# Patient Record
Sex: FEMALE | Race: WHITE | NOT HISPANIC OR LATINO | Employment: FULL TIME | URBAN - METROPOLITAN AREA
[De-identification: names, ages, dates, MRNs, and addresses within clinical notes are randomized per-mention and may not be internally consistent; named-entity substitution may affect disease eponyms.]

---

## 2017-02-03 ENCOUNTER — GENERIC CONVERSION - ENCOUNTER (OUTPATIENT)
Dept: OTHER | Facility: OTHER | Age: 22
End: 2017-02-03

## 2017-07-07 ENCOUNTER — GENERIC CONVERSION - ENCOUNTER (OUTPATIENT)
Dept: OTHER | Facility: OTHER | Age: 22
End: 2017-07-07

## 2018-04-10 ENCOUNTER — TRANSITIONAL CARE MANAGEMENT (OUTPATIENT)
Dept: FAMILY MEDICINE CLINIC | Facility: CLINIC | Age: 23
End: 2018-04-10

## 2018-04-10 ENCOUNTER — TELEPHONE (OUTPATIENT)
Dept: FAMILY MEDICINE CLINIC | Facility: CLINIC | Age: 23
End: 2018-04-10

## 2018-04-10 RX ORDER — PROCHLORPERAZINE MALEATE 10 MG
10 TABLET ORAL EVERY 6 HOURS PRN
COMMUNITY
End: 2019-05-03 | Stop reason: ALTCHOICE

## 2018-04-10 RX ORDER — MIRTAZAPINE 15 MG/1
15 TABLET, FILM COATED ORAL
COMMUNITY
End: 2018-04-23 | Stop reason: SDUPTHER

## 2018-04-10 NOTE — TELEPHONE ENCOUNTER
Called patient to schedule TCM  Patient called and left a message for me to call her back on Monday  Per Patient she was admitted at [facility]   Please direct the call to me when she calls back

## 2018-04-13 ENCOUNTER — OFFICE VISIT (OUTPATIENT)
Dept: FAMILY MEDICINE CLINIC | Facility: CLINIC | Age: 23
End: 2018-04-13
Payer: COMMERCIAL

## 2018-04-13 VITALS
TEMPERATURE: 98.1 F | SYSTOLIC BLOOD PRESSURE: 98 MMHG | BODY MASS INDEX: 20.38 KG/M2 | RESPIRATION RATE: 16 BRPM | DIASTOLIC BLOOD PRESSURE: 68 MMHG | HEART RATE: 64 BPM | WEIGHT: 119.38 LBS | HEIGHT: 64 IN

## 2018-04-13 DIAGNOSIS — Z30.011 ENCOUNTER FOR INITIAL PRESCRIPTION OF CONTRACEPTIVE PILLS: ICD-10-CM

## 2018-04-13 DIAGNOSIS — G43.A0 CYCLICAL VOMITING WITH NAUSEA, INTRACTABILITY OF VOMITING NOT SPECIFIED: Primary | ICD-10-CM

## 2018-04-13 DIAGNOSIS — G43.A0 CYCLICAL VOMITING WITH NAUSEA, INTRACTABILITY OF VOMITING NOT SPECIFIED: ICD-10-CM

## 2018-04-13 DIAGNOSIS — K82.4 GALLBLADDER POLYP: ICD-10-CM

## 2018-04-13 PROCEDURE — 99495 TRANSJ CARE MGMT MOD F2F 14D: CPT | Performed by: FAMILY MEDICINE

## 2018-04-13 RX ORDER — ACETAMINOPHEN AND CODEINE PHOSPHATE 120; 12 MG/5ML; MG/5ML
1 SOLUTION ORAL DAILY
Qty: 28 TABLET | Refills: 0 | Status: SHIPPED | OUTPATIENT
Start: 2018-04-13 | End: 2018-05-04 | Stop reason: SDUPTHER

## 2018-04-13 RX ORDER — ACETAMINOPHEN AND CODEINE PHOSPHATE 120; 12 MG/5ML; MG/5ML
1 SOLUTION ORAL DAILY
Qty: 28 TABLET | Refills: 5 | Status: SHIPPED | OUTPATIENT
Start: 2018-04-13 | End: 2018-04-13 | Stop reason: SDUPTHER

## 2018-04-13 NOTE — LETTER
April 13, 2018     Patient: Juliette Foley   YOB: 1995   Date of Visit: 4/13/2018       To Whom it May Concern:    Juliette Foley is under my professional care  She was seen in my office on 4/13/2018  She may return to work on 4/15/18  If you have any questions or concerns, please don't hesitate to call           Sincerely,          Isatu De Anda MD        CC: No Recipients

## 2018-04-13 NOTE — PROGRESS NOTES
Assessment/Plan:    No problem-specific Assessment & Plan notes found for this encounter  Diagnoses and all orders for this visit:    Cyclical vomiting with nausea, intractability of vomiting not specified  -     Ambulatory referral to Gastroenterology; Future    Gallbladder polyp  -     Ambulatory referral to Gastroenterology; Future          Subjective:      Patient ID: Keri Pichardo is a 23 y o  female  Vomiting    This is a chronic problem  The current episode started more than 1 year ago  The problem occurs 2 to 4 times per day (depends on what she eats)  The problem has been waxing and waning  The emesis has an appearance of stomach contents  There has been no fever  The fever has been present for less than 1 day  Associated symptoms include weight loss  Pertinent negatives include no abdominal pain, chest pain, coughing or headaches  Treatments tried: domperidone and compazine-domperidone caused tachycardia  The treatment provided mild relief  The following portions of the patient's history were reviewed and updated as appropriate: allergies, current medications, past family history, past medical history, past social history, past surgical history and problem list     Review of Systems   Constitutional: Positive for weight loss  Negative for unexpected weight change  HENT: Negative for sore throat  Respiratory: Negative for cough  Cardiovascular: Negative for chest pain  Gastrointestinal: Positive for nausea and vomiting  Negative for abdominal pain  Neurological: Negative for headaches  Objective:      BP 98/68 (BP Location: Right arm, Patient Position: Sitting, Cuff Size: Standard)   Pulse 64   Temp 98 1 °F (36 7 °C) (Temporal)   Resp 16   Ht 5' 4 25" (1 632 m)   Wt 54 1 kg (119 lb 6 oz)   LMP 03/22/2018 (Approximate)   BMI 20 33 kg/m²          Physical Exam   Constitutional: She appears well-developed and well-nourished  Neck: No thyromegaly present  Cardiovascular: Normal rate, regular rhythm and normal heart sounds  Pulmonary/Chest: Effort normal and breath sounds normal    Abdominal: Soft  Bowel sounds are normal  She exhibits no distension  Lymphadenopathy:     She has no cervical adenopathy

## 2018-04-13 NOTE — PATIENT INSTRUCTIONS
Look into [facility] paperwork for job, get opinion from new GI doc,micronor does not interact with cyclic vomiting syndrome-sx worse with menses-instructed to take as extended cycle pill

## 2018-04-23 ENCOUNTER — TELEPHONE (OUTPATIENT)
Dept: FAMILY MEDICINE CLINIC | Facility: CLINIC | Age: 23
End: 2018-04-23

## 2018-04-23 DIAGNOSIS — F41.9 ANXIETY: Primary | ICD-10-CM

## 2018-04-23 RX ORDER — MIRTAZAPINE 15 MG/1
15 TABLET, FILM COATED ORAL
Qty: 30 TABLET | Refills: 2 | Status: SHIPPED | OUTPATIENT
Start: 2018-04-23 | End: 2018-07-17 | Stop reason: SDUPTHER

## 2018-04-23 RX ORDER — ALPRAZOLAM 0.25 MG/1
0.25 TABLET ORAL 2 TIMES DAILY PRN
Qty: 30 TABLET | Refills: 1 | Status: SHIPPED | OUTPATIENT
Start: 2018-04-23 | End: 2018-05-30 | Stop reason: SDUPTHER

## 2018-04-23 NOTE — TELEPHONE ENCOUNTER
Patient called in and asked if you can give her a call back to discuss mirtazapine  15 mg tablet rx please advise

## 2018-05-04 DIAGNOSIS — G43.A0 CYCLICAL VOMITING WITH NAUSEA, INTRACTABILITY OF VOMITING NOT SPECIFIED: ICD-10-CM

## 2018-05-07 RX ORDER — ACETAMINOPHEN AND CODEINE PHOSPHATE 120; 12 MG/5ML; MG/5ML
1 SOLUTION ORAL DAILY
Qty: 28 TABLET | Refills: 4 | Status: SHIPPED | OUTPATIENT
Start: 2018-05-07 | End: 2018-09-26 | Stop reason: SDUPTHER

## 2018-05-30 DIAGNOSIS — F41.9 ANXIETY: ICD-10-CM

## 2018-05-31 RX ORDER — ALPRAZOLAM 0.25 MG/1
TABLET ORAL
Qty: 60 TABLET | Refills: 1 | Status: SHIPPED | OUTPATIENT
Start: 2018-05-31 | End: 2018-07-27 | Stop reason: SDUPTHER

## 2018-06-26 RX ORDER — OMEPRAZOLE 20 MG/1
20 CAPSULE, DELAYED RELEASE ORAL DAILY
Refills: 0 | COMMUNITY
Start: 2018-05-23 | End: 2019-05-03 | Stop reason: ALTCHOICE

## 2018-06-26 RX ORDER — OXYCODONE HYDROCHLORIDE AND ACETAMINOPHEN 5; 325 MG/1; MG/1
1 TABLET ORAL EVERY 6 HOURS PRN
Refills: 0 | COMMUNITY
Start: 2018-06-21 | End: 2019-05-03 | Stop reason: ALTCHOICE

## 2018-06-26 RX ORDER — SUCRALFATE 1 G/1
TABLET ORAL
Refills: 2 | COMMUNITY
Start: 2018-05-01 | End: 2019-05-03 | Stop reason: ALTCHOICE

## 2018-07-03 ENCOUNTER — OFFICE VISIT (OUTPATIENT)
Dept: FAMILY MEDICINE CLINIC | Facility: CLINIC | Age: 23
End: 2018-07-03
Payer: COMMERCIAL

## 2018-07-03 VITALS
DIASTOLIC BLOOD PRESSURE: 70 MMHG | TEMPERATURE: 98.8 F | RESPIRATION RATE: 16 BRPM | SYSTOLIC BLOOD PRESSURE: 104 MMHG | HEART RATE: 100 BPM | WEIGHT: 113.5 LBS | HEIGHT: 64 IN | BODY MASS INDEX: 19.38 KG/M2

## 2018-07-03 DIAGNOSIS — G43.A0 CYCLICAL VOMITING WITH NAUSEA, INTRACTABILITY OF VOMITING NOT SPECIFIED: ICD-10-CM

## 2018-07-03 DIAGNOSIS — J18.9 WALKING PNEUMONIA: Primary | ICD-10-CM

## 2018-07-03 PROCEDURE — 99214 OFFICE O/P EST MOD 30 MIN: CPT | Performed by: NURSE PRACTITIONER

## 2018-07-03 PROCEDURE — 3008F BODY MASS INDEX DOCD: CPT | Performed by: NURSE PRACTITIONER

## 2018-07-03 PROCEDURE — 1036F TOBACCO NON-USER: CPT | Performed by: NURSE PRACTITIONER

## 2018-07-03 RX ORDER — LEVOFLOXACIN 500 MG/1
500 TABLET, FILM COATED ORAL EVERY 24 HOURS
Qty: 7 TABLET | Refills: 0 | Status: SHIPPED | OUTPATIENT
Start: 2018-07-03 | End: 2018-07-10

## 2018-07-03 NOTE — PROGRESS NOTES
Chief Complaint   Patient presents with    Follow-up     FMLA paper work for work clearance  Pt also states having a cough with phlegm,       Assessment/Plan:       Diagnoses and all orders for this visit:    Walking pneumonia  -     levofloxacin (LEVAQUIN) 500 mg tablet; Take 1 tablet (500 mg total) by mouth every 24 hours for 7 days    Cyclical vomiting with nausea, intractability of vomiting not specified  Comments:  better since surgery      patient FMLA paperwork completed and return to work completed  Following her gallbladder surgery  Patient has follow up on Thursday with surgeon  Wants to go back to work  Subjective:      Patient ID: Davidson Acuna is a 20 y o  female  Cough   This is a new problem  The current episode started 1 to 4 weeks ago  The problem has been unchanged  The problem occurs constantly  The cough is productive of sputum  Associated symptoms include chills, a fever (one day), headaches, postnasal drip and wheezing  Pertinent negatives include no ear pain or sore throat  The following portions of the patient's history were reviewed and updated as appropriate: allergies, current medications, past family history, past medical history, past social history, past surgical history and problem list     Review of Systems   Constitutional: Positive for appetite change, chills and fever (one day)  HENT: Positive for postnasal drip  Negative for congestion, ear pain and sore throat  Respiratory: Positive for cough and wheezing  Gastrointestinal: Positive for nausea and vomiting (in the morning )  Negative for abdominal pain  Neurological: Positive for headaches           Objective:      /70 (BP Location: Right arm, Patient Position: Sitting, Cuff Size: Adult)   Pulse 100   Temp 98 8 °F (37 1 °C) (Temporal)   Resp 16   Ht 5' 4 06" (1 627 m)   Wt 51 5 kg (113 lb 8 oz)   LMP 06/04/2018 (Approximate)   BMI 19 44 kg/m²          Physical Exam   Constitutional: She appears well-developed and well-nourished  She has a sickly appearance  HENT:   Head: Normocephalic and atraumatic  Right Ear: Tympanic membrane normal    Left Ear: Tympanic membrane normal    Nose: Nose normal    Mouth/Throat: No posterior oropharyngeal erythema  Cardiovascular: S1 normal, S2 normal and normal heart sounds  Tachycardia present  No murmur heard  Pulmonary/Chest: Effort normal  She has no wheezes  She has rhonchi in the right upper field, the right middle field, the left upper field and the left middle field  Lymphadenopathy:     She has no cervical adenopathy

## 2018-07-11 ENCOUNTER — TELEPHONE (OUTPATIENT)
Dept: FAMILY MEDICINE CLINIC | Facility: CLINIC | Age: 23
End: 2018-07-11

## 2018-07-12 ENCOUNTER — TELEPHONE (OUTPATIENT)
Dept: FAMILY MEDICINE CLINIC | Facility: CLINIC | Age: 23
End: 2018-07-12

## 2018-07-17 DIAGNOSIS — F41.9 ANXIETY: ICD-10-CM

## 2018-07-17 RX ORDER — MIRTAZAPINE 15 MG/1
TABLET, FILM COATED ORAL
Qty: 30 TABLET | Refills: 2 | Status: SHIPPED | OUTPATIENT
Start: 2018-07-17 | End: 2018-11-12 | Stop reason: SDUPTHER

## 2018-07-27 DIAGNOSIS — F41.9 ANXIETY: ICD-10-CM

## 2018-07-27 RX ORDER — ALPRAZOLAM 0.25 MG/1
TABLET ORAL
Qty: 60 TABLET | Refills: 1 | Status: SHIPPED | OUTPATIENT
Start: 2018-07-27 | End: 2018-10-09 | Stop reason: SDUPTHER

## 2018-07-30 ENCOUNTER — TELEPHONE (OUTPATIENT)
Dept: FAMILY MEDICINE CLINIC | Facility: CLINIC | Age: 23
End: 2018-07-30

## 2018-07-30 NOTE — TELEPHONE ENCOUNTER
Pt called in and asked if you can up her dose to 1mg on   ALPRAZolam  0 25 mg tablet since her current dose is not working, if so pt will need a new rx [facility]  please advise

## 2018-09-26 DIAGNOSIS — G43.A0 CYCLICAL VOMITING WITH NAUSEA, INTRACTABILITY OF VOMITING NOT SPECIFIED: ICD-10-CM

## 2018-09-26 RX ORDER — ACETAMINOPHEN AND CODEINE PHOSPHATE 120; 12 MG/5ML; MG/5ML
1 SOLUTION ORAL DAILY
Qty: 28 TABLET | Refills: 4 | Status: SHIPPED | OUTPATIENT
Start: 2018-09-26 | End: 2019-01-21 | Stop reason: SDUPTHER

## 2018-10-09 DIAGNOSIS — F41.9 ANXIETY: ICD-10-CM

## 2018-10-10 RX ORDER — ALPRAZOLAM 0.25 MG/1
TABLET ORAL
Qty: 60 TABLET | Refills: 2 | Status: SHIPPED | OUTPATIENT
Start: 2018-10-10 | End: 2019-03-07 | Stop reason: SDUPTHER

## 2018-11-06 DIAGNOSIS — F41.9 ANXIETY: ICD-10-CM

## 2018-11-06 RX ORDER — MIRTAZAPINE 15 MG/1
TABLET, FILM COATED ORAL
Qty: 30 TABLET | Refills: 2 | OUTPATIENT
Start: 2018-11-06

## 2018-11-12 DIAGNOSIS — F41.9 ANXIETY: ICD-10-CM

## 2018-11-12 RX ORDER — MIRTAZAPINE 15 MG/1
15 TABLET, FILM COATED ORAL
Qty: 30 TABLET | Refills: 0 | Status: SHIPPED | OUTPATIENT
Start: 2018-11-12 | End: 2018-12-08 | Stop reason: SDUPTHER

## 2018-11-12 NOTE — TELEPHONE ENCOUNTER
Patient called in for refill on  mirtazapine (REMERON) 15 mg tablet  Sent to [facility] on file   Patient has an upcoming appointment on 11/20/18 she is completely out of her medication for yesterday and today and will like to know If you can send over a week supply to hold her over please advise

## 2018-12-08 DIAGNOSIS — F41.9 ANXIETY: ICD-10-CM

## 2018-12-09 RX ORDER — MIRTAZAPINE 15 MG/1
15 TABLET, FILM COATED ORAL
Qty: 30 TABLET | Refills: 0 | Status: SHIPPED | OUTPATIENT
Start: 2018-12-09 | End: 2019-01-06 | Stop reason: SDUPTHER

## 2019-01-06 DIAGNOSIS — F41.9 ANXIETY: ICD-10-CM

## 2019-01-06 RX ORDER — MIRTAZAPINE 15 MG/1
15 TABLET, FILM COATED ORAL
Qty: 30 TABLET | Refills: 0 | Status: SHIPPED | OUTPATIENT
Start: 2019-01-06 | End: 2019-02-17 | Stop reason: SDUPTHER

## 2019-01-21 DIAGNOSIS — G43.A0 CYCLICAL VOMITING WITH NAUSEA, INTRACTABILITY OF VOMITING NOT SPECIFIED: ICD-10-CM

## 2019-01-21 RX ORDER — ACETAMINOPHEN AND CODEINE PHOSPHATE 120; 12 MG/5ML; MG/5ML
1 SOLUTION ORAL DAILY
Qty: 28 TABLET | Refills: 4 | Status: SHIPPED | OUTPATIENT
Start: 2019-01-21 | End: 2019-04-18 | Stop reason: SDUPTHER

## 2019-02-17 DIAGNOSIS — F41.9 ANXIETY: ICD-10-CM

## 2019-02-17 RX ORDER — MIRTAZAPINE 15 MG/1
15 TABLET, FILM COATED ORAL
Qty: 30 TABLET | Refills: 0 | Status: SHIPPED | OUTPATIENT
Start: 2019-02-17 | End: 2019-03-21 | Stop reason: SDUPTHER

## 2019-03-07 DIAGNOSIS — F41.9 ANXIETY: ICD-10-CM

## 2019-03-08 RX ORDER — ALPRAZOLAM 0.25 MG/1
TABLET ORAL
Qty: 60 TABLET | Refills: 2 | Status: SHIPPED | OUTPATIENT
Start: 2019-03-08 | End: 2019-07-02 | Stop reason: SDUPTHER

## 2019-03-21 DIAGNOSIS — F41.9 ANXIETY: ICD-10-CM

## 2019-03-21 RX ORDER — MIRTAZAPINE 15 MG/1
15 TABLET, FILM COATED ORAL
Qty: 30 TABLET | Refills: 0 | Status: SHIPPED | OUTPATIENT
Start: 2019-03-21 | End: 2019-04-25 | Stop reason: SDUPTHER

## 2019-04-18 DIAGNOSIS — G43.A0 CYCLICAL VOMITING WITH NAUSEA, INTRACTABILITY OF VOMITING NOT SPECIFIED: ICD-10-CM

## 2019-04-18 RX ORDER — ACETAMINOPHEN AND CODEINE PHOSPHATE 120; 12 MG/5ML; MG/5ML
1 SOLUTION ORAL DAILY
Qty: 28 TABLET | Refills: 4 | Status: SHIPPED | OUTPATIENT
Start: 2019-04-18 | End: 2019-07-01 | Stop reason: SDUPTHER

## 2019-04-21 DIAGNOSIS — F41.9 ANXIETY: ICD-10-CM

## 2019-04-21 RX ORDER — MIRTAZAPINE 15 MG/1
15 TABLET, FILM COATED ORAL
Qty: 30 TABLET | Refills: 0 | OUTPATIENT
Start: 2019-04-21

## 2019-04-25 ENCOUNTER — TELEPHONE (OUTPATIENT)
Dept: FAMILY MEDICINE CLINIC | Facility: CLINIC | Age: 24
End: 2019-04-25

## 2019-04-25 DIAGNOSIS — F41.9 ANXIETY: ICD-10-CM

## 2019-04-26 RX ORDER — MIRTAZAPINE 15 MG/1
15 TABLET, FILM COATED ORAL
Qty: 30 TABLET | Refills: 0 | Status: SHIPPED | OUTPATIENT
Start: 2019-04-26 | End: 2019-05-03 | Stop reason: SDUPTHER

## 2019-05-03 ENCOUNTER — OFFICE VISIT (OUTPATIENT)
Dept: FAMILY MEDICINE CLINIC | Facility: CLINIC | Age: 24
End: 2019-05-03
Payer: COMMERCIAL

## 2019-05-03 VITALS
TEMPERATURE: 97.6 F | DIASTOLIC BLOOD PRESSURE: 68 MMHG | HEIGHT: 64 IN | BODY MASS INDEX: 21.24 KG/M2 | HEART RATE: 98 BPM | RESPIRATION RATE: 18 BRPM | SYSTOLIC BLOOD PRESSURE: 104 MMHG | WEIGHT: 124.4 LBS

## 2019-05-03 DIAGNOSIS — G43.A0 CYCLICAL VOMITING WITH NAUSEA, INTRACTABILITY OF VOMITING NOT SPECIFIED: Primary | ICD-10-CM

## 2019-05-03 DIAGNOSIS — F41.9 ANXIETY: ICD-10-CM

## 2019-05-03 PROCEDURE — 1036F TOBACCO NON-USER: CPT | Performed by: FAMILY MEDICINE

## 2019-05-03 PROCEDURE — 99213 OFFICE O/P EST LOW 20 MIN: CPT | Performed by: FAMILY MEDICINE

## 2019-05-03 PROCEDURE — 3008F BODY MASS INDEX DOCD: CPT | Performed by: FAMILY MEDICINE

## 2019-05-03 RX ORDER — MIRTAZAPINE 15 MG/1
TABLET, FILM COATED ORAL
Qty: 45 TABLET | Refills: 3 | Status: SHIPPED | OUTPATIENT
Start: 2019-05-03 | End: 2019-07-31 | Stop reason: SDUPTHER

## 2019-05-23 DIAGNOSIS — F41.9 ANXIETY: ICD-10-CM

## 2019-05-23 RX ORDER — MIRTAZAPINE 15 MG/1
15 TABLET, FILM COATED ORAL
Qty: 30 TABLET | Refills: 0 | Status: SHIPPED | OUTPATIENT
Start: 2019-05-23 | End: 2019-05-24 | Stop reason: SDUPTHER

## 2019-05-24 ENCOUNTER — TELEPHONE (OUTPATIENT)
Dept: FAMILY MEDICINE CLINIC | Facility: CLINIC | Age: 24
End: 2019-05-24

## 2019-05-24 DIAGNOSIS — F41.9 ANXIETY: ICD-10-CM

## 2019-05-24 RX ORDER — MIRTAZAPINE 15 MG/1
15 TABLET, FILM COATED ORAL
Qty: 90 TABLET | Refills: 0 | Status: SHIPPED | OUTPATIENT
Start: 2019-05-24 | End: 2019-07-31 | Stop reason: SDUPTHER

## 2019-07-01 DIAGNOSIS — G43.A0 CYCLICAL VOMITING WITH NAUSEA, INTRACTABILITY OF VOMITING NOT SPECIFIED: ICD-10-CM

## 2019-07-02 DIAGNOSIS — F41.9 ANXIETY: ICD-10-CM

## 2019-07-02 RX ORDER — ACETAMINOPHEN AND CODEINE PHOSPHATE 120; 12 MG/5ML; MG/5ML
SOLUTION ORAL
Qty: 84 TABLET | Refills: 1 | Status: SHIPPED | OUTPATIENT
Start: 2019-07-02 | End: 2020-02-05 | Stop reason: SDUPTHER

## 2019-07-02 RX ORDER — ALPRAZOLAM 0.25 MG/1
TABLET ORAL
Qty: 180 TABLET | Refills: 0 | Status: SHIPPED | OUTPATIENT
Start: 2019-07-02 | End: 2020-04-16 | Stop reason: SDUPTHER

## 2019-07-31 DIAGNOSIS — F41.9 ANXIETY: ICD-10-CM

## 2019-07-31 RX ORDER — MIRTAZAPINE 15 MG/1
15 TABLET, FILM COATED ORAL
Qty: 90 TABLET | Refills: 1 | Status: SHIPPED | OUTPATIENT
Start: 2019-07-31 | End: 2020-02-06

## 2019-07-31 RX ORDER — MIRTAZAPINE 15 MG/1
TABLET, FILM COATED ORAL
Qty: 45 TABLET | Refills: 3 | Status: SHIPPED | OUTPATIENT
Start: 2019-07-31 | End: 2019-10-14 | Stop reason: SDUPTHER

## 2019-10-14 DIAGNOSIS — F41.9 ANXIETY: ICD-10-CM

## 2019-10-16 RX ORDER — MIRTAZAPINE 15 MG/1
TABLET, FILM COATED ORAL
Qty: 45 TABLET | Refills: 3 | Status: SHIPPED | OUTPATIENT
Start: 2019-10-16 | End: 2020-04-16 | Stop reason: SDUPTHER

## 2020-02-05 ENCOUNTER — TELEPHONE (OUTPATIENT)
Dept: FAMILY MEDICINE CLINIC | Facility: CLINIC | Age: 25
End: 2020-02-05

## 2020-02-05 DIAGNOSIS — R11.15 CYCLICAL VOMITING WITH NAUSEA: ICD-10-CM

## 2020-02-05 RX ORDER — ACETAMINOPHEN AND CODEINE PHOSPHATE 120; 12 MG/5ML; MG/5ML
1 SOLUTION ORAL DAILY
Qty: 84 TABLET | Refills: 0 | Status: SHIPPED | OUTPATIENT
Start: 2020-02-05 | End: 2020-04-16 | Stop reason: SDUPTHER

## 2020-02-05 NOTE — TELEPHONE ENCOUNTER
[facility] TO REFILL PT'S BIRTH CONTROL  SHE HAS NOT TAKEN IT FOR 2 DAYS NOW  [facility] [place]  PLEASE CHANGE THIS PHARMACY IN HER CHART BECAUSE SHE WANTS EVERYTHING TO GO THERE NOW  PLEASE LET HER KNOW ASAP AS SHE HAS NOT TAKEN HER BIRTH CONTROL FOR 2 DAYS  THANK YOU  PT WOULD LIKE A CALL BACK ASAP REGARDING THIS PLEASE  THANK YOU

## 2020-02-06 PROBLEM — K82.4 GALLBLADDER POLYP: Status: RESOLVED | Noted: 2018-04-13 | Resolved: 2020-02-06

## 2020-02-06 PROBLEM — Z78.9 USES BIRTH CONTROL: Status: ACTIVE | Noted: 2020-02-06

## 2020-02-06 PROBLEM — F41.9 ANXIETY: Status: ACTIVE | Noted: 2020-02-06

## 2020-04-16 ENCOUNTER — TELEMEDICINE (OUTPATIENT)
Dept: FAMILY MEDICINE CLINIC | Facility: CLINIC | Age: 25
End: 2020-04-16
Payer: COMMERCIAL

## 2020-04-16 DIAGNOSIS — F41.9 ANXIETY: ICD-10-CM

## 2020-04-16 DIAGNOSIS — R11.15 CYCLICAL VOMITING WITH NAUSEA: ICD-10-CM

## 2020-04-16 PROCEDURE — 99213 OFFICE O/P EST LOW 20 MIN: CPT | Performed by: FAMILY MEDICINE

## 2020-04-16 RX ORDER — ACETAMINOPHEN AND CODEINE PHOSPHATE 120; 12 MG/5ML; MG/5ML
1 SOLUTION ORAL DAILY
Qty: 84 TABLET | Refills: 1 | Status: SHIPPED | OUTPATIENT
Start: 2020-04-16 | End: 2020-09-15

## 2020-04-16 RX ORDER — MIRTAZAPINE 15 MG/1
TABLET, FILM COATED ORAL
Qty: 135 TABLET | Refills: 1 | Status: SHIPPED | OUTPATIENT
Start: 2020-04-16 | End: 2020-10-16 | Stop reason: SDUPTHER

## 2020-04-16 RX ORDER — ALPRAZOLAM 0.25 MG/1
0.25 TABLET ORAL 2 TIMES DAILY
Qty: 180 TABLET | Refills: 1 | Status: SHIPPED | OUTPATIENT
Start: 2020-04-16 | End: 2020-10-16 | Stop reason: SDUPTHER

## 2020-09-15 DIAGNOSIS — R11.15 CYCLICAL VOMITING WITH NAUSEA: ICD-10-CM

## 2020-09-15 RX ORDER — ACETAMINOPHEN AND CODEINE PHOSPHATE 120; 12 MG/5ML; MG/5ML
SOLUTION ORAL
Qty: 84 TABLET | Refills: 0 | Status: SHIPPED | OUTPATIENT
Start: 2020-09-15 | End: 2020-10-16 | Stop reason: SDUPTHER

## 2020-10-16 ENCOUNTER — TELEMEDICINE (OUTPATIENT)
Dept: FAMILY MEDICINE CLINIC | Facility: CLINIC | Age: 25
End: 2020-10-16
Payer: COMMERCIAL

## 2020-10-16 ENCOUNTER — TELEPHONE (OUTPATIENT)
Dept: FAMILY MEDICINE CLINIC | Facility: CLINIC | Age: 25
End: 2020-10-16

## 2020-10-16 VITALS — BODY MASS INDEX: 24.89 KG/M2 | WEIGHT: 145 LBS

## 2020-10-16 DIAGNOSIS — F41.9 ANXIETY: ICD-10-CM

## 2020-10-16 DIAGNOSIS — R11.15 CYCLICAL VOMITING WITH NAUSEA: ICD-10-CM

## 2020-10-16 PROCEDURE — 1036F TOBACCO NON-USER: CPT | Performed by: FAMILY MEDICINE

## 2020-10-16 PROCEDURE — 3725F SCREEN DEPRESSION PERFORMED: CPT | Performed by: FAMILY MEDICINE

## 2020-10-16 PROCEDURE — 99213 OFFICE O/P EST LOW 20 MIN: CPT | Performed by: FAMILY MEDICINE

## 2020-10-16 RX ORDER — ALPRAZOLAM 0.25 MG/1
0.25 TABLET ORAL 2 TIMES DAILY
Qty: 180 TABLET | Refills: 0 | Status: SHIPPED | OUTPATIENT
Start: 2020-10-16 | End: 2021-03-03 | Stop reason: SDUPTHER

## 2020-10-16 RX ORDER — ALPRAZOLAM 0.25 MG/1
0.25 TABLET ORAL 2 TIMES DAILY
Qty: 180 TABLET | Refills: 1 | Status: CANCELLED | OUTPATIENT
Start: 2020-10-16

## 2020-10-16 RX ORDER — MIRTAZAPINE 15 MG/1
TABLET, FILM COATED ORAL
Qty: 135 TABLET | Refills: 1 | Status: SHIPPED | OUTPATIENT
Start: 2020-10-16 | End: 2021-05-19

## 2020-10-16 RX ORDER — ACETAMINOPHEN AND CODEINE PHOSPHATE 120; 12 MG/5ML; MG/5ML
1 SOLUTION ORAL DAILY
Qty: 84 TABLET | Refills: 0 | Status: SHIPPED | OUTPATIENT
Start: 2020-10-16 | End: 2021-02-07

## 2021-02-07 DIAGNOSIS — R11.15 CYCLICAL VOMITING WITH NAUSEA: ICD-10-CM

## 2021-02-07 RX ORDER — ACETAMINOPHEN AND CODEINE PHOSPHATE 120; 12 MG/5ML; MG/5ML
SOLUTION ORAL
Qty: 84 TABLET | Refills: 1 | Status: SHIPPED | OUTPATIENT
Start: 2021-02-07 | End: 2021-05-03 | Stop reason: SDUPTHER

## 2021-02-28 ENCOUNTER — HOSPITAL ENCOUNTER (OUTPATIENT)
Facility: HOSPITAL | Age: 26
Setting detail: OBSERVATION
Discharge: HOME | End: 2021-03-02
Attending: EMERGENCY MEDICINE | Admitting: INTERNAL MEDICINE
Payer: COMMERCIAL

## 2021-02-28 DIAGNOSIS — R11.15 INTRACTABLE CYCLICAL VOMITING WITH NAUSEA: ICD-10-CM

## 2021-02-28 DIAGNOSIS — R11.10 INTRACTABLE VOMITING: Primary | ICD-10-CM

## 2021-02-28 DIAGNOSIS — E80.6 HYPERBILIRUBINEMIA: ICD-10-CM

## 2021-02-28 DIAGNOSIS — D72.829 LEUKOCYTOSIS: ICD-10-CM

## 2021-02-28 PROBLEM — R19.7 DIARRHEA: Status: ACTIVE | Noted: 2021-02-28

## 2021-02-28 PROBLEM — F12.90 MARIJUANA USE: Status: ACTIVE | Noted: 2021-02-28

## 2021-02-28 LAB
ALBUMIN SERPL BCP-MCNC: 5.1 G/DL (ref 3.5–5)
ALP SERPL-CCNC: 84 U/L (ref 46–116)
ALT SERPL W P-5'-P-CCNC: 40 U/L (ref 12–78)
ANION GAP SERPL CALCULATED.3IONS-SCNC: 8 MMOL/L (ref 4–13)
AST SERPL W P-5'-P-CCNC: 19 U/L (ref 5–45)
ATRIAL RATE: 75 BPM
BACTERIA UR QL AUTO: ABNORMAL /HPF
BASOPHILS # BLD AUTO: 0.08 THOUSANDS/ΜL (ref 0–0.1)
BASOPHILS NFR BLD AUTO: 0 % (ref 0–1)
BILIRUB SERPL-MCNC: 2.07 MG/DL (ref 0.2–1)
BILIRUB UR QL STRIP: NEGATIVE
BUN SERPL-MCNC: 13 MG/DL (ref 5–25)
CALCIUM SERPL-MCNC: 10.3 MG/DL (ref 8.3–10.1)
CHLORIDE SERPL-SCNC: 108 MMOL/L (ref 100–108)
CLARITY UR: CLEAR
CO2 SERPL-SCNC: 25 MMOL/L (ref 21–32)
COLOR UR: YELLOW
CREAT SERPL-MCNC: 0.99 MG/DL (ref 0.6–1.3)
EOSINOPHIL # BLD AUTO: 0.01 THOUSAND/ΜL (ref 0–0.61)
EOSINOPHIL NFR BLD AUTO: 0 % (ref 0–6)
ERYTHROCYTE [DISTWIDTH] IN BLOOD BY AUTOMATED COUNT: 12.6 % (ref 11.6–15.1)
EXT PREG TEST URINE: NEGATIVE
EXT. CONTROL ED NAV: NORMAL
GFR SERPL CREATININE-BSD FRML MDRD: 78 ML/MIN/1.73SQ M
GLUCOSE SERPL-MCNC: 170 MG/DL (ref 65–140)
GLUCOSE UR STRIP-MCNC: NEGATIVE MG/DL
HCT VFR BLD AUTO: 42.8 % (ref 34.8–46.1)
HGB BLD-MCNC: 14.7 G/DL (ref 11.5–15.4)
HGB UR QL STRIP.AUTO: ABNORMAL
HYALINE CASTS #/AREA URNS LPF: ABNORMAL /LPF
IMM GRANULOCYTES # BLD AUTO: 0.17 THOUSAND/UL (ref 0–0.2)
IMM GRANULOCYTES NFR BLD AUTO: 1 % (ref 0–2)
KETONES UR STRIP-MCNC: ABNORMAL MG/DL
LEUKOCYTE ESTERASE UR QL STRIP: NEGATIVE
LYMPHOCYTES # BLD AUTO: 0.66 THOUSANDS/ΜL (ref 0.6–4.47)
LYMPHOCYTES NFR BLD AUTO: 4 % (ref 14–44)
MCH RBC QN AUTO: 29.6 PG (ref 26.8–34.3)
MCHC RBC AUTO-ENTMCNC: 34.3 G/DL (ref 31.4–37.4)
MCV RBC AUTO: 86 FL (ref 82–98)
MONOCYTES # BLD AUTO: 0.37 THOUSAND/ΜL (ref 0.17–1.22)
MONOCYTES NFR BLD AUTO: 2 % (ref 4–12)
NEUTROPHILS # BLD AUTO: 16.8 THOUSANDS/ΜL (ref 1.85–7.62)
NEUTS SEG NFR BLD AUTO: 93 % (ref 43–75)
NITRITE UR QL STRIP: NEGATIVE
NON-SQ EPI CELLS URNS QL MICRO: ABNORMAL /HPF
NRBC BLD AUTO-RTO: 0 /100 WBCS
P AXIS: 54 DEGREES
PH UR STRIP.AUTO: 6 [PH] (ref 4.5–8)
PLATELET # BLD AUTO: 331 THOUSANDS/UL (ref 149–390)
PMV BLD AUTO: 9.9 FL (ref 8.9–12.7)
POTASSIUM SERPL-SCNC: 3.7 MMOL/L (ref 3.5–5.3)
PR INTERVAL: 104 MS
PROT SERPL-MCNC: 8.5 G/DL (ref 6.4–8.2)
PROT UR STRIP-MCNC: ABNORMAL MG/DL
QRS AXIS: 64 DEGREES
QRSD INTERVAL: 80 MS
QT INTERVAL: 358 MS
QTC INTERVAL: 399 MS
RBC # BLD AUTO: 4.96 MILLION/UL (ref 3.81–5.12)
RBC #/AREA URNS AUTO: ABNORMAL /HPF
SODIUM SERPL-SCNC: 141 MMOL/L (ref 136–145)
SP GR UR STRIP.AUTO: >=1.03 (ref 1–1.03)
T WAVE AXIS: -23 DEGREES
UROBILINOGEN UR QL STRIP.AUTO: 0.2 E.U./DL
VENTRICULAR RATE: 75 BPM
WBC # BLD AUTO: 18.09 THOUSAND/UL (ref 4.31–10.16)
WBC #/AREA URNS AUTO: ABNORMAL /HPF

## 2021-02-28 PROCEDURE — 93010 ELECTROCARDIOGRAM REPORT: CPT | Performed by: INTERNAL MEDICINE

## 2021-02-28 PROCEDURE — 80053 COMPREHEN METABOLIC PANEL: CPT | Performed by: EMERGENCY MEDICINE

## 2021-02-28 PROCEDURE — 36415 COLL VENOUS BLD VENIPUNCTURE: CPT | Performed by: EMERGENCY MEDICINE

## 2021-02-28 PROCEDURE — 81025 URINE PREGNANCY TEST: CPT | Performed by: EMERGENCY MEDICINE

## 2021-02-28 PROCEDURE — 96361 HYDRATE IV INFUSION ADD-ON: CPT

## 2021-02-28 PROCEDURE — 93005 ELECTROCARDIOGRAM TRACING: CPT

## 2021-02-28 PROCEDURE — 99285 EMERGENCY DEPT VISIT HI MDM: CPT

## 2021-02-28 PROCEDURE — 99285 EMERGENCY DEPT VISIT HI MDM: CPT | Performed by: EMERGENCY MEDICINE

## 2021-02-28 PROCEDURE — 99220 PR INITIAL OBSERVATION CARE/DAY 70 MINUTES: CPT | Performed by: INTERNAL MEDICINE

## 2021-02-28 PROCEDURE — 85025 COMPLETE CBC W/AUTO DIFF WBC: CPT | Performed by: EMERGENCY MEDICINE

## 2021-02-28 PROCEDURE — 81001 URINALYSIS AUTO W/SCOPE: CPT

## 2021-02-28 PROCEDURE — 96372 THER/PROPH/DIAG INJ SC/IM: CPT

## 2021-02-28 PROCEDURE — 96374 THER/PROPH/DIAG INJ IV PUSH: CPT

## 2021-02-28 RX ORDER — PROMETHAZINE HYDROCHLORIDE 25 MG/ML
25 INJECTION, SOLUTION INTRAMUSCULAR; INTRAVENOUS EVERY 6 HOURS PRN
Status: DISCONTINUED | OUTPATIENT
Start: 2021-02-28 | End: 2021-02-28

## 2021-02-28 RX ORDER — MIRTAZAPINE 15 MG/1
22.5 TABLET, FILM COATED ORAL
Status: DISCONTINUED | OUTPATIENT
Start: 2021-02-28 | End: 2021-03-02 | Stop reason: HOSPADM

## 2021-02-28 RX ORDER — HALOPERIDOL 5 MG/ML
2 INJECTION INTRAMUSCULAR ONCE
Status: DISCONTINUED | OUTPATIENT
Start: 2021-02-28 | End: 2021-02-28

## 2021-02-28 RX ORDER — PROMETHAZINE HYDROCHLORIDE 25 MG/ML
25 INJECTION, SOLUTION INTRAMUSCULAR; INTRAVENOUS EVERY 6 HOURS PRN
Status: DISCONTINUED | OUTPATIENT
Start: 2021-02-28 | End: 2021-03-02 | Stop reason: HOSPADM

## 2021-02-28 RX ORDER — ONDANSETRON 2 MG/ML
4 INJECTION INTRAMUSCULAR; INTRAVENOUS EVERY 6 HOURS PRN
Status: DISCONTINUED | OUTPATIENT
Start: 2021-02-28 | End: 2021-03-01

## 2021-02-28 RX ORDER — ALPRAZOLAM 0.25 MG/1
0.25 TABLET ORAL 2 TIMES DAILY
Status: DISCONTINUED | OUTPATIENT
Start: 2021-02-28 | End: 2021-03-01

## 2021-02-28 RX ORDER — ACETAMINOPHEN AND CODEINE PHOSPHATE 120; 12 MG/5ML; MG/5ML
1 SOLUTION ORAL DAILY
Status: DISCONTINUED | OUTPATIENT
Start: 2021-02-28 | End: 2021-03-02 | Stop reason: HOSPADM

## 2021-02-28 RX ORDER — ONDANSETRON 2 MG/ML
4 INJECTION INTRAMUSCULAR; INTRAVENOUS ONCE
Status: COMPLETED | OUTPATIENT
Start: 2021-02-28 | End: 2021-02-28

## 2021-02-28 RX ORDER — LORAZEPAM 2 MG/ML
0.5 INJECTION INTRAMUSCULAR ONCE
Status: DISCONTINUED | OUTPATIENT
Start: 2021-02-28 | End: 2021-02-28

## 2021-02-28 RX ORDER — OLANZAPINE 10 MG/1
5 INJECTION, POWDER, LYOPHILIZED, FOR SOLUTION INTRAMUSCULAR ONCE
Status: COMPLETED | OUTPATIENT
Start: 2021-02-28 | End: 2021-02-28

## 2021-02-28 RX ORDER — SODIUM CHLORIDE, SODIUM GLUCONATE, SODIUM ACETATE, POTASSIUM CHLORIDE, MAGNESIUM CHLORIDE, SODIUM PHOSPHATE, DIBASIC, AND POTASSIUM PHOSPHATE .53; .5; .37; .037; .03; .012; .00082 G/100ML; G/100ML; G/100ML; G/100ML; G/100ML; G/100ML; G/100ML
100 INJECTION, SOLUTION INTRAVENOUS CONTINUOUS
Status: DISCONTINUED | OUTPATIENT
Start: 2021-02-28 | End: 2021-03-01

## 2021-02-28 RX ADMIN — OLANZAPINE 5 MG: 10 INJECTION, POWDER, FOR SOLUTION INTRAMUSCULAR at 04:43

## 2021-02-28 RX ADMIN — ALPRAZOLAM 0.25 MG: 0.25 TABLET ORAL at 23:41

## 2021-02-28 RX ADMIN — ALPRAZOLAM 0.25 MG: 0.25 TABLET ORAL at 11:50

## 2021-02-28 RX ADMIN — SODIUM CHLORIDE 1000 ML: 0.9 INJECTION, SOLUTION INTRAVENOUS at 07:15

## 2021-02-28 RX ADMIN — ONDANSETRON 4 MG: 2 INJECTION INTRAMUSCULAR; INTRAVENOUS at 11:50

## 2021-02-28 RX ADMIN — SODIUM CHLORIDE, SODIUM GLUCONATE, SODIUM ACETATE, POTASSIUM CHLORIDE, MAGNESIUM CHLORIDE, SODIUM PHOSPHATE, DIBASIC, AND POTASSIUM PHOSPHATE 150 ML/HR: .53; .5; .37; .037; .03; .012; .00082 INJECTION, SOLUTION INTRAVENOUS at 11:43

## 2021-02-28 RX ADMIN — ONDANSETRON 4 MG: 2 INJECTION INTRAMUSCULAR; INTRAVENOUS at 05:44

## 2021-02-28 RX ADMIN — SODIUM CHLORIDE, SODIUM GLUCONATE, SODIUM ACETATE, POTASSIUM CHLORIDE, MAGNESIUM CHLORIDE, SODIUM PHOSPHATE, DIBASIC, AND POTASSIUM PHOSPHATE 150 ML/HR: .53; .5; .37; .037; .03; .012; .00082 INJECTION, SOLUTION INTRAVENOUS at 18:39

## 2021-02-28 RX ADMIN — ALPRAZOLAM 0.25 MG: 0.25 TABLET ORAL at 18:38

## 2021-02-28 RX ADMIN — PROMETHAZINE HYDROCHLORIDE 25 MG: 25 INJECTION INTRAMUSCULAR; INTRAVENOUS at 16:49

## 2021-02-28 RX ADMIN — SODIUM CHLORIDE 1000 ML: 0.9 INJECTION, SOLUTION INTRAVENOUS at 04:46

## 2021-02-28 RX ADMIN — WATER 10 ML: 1 INJECTION INTRAMUSCULAR; INTRAVENOUS; SUBCUTANEOUS at 04:43

## 2021-02-28 RX ADMIN — MIRTAZAPINE 22.5 MG: 15 TABLET, FILM COATED ORAL at 21:59

## 2021-02-28 RX ADMIN — ONDANSETRON 4 MG: 2 INJECTION INTRAMUSCULAR; INTRAVENOUS at 21:59

## 2021-02-28 NOTE — ASSESSMENT & PLAN NOTE
· Reported history of prolonged QT, current EKG QTC 399ms  · Will monitor with serial EKGs, adjust antiemetic regimen if prolongation should develop

## 2021-02-28 NOTE — ASSESSMENT & PLAN NOTE
· Known history of cyclical vomiting, developed intractable nausea/vomiting this evening  Did not respond to IV fluids, Zofran, Zyprexa in ED  · Admit as observation for further treatment of intractable nausea and vomiting  · Will continue IV fluids for now, advance diet as tolerated  · Alternate between p r n  Zofran and p r n   Phenergan for antiemetic, monitor QTC with serial EKG  · If patient should develop significant abdominal pain will obtain CT abdomen/pelvis

## 2021-02-28 NOTE — ED ATTENDING ATTESTATION
2/28/2021  I, Darling Flynn MD, saw and evaluated the patient  I have discussed the patient with the resident/non-physician practitioner and agree with the resident's/non-physician practitioner's findings, Plan of Care, and MDM as documented in the resident's/non-physician practitioner's note, except where noted  All available labs and Radiology studies were reviewed  I was present for key portions of any procedure(s) performed by the resident/non-physician practitioner and I was immediately available to provide assistance  At this point I agree with the current assessment done in the Emergency Department  I have conducted an independent evaluation of this patient a history and physical is as follows:    OA: 31 y/o f with h/o cyclical vomiting since the age of 7 who presents with vomiting  Pt admits to smoking marijuana but did take a hot shower x 2 without relief of sxms  Unable to tolerate PO at this time secondary to n/v  + dizziness and lightheadedness  Feeling of unwell but denies fevers/chills  No SOB  No urinary sxms  PE, uncomfortable appearing f, VSS, NC/AT, mildly dry and pale conjunctiva, neck supple/FROM, RR, lungs CTAB, abd soft, mild diffuse ttp over abdomen without r/g, - mass, - CVA, - edema, - calf ttp, intact distal pulses and capillary refill < 2 sec, AAO  A/p n/v with history of cyclical vomiting  No relief with at home methods/medications  IVF hydration, EKG, antiemetic if EKG with nl intervals, labs, treat accordingly, consider admission       ED Course     , will require admission    Critical Care Time  Procedures

## 2021-02-28 NOTE — ED PROVIDER NOTES
History  Chief Complaint   Patient presents with    Vomiting     cyclic vomiting syndrome hx  pt reports same today  reports vomiting all day     78-year-old female with history of cyclical vomiting syndrome presents to emergency department for intractable vomiting  Patient says she developed nausea and vomiting this morning and has had >10 episodes of vomiting throughout the day  She smoked marijuana and took a hot bath, which usually help with nausea, but no relief with these therapies  She has also had a couple loose stools  No abdominal pain  Denies fever, cough, chest pain, SOB, n/v/d, abdominal pain, urinary sx, headache, any other complaints  Prior to Admission Medications   Prescriptions Last Dose Informant Patient Reported? Taking? ALPRAZolam (XANAX) 0 25 mg tablet   No No   Sig: Take 1 tablet (0 25 mg total) by mouth 2 (two) times a day   mirtazapine (REMERON) 15 mg tablet   No No   Si  1/2 tabs po qhs   norethindrone (MICRONOR) 0 35 MG tablet   No No   Sig: TAKE 1 TABLET BY MOUTH EVERY DAY      Facility-Administered Medications: None       Past Medical History:   Diagnosis Date    Cyclical vomiting syndrome     Depression     Functional dyspnea        Past Surgical History:   Procedure Laterality Date    CHOLECYSTECTOMY  2018    TONSILLECTOMY         Family History   Problem Relation Age of Onset    Arthritis Mother     No Known Problems Father      I have reviewed and agree with the history as documented  E-Cigarette/Vaping     E-Cigarette/Vaping Substances     Social History     Tobacco Use    Smoking status: Never Smoker    Smokeless tobacco: Never Used   Substance Use Topics    Alcohol use: No    Drug use: Yes     Types: Marijuana        Review of Systems   Constitutional: Negative  Negative for chills and fever  HENT: Negative  Negative for rhinorrhea  Eyes: Negative  Respiratory: Negative  Negative for cough and shortness of breath      Cardiovascular: Negative  Negative for chest pain and leg swelling  Gastrointestinal: Positive for nausea and vomiting  Negative for abdominal pain and diarrhea  Genitourinary: Negative  Negative for dysuria, flank pain and frequency  Musculoskeletal: Negative  Negative for back pain and neck pain  Skin: Negative  Negative for rash  Neurological: Negative  Negative for light-headedness and headaches  All other systems reviewed and are negative  Physical Exam  ED Triage Vitals   Temperature Pulse Respirations Blood Pressure SpO2   02/28/21 0411 02/28/21 0411 02/28/21 0411 02/28/21 0411 02/28/21 0411   98 5 °F (36 9 °C) 60 18 117/80 96 %      Temp Source Heart Rate Source Patient Position - Orthostatic VS BP Location FiO2 (%)   02/28/21 0411 02/28/21 0411 02/28/21 0411 02/28/21 0411 --   Oral Monitor Sitting Right arm       Pain Score       02/28/21 1134       7             Orthostatic Vital Signs  Vitals:    02/28/21 0411 02/28/21 1100   BP: 117/80 119/64   Pulse: 60 77   Patient Position - Orthostatic VS: Sitting Lying       Physical Exam  Vitals signs and nursing note reviewed  Constitutional:       General: She is not in acute distress  Appearance: She is well-developed  Comments: Appears uncomfortable, dry heaving   HENT:      Head: Normocephalic and atraumatic  Mouth/Throat:      Mouth: Mucous membranes are moist    Eyes:      Pupils: Pupils are equal, round, and reactive to light  Neck:      Musculoskeletal: Normal range of motion and neck supple  Cardiovascular:      Rate and Rhythm: Normal rate and regular rhythm  Pulses:           Radial pulses are 2+ on the right side and 2+ on the left side  Heart sounds: Normal heart sounds  No murmur  No friction rub  No gallop  Pulmonary:      Effort: Pulmonary effort is normal  No respiratory distress  Breath sounds: Normal breath sounds  No stridor  No wheezing, rhonchi or rales     Abdominal:      Palpations: Abdomen is soft       Tenderness: There is no abdominal tenderness  There is no guarding or rebound  Musculoskeletal: Normal range of motion  General: No swelling or tenderness  Right lower leg: No edema  Left lower leg: No edema  Skin:     General: Skin is warm and dry  Capillary Refill: Capillary refill takes less than 2 seconds  Neurological:      Mental Status: She is alert and oriented to person, place, and time  Cranial Nerves: No cranial nerve deficit        Comments: Clear fluent speech         ED Medications  Medications   ALPRAZolam (XANAX) tablet 0 25 mg (0 25 mg Oral Given 2/28/21 1150)   mirtazapine (REMERON) tablet 22 5 mg (has no administration in time range)   norethindrone (MICRONOR) tablet 0 35 mg (has no administration in time range)   ondansetron (ZOFRAN) injection 4 mg (4 mg Intravenous Given 2/28/21 1150)   multi-electrolyte (PLASMALYTE-A/ISOLYTE-S PH 7 4) IV solution (150 mL/hr Intravenous New Bag 2/28/21 1143)   promethazine (PHENERGAN) injection 25 mg (has no administration in time range)   sodium chloride 0 9 % bolus 1,000 mL (1,000 mL Intravenous New Bag 2/28/21 0446)   OLANZapine (ZyPREXA) IM injection 5 mg (5 mg Intramuscular Given 2/28/21 0443)   sterile water injection **ADS Override Pull** (10 mL  Given 2/28/21 0443)   ondansetron (ZOFRAN) injection 4 mg (4 mg Intravenous Given 2/28/21 0544)   sodium chloride 0 9 % bolus 1,000 mL (1,000 mL Intravenous New Bag 2/28/21 0715)       Diagnostic Studies  Results Reviewed     Procedure Component Value Units Date/Time    Urine Microscopic [[id number]]  (Abnormal) Collected: 02/28/21 1007    Lab Status: Final result Specimen: Urine, Clean Catch Updated: 02/28/21 1019     RBC, UA 2-4 /hpf      WBC, UA 2-4 /hpf      Epithelial Cells Occasional /hpf      Bacteria, UA Occasional /hpf      Hyaline Casts, UA 5-10 /lpf     POCT pregnancy, urine [[id number]]  (Normal) Resulted: 02/28/21 1012    Lab Status: Final result Updated: 02/28/21 1012     EXT PREG TEST UR (Ref: Negative) negative     Control valid    POCT urinalysis dipstick [[id number]]  (Abnormal) Resulted: 02/28/21 1012    Lab Status: Final result Updated: 02/28/21 1012    Urine Macroscopic, POC [[id number]]  (Abnormal) Collected: 02/28/21 1007    Lab Status: Final result Specimen: Urine Updated: 02/28/21 1008     Color, UA Yellow     Clarity, UA Clear     pH, UA 6 0     Leukocytes, UA Negative     Nitrite, UA Negative     Protein, UA 30 (1+) mg/dl      Glucose, UA Negative mg/dl      Ketones, UA >=160 (4+) mg/dl      Urobilinogen, UA 0 2 E U /dl      Bilirubin, UA Negative     Blood, UA Moderate     Specific Gravity, UA >=1 030    Narrative:      CLINITEK RESULT    CBC and differential [[id number]]  (Abnormal) Collected: 02/28/21 0503    Lab Status: Final result Specimen: Blood from Arm, Right Updated: 02/28/21 0606     WBC 18 09 Thousand/uL      RBC 4 96 Million/uL      Hemoglobin 14 7 g/dL      Hematocrit 42 8 %      MCV 86 fL      MCH 29 6 pg      MCHC 34 3 g/dL      RDW 12 6 %      MPV 9 9 fL      Platelets 756 Thousands/uL      nRBC 0 /100 WBCs      Neutrophils Relative 93 %      Immat GRANS % 1 %      Lymphocytes Relative 4 %      Monocytes Relative 2 %      Eosinophils Relative 0 %      Basophils Relative 0 %      Neutrophils Absolute 16 80 Thousands/µL      Immature Grans Absolute 0 17 Thousand/uL      Lymphocytes Absolute 0 66 Thousands/µL      Monocytes Absolute 0 37 Thousand/µL      Eosinophils Absolute 0 01 Thousand/µL      Basophils Absolute 0 08 Thousands/µL     Narrative: This is an appended report  These results have been appended to a previously verified report      Comprehensive metabolic panel [[id number]]  (Abnormal) Collected: 02/28/21 0436    Lab Status: Final result Specimen: Blood from Arm, Right Updated: 02/28/21 0504     Sodium 141 mmol/L      Potassium 3 7 mmol/L      Chloride 108 mmol/L      CO2 25 mmol/L      ANION GAP 8 mmol/L      BUN 13 mg/dL Creatinine 0 99 mg/dL      Glucose 170 mg/dL      Calcium 10 3 mg/dL      AST 19 U/L      ALT 40 U/L      Alkaline Phosphatase 84 U/L      Total Protein 8 5 g/dL      Albumin 5 1 g/dL      Total Bilirubin 2 07 mg/dL      eGFR 78 ml/min/1 73sq m     Narrative:      [place] guidelines for Chronic Kidney Disease (CKD):     Stage 1 with normal or high GFR (GFR > 90 mL/min/1 73 square meters)    Stage 2 Mild CKD (GFR = 60-89 mL/min/1 73 square meters)    Stage 3A Moderate CKD (GFR = 45-59 mL/min/1 73 square meters)    Stage 3B Moderate CKD (GFR = 30-44 mL/min/1 73 square meters)    Stage 4 Severe CKD (GFR = 15-29 mL/min/1 73 square meters)    Stage 5 End Stage CKD (GFR <15 mL/min/1 73 square meters)  Note: GFR calculation is accurate only with a steady state creatinine                 No orders to display         Procedures  Procedures      ED Course  ED Course as of Feb 28 1534   Sun Feb 28, 2021   0502 Procedure Note: EKG  Date/Time: 02/28/21 5:01 AM   Interpreted by: Jody Mendoza  Indications / Diagnosis: Hx prolonged QT  ECG reviewed by me, the ED Provider: yes   The EKG demonstrates:  Rate: 75  Rhythm: sinus arrhythmia  Intervals: , otherwise normal intervals  Axis: normal axis  QRS/Blocks: normal QRS  ST Changes: No acute ST Changes, no STD/CHRISTOPHER                                           MDM  Number of Diagnoses or Management Options  Hyperbilirubinemia:   Intractable vomiting:   Leukocytosis:   Diagnosis management comments: 68-year-old female with history of cyclical vomiting syndrome presents to emergency department for intractable vomiting  Abdominal pain and she says this feels like typical flare of her cyclical vomiting syndrome  Do not feel there is indication for imaging at this time  Gave patient Zofran and Zyprexa without relief  Discussed with admitting physician who agrees to accept patient for further management  Patient remains stable throughout ED course  Disposition  Final diagnoses:   Intractable vomiting   Leukocytosis   Hyperbilirubinemia     Time reflects when diagnosis was documented in both MDM as applicable and the Disposition within this note     Time User Action Codes Description Comment    2/28/2021  6:34 AM Minor, Jody Add [R11 10] Intractable vomiting     2/28/2021  6:34 AM Minor, Jody Add [D72 819] Leukocytopenia     2/28/2021  6:34 AM Minor, Jody Add [D72 829] Leukocytosis     2/28/2021  6:34 AM Minor, Jody Remove [D72 819] Leukocytopenia     2/28/2021  6:34 AM Minor, Jody Add [E80 6] Hyperbilirubinemia     2/28/2021  7:17 AM Minor, Jody Add [R11 2] Nausea & vomiting     2/28/2021  7:17 AM Minor, Jody Remove [R11 2] Nausea & vomiting       ED Disposition     ED Disposition Condition Date/Time Comment    Admit Stable Sun Feb 28, 2021  7:17 AM Case was discussed with HELDER and the patient's admission status was agreed to be Admission Status: observation status to the service of SL[facility]   Follow-up Information    None         Current Discharge Medication List      CONTINUE these medications which have NOT CHANGED    Details   ALPRAZolam (XANAX) 0 25 mg tablet Take 1 tablet (0 25 mg total) by mouth 2 (two) times a day  Qty: 180 tablet, Refills: 0    Associated Diagnoses: Anxiety      mirtazapine (REMERON) 15 mg tablet 1  1/2 tabs po qhs  Qty: 135 tablet, Refills: 1    Associated Diagnoses: Anxiety      norethindrone (MICRONOR) 0 35 MG tablet TAKE 1 TABLET BY MOUTH EVERY DAY  Qty: 84 tablet, Refills: 1    Associated Diagnoses: Cyclical vomiting with nausea           No discharge procedures on file  PDMP Review       Value Time User    PDMP Reviewed  Yes 2/28/2021  7:32 AM Kyler Taylor DO           ED Provider  Attending physically available and evaluated Alpesh Lopez  SAMANTHA managed the patient along with the ED Attending      Electronically Signed by         Dakota Gamez MD  02/28/21 1483

## 2021-02-28 NOTE — ED NOTES
Patient aware of need for urine sample but cannot provide one at this time     Brenda Iyer RN  02/28/21 8060

## 2021-02-28 NOTE — H&P
H&P- Tara Rico 1995, 30 y o  female MRN: [id number]    Unit/Bed#: ED 27 Encounter: [id number]    Primary Care Provider: Luz Paul MD   Date and time admitted to hospital: 2/28/2021  4:08 AM        * Intractable cyclical vomiting with nausea  Assessment & Plan  · Known history of cyclical vomiting, developed intractable nausea/vomiting this evening  Did not respond to IV fluids, Zofran, Zyprexa in ED  · Admit as observation for further treatment of intractable nausea and vomiting  · Will continue IV fluids for now, advance diet as tolerated  · Alternate between p r n  Zofran and p r n  Phenergan for antiemetic, monitor QTC with serial EKG  · If patient should develop significant abdominal pain will obtain CT abdomen/pelvis    Diarrhea  Assessment & Plan  · Reports onset of diarrhea with nausea/vomiting  · Supportive care, IVF as above    Marijuana use  Assessment & Plan  · Admits to marijuana use even prior to onset  · Counseled on cessation    Leukocytosis  Assessment & Plan  · WBC 18 on admission; with nausea/vomiting/diarrhea but without other signs of infection  · Observe off antibiotics for now, trend WBC and fever curve    Anxiety  Assessment & Plan  · PDMP reviewed, continue mirtazapine and Xanax    Prolonged QT interval syndrome  Assessment & Plan  · Reported history of prolonged QT, current EKG QTC 399ms  · Will monitor with serial EKGs, adjust antiemetic regimen if prolongation should develop      VTE Prophylaxis: Pharmacologic VTE Prophylaxis contraindicated due to low risk  / sequential compression device and reason for no mechanical VTE prophylaxis low risk, ambulation encouraged   Code Status: Full code  POLST: POLST form is not discussed and not completed at this time  Anticipated Length of Stay:  Patient will be admitted on an Observation basis with an anticipated length of stay of  Less than 2 midnights     Justification for Hospital Stay: Please see detailed plans noted above     Chief Complaint:     Intractable nausea and vomiting  History of Present Illness:  Collette German is a 30 y o  female who has past medical history significant for cyclical vomiting syndrome, anxiety, marijuana use  Presents with development of intractable nausea and vomiting this evening with reported "30" episodes of nonbloody, nonbilious vomiting with subsequent development of dry heaves  Additionally endorsed some watery diarrhea with symptoms  Admits to using marijuana, and states she took a hot bath in attempt to alleviate symptoms, however this did not work  Now admits to chills but denies fevers, dizziness/lightheadedness, abdominal pain, shortness of breath, or headache  Does endorse some burning in chest particularly following vomiting episodes  Received IV fluids, Zofran, Zyprexa in ED without improvement in symptoms  Subsequently is admitted as observation for further management of intractable nausea and vomiting      Review of Systems:    Constitutional:  Denies fever but endorses chills and decreased appetite  Eyes:  Denies change in visual acuity   HENT:  Denies nasal congestion or sore throat   Respiratory:  Denies cough or shortness of breath   Cardiovascular:  Denies chest pain or edema   GI:  Denies abdominal pain but endorses nausea, vomiting, diarrhea  :  Denies dysuria   Musculoskeletal:  Denies back pain or joint pain   Integument:  Denies rash   Neurologic:  Denies headache, focal weakness or sensory changes   Endocrine:  Denies polyuria or polydipsia   Lymphatic:  Denies swollen glands   Psychiatric:  Denies depression or anxiety     Past Medical and Surgical History:   Past Medical History:   Diagnosis Date    Cyclical vomiting syndrome     Depression     Functional dyspnea      Past Surgical History:   Procedure Laterality Date    CHOLECYSTECTOMY  07/2018    TONSILLECTOMY         Meds/Allergies:    Current Facility-Administered Medications:     sodium chloride 0 9 % bolus 1,000 mL, 1,000 mL, Intravenous, Once, Jody Mendoza MD    Current Outpatient Medications:     ALPRAZolam (XANAX) 0 25 mg tablet, Take 1 tablet (0 25 mg total) by mouth 2 (two) times a day, Disp: 180 tablet, Rfl: 0    mirtazapine (REMERON) 15 mg tablet, 1  1/2 tabs po qhs, Disp: 135 tablet, Rfl: 1    norethindrone (MICRONOR) 0 35 MG tablet, TAKE 1 TABLET BY MOUTH EVERY DAY, Disp: 84 tablet, Rfl: 1    Allergies: Allergies   Allergen Reactions    Metoclopramide Other (See Comments)     racing heart, anxious, agitated     History:  Marital Status: Single     Substance Use History:   Social History     Substance and Sexual Activity   Alcohol Use No     Social History     Tobacco Use   Smoking Status Never Smoker   Smokeless Tobacco Never Used     Social History     Substance and Sexual Activity   Drug Use Yes    Types: Marijuana       Family History:  Family History   Problem Relation Age of Onset    Arthritis Mother     No Known Problems Father        Physical Exam:     Vitals:   Blood Pressure: 117/80 (02/28/21 0411)  Pulse: 60 (02/28/21 0411)  Temperature: 98 5 °F (36 9 °C) (02/28/21 0411)  Temp Source: Oral (02/28/21 0411)  Respirations: 18 (02/28/21 0411)  Weight - Scale: 67 1 kg (148 lb) (02/28/21 0411)  SpO2: 96 % (02/28/21 0411)    Constitutional:  Well developed, well nourished, uncomfortable appearing with shaking, non-toxic appearance   Eyes:  PERRL, conjunctiva normal   HENT:  Atraumatic, external ears normal, nose normal, oropharynx moist, no pharyngeal exudates  Neck- normal range of motion, no tenderness, supple   Respiratory:  No respiratory distress, normal breath sounds, no rales, no wheezing   Cardiovascular:  Normal rate, normal rhythm, no murmurs, no gallops, no rubs   GI:  Soft, nondistended, normal bowel sounds, nontender, no organomegaly, no mass, no rebound, no guarding   :  No costovertebral angle tenderness   Musculoskeletal:  No edema, no tenderness, no deformities  Back- no tenderness  Integument:  Well hydrated, no rash   Lymphatic:  No lymphadenopathy noted   Neurologic:  Alert &awake, communicative, CN 2-12 normal, normal motor function, normal sensory function, no focal deficits noted   Psychiatric:  Speech and behavior appropriate       Lab Results: I have personally reviewed pertinent reports  Results from last 7 days   Lab Units 02/28/21  0503   WBC Thousand/uL 18 09*   HEMOGLOBIN g/dL 14 7   HEMATOCRIT % 42 8   PLATELETS Thousands/uL 331   NEUTROS PCT % 93*   LYMPHS PCT % 4*   MONOS PCT % 2*   EOS PCT % 0     Results from last 7 days   Lab Units 02/28/21  0436   POTASSIUM mmol/L 3 7   CHLORIDE mmol/L 108   CO2 mmol/L 25   BUN mg/dL 13   CREATININE mg/dL 0 99   CALCIUM mg/dL 10 3*   ALK PHOS U/L 84   ALT U/L 40   AST U/L 19           EKG: Sinus rhythm with sinus arrhythmia, QTc 399      ** Please Note: Dragon 360 Dictation voice to text software was used in the creation of this document   **

## 2021-02-28 NOTE — QUICK NOTE
Post midnight follow up : pt admitted with intractable nausea and vomiting  Hx of significant cyclical vomiting syndrome, anxiety and marijuana use  Pt seen today this afternoon early evening sp an episode of clear vomiting, she states she has not vomited all day and she has just had a little clears today      = continue ivfluids at this time   - repeat labs in the am   - can have toast and crackers   - pt on xanax bid   - discussed with nursing who will administer phenergan now

## 2021-03-01 PROBLEM — E87.6 HYPOKALEMIA: Status: ACTIVE | Noted: 2021-03-01

## 2021-03-01 LAB
ALBUMIN SERPL BCP-MCNC: 4.4 G/DL (ref 3.5–5)
ALP SERPL-CCNC: 71 U/L (ref 46–116)
ALT SERPL W P-5'-P-CCNC: 28 U/L (ref 12–78)
ANION GAP SERPL CALCULATED.3IONS-SCNC: 8 MMOL/L (ref 4–13)
AST SERPL W P-5'-P-CCNC: 15 U/L (ref 5–45)
BASOPHILS # BLD AUTO: 0.04 THOUSANDS/ΜL (ref 0–0.1)
BASOPHILS NFR BLD AUTO: 0 % (ref 0–1)
BILIRUB DIRECT SERPL-MCNC: 0.45 MG/DL (ref 0–0.2)
BILIRUB SERPL-MCNC: 2.36 MG/DL (ref 0.2–1)
BUN SERPL-MCNC: 10 MG/DL (ref 5–25)
CALCIUM SERPL-MCNC: 9.1 MG/DL (ref 8.3–10.1)
CHLORIDE SERPL-SCNC: 109 MMOL/L (ref 100–108)
CO2 SERPL-SCNC: 24 MMOL/L (ref 21–32)
CREAT SERPL-MCNC: 0.75 MG/DL (ref 0.6–1.3)
EOSINOPHIL # BLD AUTO: 0 THOUSAND/ΜL (ref 0–0.61)
EOSINOPHIL NFR BLD AUTO: 0 % (ref 0–6)
ERYTHROCYTE [DISTWIDTH] IN BLOOD BY AUTOMATED COUNT: 12.9 % (ref 11.6–15.1)
GFR SERPL CREATININE-BSD FRML MDRD: 110 ML/MIN/1.73SQ M
GLUCOSE P FAST SERPL-MCNC: 107 MG/DL (ref 65–99)
GLUCOSE SERPL-MCNC: 107 MG/DL (ref 65–140)
HCT VFR BLD AUTO: 38.2 % (ref 34.8–46.1)
HGB BLD-MCNC: 12.7 G/DL (ref 11.5–15.4)
IMM GRANULOCYTES # BLD AUTO: 0.13 THOUSAND/UL (ref 0–0.2)
IMM GRANULOCYTES NFR BLD AUTO: 1 % (ref 0–2)
LYMPHOCYTES # BLD AUTO: 1.14 THOUSANDS/ΜL (ref 0.6–4.47)
LYMPHOCYTES NFR BLD AUTO: 8 % (ref 14–44)
MCH RBC QN AUTO: 29.2 PG (ref 26.8–34.3)
MCHC RBC AUTO-ENTMCNC: 33.2 G/DL (ref 31.4–37.4)
MCV RBC AUTO: 88 FL (ref 82–98)
MONOCYTES # BLD AUTO: 0.9 THOUSAND/ΜL (ref 0.17–1.22)
MONOCYTES NFR BLD AUTO: 6 % (ref 4–12)
NEUTROPHILS # BLD AUTO: 12.21 THOUSANDS/ΜL (ref 1.85–7.62)
NEUTS SEG NFR BLD AUTO: 85 % (ref 43–75)
NRBC BLD AUTO-RTO: 0 /100 WBCS
PLATELET # BLD AUTO: 267 THOUSANDS/UL (ref 149–390)
PMV BLD AUTO: 10.6 FL (ref 8.9–12.7)
POTASSIUM SERPL-SCNC: 3.4 MMOL/L (ref 3.5–5.3)
PROT SERPL-MCNC: 7.3 G/DL (ref 6.4–8.2)
RBC # BLD AUTO: 4.35 MILLION/UL (ref 3.81–5.12)
SODIUM SERPL-SCNC: 141 MMOL/L (ref 136–145)
WBC # BLD AUTO: 14.42 THOUSAND/UL (ref 4.31–10.16)

## 2021-03-01 PROCEDURE — 85025 COMPLETE CBC W/AUTO DIFF WBC: CPT | Performed by: NURSE PRACTITIONER

## 2021-03-01 PROCEDURE — 99225 PR SBSQ OBSERVATION CARE/DAY 25 MINUTES: CPT | Performed by: NURSE PRACTITIONER

## 2021-03-01 PROCEDURE — 80053 COMPREHEN METABOLIC PANEL: CPT | Performed by: NURSE PRACTITIONER

## 2021-03-01 PROCEDURE — 82248 BILIRUBIN DIRECT: CPT | Performed by: INTERNAL MEDICINE

## 2021-03-01 PROCEDURE — 99204 OFFICE O/P NEW MOD 45 MIN: CPT | Performed by: INTERNAL MEDICINE

## 2021-03-01 RX ORDER — LORAZEPAM 2 MG/ML
0.25 INJECTION INTRAMUSCULAR 3 TIMES DAILY
Status: DISCONTINUED | OUTPATIENT
Start: 2021-03-01 | End: 2021-03-01

## 2021-03-01 RX ORDER — POTASSIUM CHLORIDE 14.9 MG/ML
20 INJECTION INTRAVENOUS ONCE
Status: COMPLETED | OUTPATIENT
Start: 2021-03-01 | End: 2021-03-01

## 2021-03-01 RX ORDER — DEXTROSE AND SODIUM CHLORIDE 5; .9 G/100ML; G/100ML
125 INJECTION, SOLUTION INTRAVENOUS CONTINUOUS
Status: DISCONTINUED | OUTPATIENT
Start: 2021-03-01 | End: 2021-03-02 | Stop reason: HOSPADM

## 2021-03-01 RX ORDER — MAGNESIUM SULFATE HEPTAHYDRATE 40 MG/ML
2 INJECTION, SOLUTION INTRAVENOUS ONCE
Status: COMPLETED | OUTPATIENT
Start: 2021-03-01 | End: 2021-03-01

## 2021-03-01 RX ORDER — POTASSIUM CHLORIDE 20 MEQ/1
40 TABLET, EXTENDED RELEASE ORAL ONCE
Status: DISCONTINUED | OUTPATIENT
Start: 2021-03-01 | End: 2021-03-01

## 2021-03-01 RX ORDER — LORAZEPAM 2 MG/ML
0.5 INJECTION INTRAMUSCULAR 3 TIMES DAILY
Status: DISCONTINUED | OUTPATIENT
Start: 2021-03-01 | End: 2021-03-02 | Stop reason: HOSPADM

## 2021-03-01 RX ADMIN — POTASSIUM CHLORIDE 20 MEQ: 14.9 INJECTION, SOLUTION INTRAVENOUS at 13:06

## 2021-03-01 RX ADMIN — PROMETHAZINE HYDROCHLORIDE 25 MG: 25 INJECTION INTRAMUSCULAR; INTRAVENOUS at 03:27

## 2021-03-01 RX ADMIN — NORETHINDRONE 0.35 MG: 0.35 TABLET ORAL at 22:11

## 2021-03-01 RX ADMIN — MIRTAZAPINE 22.5 MG: 15 TABLET, FILM COATED ORAL at 21:59

## 2021-03-01 RX ADMIN — ONDANSETRON 8 MG: 2 SOLUTION INTRAMUSCULAR; INTRAVENOUS at 12:30

## 2021-03-01 RX ADMIN — SODIUM CHLORIDE, SODIUM GLUCONATE, SODIUM ACETATE, POTASSIUM CHLORIDE, MAGNESIUM CHLORIDE, SODIUM PHOSPHATE, DIBASIC, AND POTASSIUM PHOSPHATE 150 ML/HR: .53; .5; .37; .037; .03; .012; .00082 INJECTION, SOLUTION INTRAVENOUS at 03:16

## 2021-03-01 RX ADMIN — DEXTROSE AND SODIUM CHLORIDE 125 ML/HR: 5; .9 INJECTION, SOLUTION INTRAVENOUS at 19:17

## 2021-03-01 RX ADMIN — MAGNESIUM SULFATE HEPTAHYDRATE 2 G: 40 INJECTION, SOLUTION INTRAVENOUS at 15:39

## 2021-03-01 RX ADMIN — LORAZEPAM 0.5 MG: 2 INJECTION INTRAMUSCULAR; INTRAVENOUS at 21:55

## 2021-03-01 RX ADMIN — POTASSIUM CHLORIDE 20 MEQ: 14.9 INJECTION, SOLUTION INTRAVENOUS at 10:35

## 2021-03-01 RX ADMIN — ONDANSETRON 8 MG: 2 SOLUTION INTRAMUSCULAR; INTRAVENOUS at 19:14

## 2021-03-01 RX ADMIN — LORAZEPAM 0.25 MG: 2 INJECTION INTRAMUSCULAR; INTRAVENOUS at 09:30

## 2021-03-01 RX ADMIN — ONDANSETRON 4 MG: 2 INJECTION INTRAMUSCULAR; INTRAVENOUS at 06:53

## 2021-03-01 RX ADMIN — DEXTROSE AND SODIUM CHLORIDE 125 ML/HR: 5; .9 INJECTION, SOLUTION INTRAVENOUS at 10:35

## 2021-03-01 RX ADMIN — LORAZEPAM 0.5 MG: 2 INJECTION INTRAMUSCULAR; INTRAVENOUS at 15:40

## 2021-03-01 RX ADMIN — PROMETHAZINE HYDROCHLORIDE 25 MG: 25 INJECTION INTRAMUSCULAR; INTRAVENOUS at 15:39

## 2021-03-01 NOTE — UTILIZATION REVIEW
Initial Clinical Review    Admission: Date/Time/Statement:   Admission Orders (From admission, onward)     Ordered        02/28/21 0716  Place in Observation  Once                   Orders Placed This Encounter   Procedures    Place in Observation     Standing Status:   Standing     Number of Occurrences:   1     Order Specific Question:   Level of Care     Answer:   Med Surg [16]     ED Arrival Information     Expected Arrival Acuity Means of Arrival Escorted By Service Admission Type    - 2/28/2021 03:54 Urgent Walk-In Self Hospitalist Urgent    Arrival Complaint    Vomiting; Dizziness        Chief Complaint   Patient presents with    Vomiting     cyclic vomiting syndrome hx  pt reports same today  reports vomiting all day     Assessment/Plan:  31 yo female presented to ED from home as observation status for intractable cyclical vomiting with nausea  PMHX of cyclical vomiting syndrome   Patient says she developed nausea and vomiting this morning and has had >10 episodes of vomiting throughout the day  She smoked marijuana and took a hot bath, which usually help with nausea, but no relief with these therapies  She has also had a couple loose stools  No abdominal pain  On exam  Dry heaving,appear uncomfortable  Plan IVF,IVF antiemetics,diet as tolerate, and supportive care     Prolonged QT interval syndrome  Assessment & Plan  · Reported history of prolonged QT, current EKG QTC 399ms  · Will monitor with serial EKGs, adjust antiemetic regimen if prolongation should develop        ED Triage Vitals   Temperature Pulse Respirations Blood Pressure SpO2   02/28/21 0411 02/28/21 0411 02/28/21 0411 02/28/21 0411 02/28/21 0411   98 5 °F (36 9 °C) 60 18 117/80 96 %      Temp Source Heart Rate Source Patient Position - Orthostatic VS BP Location FiO2 (%)   02/28/21 0411 02/28/21 0411 02/28/21 0411 02/28/21 0411 --   Oral Monitor Sitting Right arm       Pain Score       02/28/21 1134       7          Wt Readings from Last 1 Encounters:   02/28/21 67 1 kg (148 lb)     Additional Vital Signs:   Date/Time  Temp  Pulse  Resp  BP  MAP (mmHg)  SpO2  O2 Device  Patient Position - Orthostatic VS   03/01/21 0700  97 5 °F (36 4 °C)  83  18  144/65  94  97 %  --  --   02/28/21 2307  98 6 °F (37 °C)  65  16  109/64  --  100 %  None (Room air)  Lying   02/28/21 1500  98 1 °F (36 7 °C)  63  --  128/73  --  100 %  --  Lying   02/28/21 1100  98 1 °F (36 7 °C)  77  15  119/64  83  100 %  --  Lying       Pertinent Labs/Diagnostic Test Results:       Results from last 7 days   Lab Units 03/01/21  0632 02/28/21  0503   WBC Thousand/uL 14 42* 18 09*   HEMOGLOBIN g/dL 12 7 14 7   HEMATOCRIT % 38 2 42 8   PLATELETS Thousands/uL 267 331   NEUTROS ABS Thousands/µL 12 21* 16 80*         Results from last 7 days   Lab Units 03/01/21  0632 02/28/21  0436   SODIUM mmol/L 141 141   POTASSIUM mmol/L 3 4* 3 7   CHLORIDE mmol/L 109* 108   CO2 mmol/L 24 25   ANION GAP mmol/L 8 8   BUN mg/dL 10 13   CREATININE mg/dL 0 75 0 99   EGFR ml/min/1 73sq m 110 78   CALCIUM mg/dL 9 1 10 3*     Results from last 7 days   Lab Units 03/01/21  0632 02/28/21  0436   AST U/L 15 19   ALT U/L 28 40   ALK PHOS U/L 71 84   TOTAL PROTEIN g/dL 7 3 8 5*   ALBUMIN g/dL 4 4 5 1*   TOTAL BILIRUBIN mg/dL 2 36* 2 07*         Results from last 7 days   Lab Units 03/01/21  0632 02/28/21  0436   GLUCOSE RANDOM mg/dL 107 170*     Results from last 7 days   Lab Units 02/28/21  1007   CLARITY UA  Clear   COLOR UA  Yellow   SPEC GRAV UA  >=1 030   PH UA  6 0   GLUCOSE UA mg/dl Negative   KETONES UA mg/dl >=160 (4+)*   BLOOD UA  Moderate*   PROTEIN UA mg/dl 30 (1+)*   NITRITE UA  Negative   BILIRUBIN UA  Negative   UROBILINOGEN UA E U /dl 0 2   LEUKOCYTES UA  Negative   WBC UA /hpf 2-4   RBC UA /hpf 2-4   BACTERIA UA /hpf Occasional   EPITHELIAL CELLS WET PREP /hpf Occasional       ED Treatment:   Medication Administration from 02/28/2021 0354 to 02/28/2021 1100       Date/Time Order Dose Route Action 02/28/2021 0446 sodium chloride 0 9 % bolus 1,000 mL 1,000 mL Intravenous New Bag     02/28/2021 0443 OLANZapine (ZyPREXA) IM injection 5 mg 5 mg Intramuscular Given     02/28/2021 0443 sterile water injection **ADS Override Pull** 10 mL  Given     02/28/2021 0544 ondansetron (ZOFRAN) injection 4 mg 4 mg Intravenous Given     02/28/2021 0715 sodium chloride 0 9 % bolus 1,000 mL 1,000 mL Intravenous New Bag        EKG 02-28-21  Sinus rhythm with sinus arrhythmia with short ME  Nonspecific T wave abnormality  Abnormal ECG  Premature supraventricular complexes are no longer Present  QT has shortened      Past Medical History:   Diagnosis Date    Cyclical vomiting syndrome     Depression     Functional dyspnea      Present on Admission:   Intractable cyclical vomiting with nausea   Diarrhea   Anxiety   Prolonged QT interval syndrome   Leukocytosis   Marijuana use      Admitting Diagnosis: Hyperbilirubinemia [E80 6]  Leukocytosis [D72 829]  Vomiting [R11 10]  Intractable vomiting [R11 10]  Age/Sex: 30 y o  female  Admission Orders:  Scheduled Medications:  ALPRAZolam, 0 25 mg, Oral, BID  magnesium sulfate, 2 g, Intravenous, Once  mirtazapine, 22 5 mg, Oral, HS  norethindrone, 1 tablet, Oral, Daily  potassium chloride, 40 mEq, Oral, Once  potassium chloride, 20 mEq, Intravenous, Once      Continuous IV Infusions:  multi-electrolyte, 100 mL/hr, Intravenous, Continuous      PRN Meds:  ondansetron, 4 mg, Intravenous, Q6H PRN  promethazine, 25 mg, Intramuscular, Q6H PRN        None    Network Utilization Review Department  ATTENTION: Please call with any questions or concerns to [phone] and carefully listen to the prompts so that you are directed to the right person  All voicemails are confidential   Yari Caruso all requests for admission clinical reviews, approved or denied determinations and any other requests to dedicated fax number below belonging to the campus where the patient is receiving treatment   List of dedicated fax numbers for the Facilities:  [address] [place] [address] DENIALS (Administrative/Medical Necessity) [phone]   [address] (Maternity/NICU/Pediatrics) [phone] [address] [facility] [address] Isaac Vidal [address] (  [address]Emily[address]) [address] [place] [address] Arun Farr [address] [place] [address] [phone]

## 2021-03-01 NOTE — CONSULTS
Consult Service: Gastroenterology      PATIENT INFORMATION      Gladis Henry 30 y o  female MRN: [id number]  Unit/Bed#: [facility] 315-01 Encounter: [id number]  PCP: Dolly Benavidez MD  Date of Admission:  2/28/2021  Date of Consultation: 03/01/21  Requesting Physician: Adebayo Amezquita MD       ASSESSMENTS & PLAN   Gladis Henry is a 30 y o  old female with PMH of cyclic vomiting syndrome, marijuana abuse, anxiety/depression who presented with intractable nausea/vomiting    Intractable nausea/vomiting - secondary to cyclic vomiting syndrome and/or cannabinoid hyperemesis syndrome  Patient state she had EGD 2-3 years ago that was normal   Gastric emptying study in 2017 was normal   · Schedule Zofran 8 mg q 6 hours  QTC on admission was 399  Will continue to monitor daily  EKG order place for tomorrow AM   · Increase IV Ativan to 0 5 mg t i d   · Okay for patient to try hot showers as she says she responds very well to them  · Counseled on marijuana cessation  · Switch IVF to D5NS 125 cc/hr  · Consider amitriptyline at discharge or as outpt for [facility] prophylaxis in conjunction with her depression  · Counseled on avoiding triggers including stress/anxiety, hypoglycemia and sleep deprivation  · Keep room dark and quiet  · Will need control of anxiety/depression    Marijuana use - daily usage  Counseled in the past to stop but states it helps her symptoms  · Spoke to patient in length regarding marijuana cessation  She states she will consider it  REASON FOR CONSULTATION      Intractable nausea and vomiting      HISTORY OF PRESENT ILLNESS      Gladis Henry is a 30 y o  old female with PMH of cyclic vomiting syndrome, marijuana abuse, anxiety/depression who presented with intractable nausea/vomiting  Patient states 2 days ago she started experiencing nausea and vomiting    She states she has been vomiting 20-30 times per day, nonbloody/nonbilious with mild lower abdominal cramping that is worse with vomiting  Otherwise no change in bowel habits  No overt bleeding  Not using NSAIDs or blood thinners  She states last EGD in 2018 was normal why do not have these results  No previous colonoscopy  No significant alcohol use  Does smoke marijuana daily despite being counseled otherwise  No family history of GI cancer  REVIEW OF SYSTEMS     A thorough 12-point review of systems has been conducted  Pertinent positives and negatives are mentioned in the history of present illness  PAST MEDICAL & SURGICAL HISTORY      Past Medical History:   Diagnosis Date    Cyclical vomiting syndrome     Depression     Functional dyspnea        Past Surgical History:   Procedure Laterality Date    CHOLECYSTECTOMY  07/2018    TONSILLECTOMY           MEDICATIONS & ALLERGIES       Medications:   Prior to Admission medications    Medication Sig Start Date End Date Taking? Authorizing Provider   ALPRAZolam [place]) 0 25 mg tablet Take 1 tablet (0 25 mg total) by mouth 2 (two) times a day 10/16/20   Sean Lane MD   mirtazapine (REMERON) 15 mg tablet 1  1/2 tabs po qhs 10/16/20   Sean Lane MD   norethindrone (MICRONOR) 0 35 MG tablet TAKE 1 TABLET BY MOUTH EVERY DAY 2/7/21   Sean Lane MD       Allergies:    Allergies   Allergen Reactions    Metoclopramide Other (See Comments)     racing heart, anxious, agitated         SOCIAL HISTORY      Marital Status: Single    Substance Use History:   Social History     Substance and Sexual Activity   Alcohol Use No     Social History     Tobacco Use   Smoking Status Never Smoker   Smokeless Tobacco Never Used     Social History     Substance and Sexual Activity   Drug Use Yes    Types: Marijuana         FAMILY HISTORY      Non-Contributory      PHYSICAL EXAM     Vitals:   Blood Pressure: 144/65 (03/01/21 0700)  Pulse: 83 (03/01/21 0700)  Temperature: 97 5 °F (36 4 °C) (03/01/21 0700)  Temp Source: Oral (03/01/21 0700)  Respirations: 18 (03/01/21 0700)  Weight - Scale: 67 1 kg (148 lb) (02/28/21 0411)  SpO2: 97 % (03/01/21 0700)    Physical Exam:   GENERAL: NAD  HEENT:  NC/AT, no scleral icterus  CARDIAC:  RRR, +S1/S2  PULMONARY:  CTA B/L, no wheezing/rales/rhonci, non-labored breathing  ABDOMEN:  Soft, mild tenderness palpation epigastric region, +BS, no rebound/guarding/rigidity  Extremities:  No edema, cyanosis, or clubbing  NEUROLOGIC:  Alert  SKIN:  No rashes or erythema    ADDITIONAL DATA     Lab Results:     Results from last 7 days   Lab Units 03/01/21  0632   WBC Thousand/uL 14 42*   HEMOGLOBIN g/dL 12 7   HEMATOCRIT % 38 2   PLATELETS Thousands/uL 267   NEUTROS PCT % 85*   LYMPHS PCT % 8*   MONOS PCT % 6   EOS PCT % 0     Results from last 7 days   Lab Units 03/01/21  0632   POTASSIUM mmol/L 3 4*   CHLORIDE mmol/L 109*   CO2 mmol/L 24   BUN mg/dL 10   CREATININE mg/dL 0 75   CALCIUM mg/dL 9 1   ALK PHOS U/L 71   ALT U/L 28   AST U/L 15           Imaging:    No results found  EKG, Pathology, and Other Studies Reviewed on Admission:   · EKG: Reviewed      Counseling / Coordination of Care Time: 30 total mins spent n consult  Greater than 50% of total time spent on patient counseling and coordination of care     ** Please Note: This note is constructed using a voice recognition dictation system   **

## 2021-03-01 NOTE — PLAN OF CARE
Problem: Nutrition/Hydration-ADULT  Goal: Nutrient/Hydration intake appropriate for improving, restoring or maintaining nutritional needs  Description: Monitor and assess patient's nutrition/hydration status for malnutrition  Collaborate with interdisciplinary team and initiate plan and interventions as ordered  Monitor patient's weight and dietary intake as ordered or per policy  Utilize nutrition screening tool and intervene as necessary  Determine patient's food preferences and provide high-protein, high-caloric foods as appropriate       INTERVENTIONS:  - Monitor oral intake, urinary output, labs, and treatment plans  - Assess nutrition and hydration status and recommend course of action  - Evaluate amount of meals eaten  - Assist patient with eating if necessary   - Allow adequate time for meals  - Recommend/ encourage appropriate diets, oral nutritional supplements, and vitamin/mineral supplements  - Order, calculate, and assess calorie counts as needed  - Recommend, monitor, and adjust tube feedings and TPN/PPN based on assessed needs  - Assess need for intravenous fluids  - Provide specific nutrition/hydration education as appropriate  - Include patient/family/caregiver in decisions related to nutrition  Outcome: Progressing     Problem: PAIN - ADULT  Goal: Verbalizes/displays adequate comfort level or baseline comfort level  Description: Interventions:  - Encourage patient to monitor pain and request assistance  - Assess pain using appropriate pain scale  - Administer analgesics based on type and severity of pain and evaluate response  - Implement non-pharmacological measures as appropriate and evaluate response  - Consider cultural and social influences on pain and pain management  - Notify physician/advanced practitioner if interventions unsuccessful or patient reports new pain  Outcome: Progressing     Problem: INFECTION - ADULT  Goal: Absence or prevention of progression during hospitalization  Description: INTERVENTIONS:  - Assess and monitor for signs and symptoms of infection  - Monitor lab/diagnostic results  - Monitor all insertion sites, i e  indwelling lines, tubes, and drains  - Monitor endotracheal if appropriate and nasal secretions for changes in amount and color  - [place] appropriate cooling/warming therapies per order  - Administer medications as ordered  - Instruct and encourage patient and family to use good hand hygiene technique  - Identify and instruct in appropriate isolation precautions for identified infection/condition  Outcome: Progressing  Goal: Absence of fever/infection during neutropenic period  Description: INTERVENTIONS:  - Monitor WBC    Outcome: Progressing     Problem: SAFETY ADULT  Goal: Patient will remain free of falls  Description: INTERVENTIONS:  - Assess patient frequently for physical needs  -  Identify cognitive and physical deficits and behaviors that affect risk of falls    -  [place] fall precautions as indicated by assessment   - Educate patient/family on patient safety including physical limitations  - Instruct patient to call for assistance with activity based on assessment  - Modify environment to reduce risk of injury  - Consider OT/PT consult to assist with strengthening/mobility  Outcome: Progressing  Goal: Maintain or return to baseline ADL function  Description: INTERVENTIONS:  -  Assess patient's ability to carry out ADLs; assess patient's baseline for ADL function and identify physical deficits which impact ability to perform ADLs (bathing, care of mouth/teeth, toileting, grooming, dressing, etc )  - Assess/evaluate cause of self-care deficits   - Assess range of motion  - Assess patient's mobility; develop plan if impaired  - Assess patient's need for assistive devices and provide as appropriate  - Encourage maximum independence but intervene and supervise when necessary  - Involve family in performance of ADLs  - Assess for home care needs following discharge   - Consider OT consult to assist with ADL evaluation and planning for discharge  - Provide patient education as appropriate  Outcome: Progressing  Goal: Maintain or return mobility status to optimal level  Description: INTERVENTIONS:  - Assess patient's baseline mobility status (ambulation, transfers, stairs, etc )    - Identify cognitive and physical deficits and behaviors that affect mobility  - Identify mobility aids required to assist with transfers and/or ambulation (gait belt, sit-to-stand, lift, walker, cane, etc )  - [place] fall precautions as indicated by assessment  - Record patient progress and toleration of activity level on Mobility SBAR; progress patient to next Phase/Stage  - Instruct patient to call for assistance with activity based on assessment  - Consider rehabilitation consult to assist with strengthening/weightbearing, etc   Outcome: Progressing     Problem: DISCHARGE PLANNING  Goal: Discharge to home or other facility with appropriate resources  Description: INTERVENTIONS:  - Identify barriers to discharge w/patient and caregiver  - Arrange for needed discharge resources and transportation as appropriate  - Identify discharge learning needs (meds, wound care, etc )  - Arrange for interpretive services to assist at discharge as needed  - Refer to Case Management Department for coordinating discharge planning if the patient needs post-hospital services based on physician/advanced practitioner order or complex needs related to functional status, cognitive ability, or social support system  Outcome: Progressing

## 2021-03-02 VITALS
DIASTOLIC BLOOD PRESSURE: 62 MMHG | TEMPERATURE: 98.2 F | SYSTOLIC BLOOD PRESSURE: 105 MMHG | HEART RATE: 74 BPM | RESPIRATION RATE: 18 BRPM | OXYGEN SATURATION: 97 % | WEIGHT: 148 LBS | BODY MASS INDEX: 25.4 KG/M2

## 2021-03-02 PROBLEM — E87.6 HYPOKALEMIA: Status: RESOLVED | Noted: 2021-03-01 | Resolved: 2021-03-02

## 2021-03-02 PROBLEM — R19.7 DIARRHEA: Status: RESOLVED | Noted: 2021-02-28 | Resolved: 2021-03-02

## 2021-03-02 LAB
BASOPHILS # BLD AUTO: 0.03 THOUSANDS/ΜL (ref 0–0.1)
BASOPHILS NFR BLD AUTO: 0 % (ref 0–1)
EOSINOPHIL # BLD AUTO: 0 THOUSAND/ΜL (ref 0–0.61)
EOSINOPHIL NFR BLD AUTO: 0 % (ref 0–6)
ERYTHROCYTE [DISTWIDTH] IN BLOOD BY AUTOMATED COUNT: 12.8 % (ref 11.6–15.1)
HCT VFR BLD AUTO: 39 % (ref 34.8–46.1)
HGB BLD-MCNC: 12.9 G/DL (ref 11.5–15.4)
IMM GRANULOCYTES # BLD AUTO: 0.11 THOUSAND/UL (ref 0–0.2)
IMM GRANULOCYTES NFR BLD AUTO: 1 % (ref 0–2)
LYMPHOCYTES # BLD AUTO: 1.16 THOUSANDS/ΜL (ref 0.6–4.47)
LYMPHOCYTES NFR BLD AUTO: 9 % (ref 14–44)
MCH RBC QN AUTO: 29.2 PG (ref 26.8–34.3)
MCHC RBC AUTO-ENTMCNC: 33.1 G/DL (ref 31.4–37.4)
MCV RBC AUTO: 88 FL (ref 82–98)
MONOCYTES # BLD AUTO: 0.91 THOUSAND/ΜL (ref 0.17–1.22)
MONOCYTES NFR BLD AUTO: 7 % (ref 4–12)
NEUTROPHILS # BLD AUTO: 11.14 THOUSANDS/ΜL (ref 1.85–7.62)
NEUTS SEG NFR BLD AUTO: 83 % (ref 43–75)
NRBC BLD AUTO-RTO: 0 /100 WBCS
PLATELET # BLD AUTO: 248 THOUSANDS/UL (ref 149–390)
PMV BLD AUTO: 10.1 FL (ref 8.9–12.7)
RBC # BLD AUTO: 4.42 MILLION/UL (ref 3.81–5.12)
WBC # BLD AUTO: 13.35 THOUSAND/UL (ref 4.31–10.16)

## 2021-03-02 PROCEDURE — NC001 PR NO CHARGE: Performed by: INTERNAL MEDICINE

## 2021-03-02 PROCEDURE — 99217 PR OBSERVATION CARE DISCHARGE MANAGEMENT: CPT | Performed by: PHYSICIAN ASSISTANT

## 2021-03-02 PROCEDURE — 85025 COMPLETE CBC W/AUTO DIFF WBC: CPT | Performed by: NURSE PRACTITIONER

## 2021-03-02 RX ADMIN — ONDANSETRON 8 MG: 2 SOLUTION INTRAMUSCULAR; INTRAVENOUS at 00:58

## 2021-03-02 RX ADMIN — LORAZEPAM 0.5 MG: 2 INJECTION INTRAMUSCULAR; INTRAVENOUS at 08:43

## 2021-03-02 RX ADMIN — DEXTROSE AND SODIUM CHLORIDE 125 ML/HR: 5; .9 INJECTION, SOLUTION INTRAVENOUS at 04:08

## 2021-03-02 RX ADMIN — ONDANSETRON 8 MG: 2 SOLUTION INTRAMUSCULAR; INTRAVENOUS at 08:43

## 2021-03-02 RX ADMIN — ONDANSETRON 8 MG: 2 SOLUTION INTRAMUSCULAR; INTRAVENOUS at 13:56

## 2021-03-02 NOTE — ASSESSMENT & PLAN NOTE
· WBC 18 on admission; with nausea/vomiting/diarrhea but without other signs of infection  · Observed off antibiotics  · WBCs trending down but still slightly elevated at time of discharge

## 2021-03-02 NOTE — ASSESSMENT & PLAN NOTE
· PDMP reviewed, continue mirtazapine and Xanax  · However stopped xanax now dt inability to hold po intake down   · Transition to tid  Iv now per gi will increase to mg tid for now   · Pt reports the anxiety comes when she vomits not that the anxiety causes the vomiting  · Asked if she goes to therapy or psychiatry she reports no that her pcp manages her meds

## 2021-03-02 NOTE — ASSESSMENT & PLAN NOTE
· PDMP reviewed, continue mirtazapine and Xanax  · Follow up with PCP as an out-patient  Patient may benefit from better anxiety control

## 2021-03-02 NOTE — PROGRESS NOTES
Progress Note - Pacheco Gray 1995, 30 y o  female MRN: [id number]    Unit/Bed#: [facility] 315-01 Encounter: [id number]    Primary Care Provider: Vandana Sterling MD   Date and time admitted to hospital: 2/28/2021  4:08 AM         * Intractable cyclical vomiting with nausea  Assessment & Plan  · Known history of cyclical vomiting, developed intractable nausea/vomiting   Did not respond to IV fluids, Zofran, Zyprexa in ED  · Admit as observation for further treatment of intractable nausea and vomiting  · Will continue IV fluids for now, advance diet as tolerated however   · Alternate between p r n  Zofran and p r n  Phenergan for antiemetic, monitor QTC with serial EKG   · Obtain ekg in am   · - gi recommend marijuana cessation   · Pt reports about 10 episodes of vomiting over night into   · If patient should develop significant abdominal pain will /obtain CT/ abdomen/pelvis  · There is evidence of emesis in multiple basins   · Discussion had at length about marijuana use and cessation , pt states she uses only about three times per week   · She feels that she has researched this  Does not believe it is a contributor  She feels that it calms her down    She feels that her anxiety comes from the vomiting not that the anxiety causes her vomiting   · She is unable to hold anything down right now offered toast and crackers   · Pt reports bid xanax per pcp management   · transitioned to iv ativan tid right now      Marijuana use  Assessment & Plan  · Admits to marijuana use even prior to onset  · Counseled on cessation    Leukocytosis  Assessment & Plan  · WBC 18 on admission; with nausea/vomiting/diarrhea but without other signs of infection  · Observe off antibiotics for now, trend WBC and fever curve    Diarrhea  Assessment & Plan  · Reports onset of diarrhea with nausea/vomiting  · Supportive care, IVF as above    Anxiety  Assessment & Plan  · PDMP reviewed, continue mirtazapine and Xanax  · However stopped xanax now dt inability to hold po intake down   · Transition to tid  Iv now per gi will increase to mg tid for now   · Pt reports the anxiety comes when she vomits not that the anxiety causes the vomiting  · Asked if she goes to therapy or psychiatry she reports no that her pcp manages her meds  Prolonged QT interval syndrome  Assessment & Plan  · Reported history of prolonged QT, current EKG QTC 399ms  · Will monitor with serial EKGs, adjust antiemetic regimen if prolongation should develop      VTE Pharmacologic Prophylaxis:   Pharmacologic: ambulatory   Mechanical VTE Prophylaxis in Place: No    Patient Centered Rounds: I have performed bedside rounds with nursing staff today  Discussions with Specialists or Other Care Team Provider: nursing     Education and Discussions with Family / Patient: patient     Time Spent for Care: 45 minutes  More than 50% of total time spent on counseling and coordination of care as described above  Current Length of Stay: 0 day(s)    Current Patient Status: Observation   Certification Statement: The patient, admitted on an observation basis, will now require > 2 midnight hospital stay due to due to ongoing vomiting and poor po intake iv fluids     Discharge Plan: home when medically stable     Code Status: Level 1 - Full Code      Subjective:   Pt reports that she has no pain but rather just feels nauseated continues to vomit reports about  Times last night there is evidence of liquid emesis around room in various containers  No sob very anxious she reports the vomiting makes her feel that way     Objective:     Vitals:   Temp (24hrs), Av 1 °F (36 7 °C), Min:97 5 °F (36 4 °C), Max:98 6 °F (37 °C)    Temp:  [97 5 °F (36 4 °C)-98 6 °F (37 °C)] 98 2 °F (36 8 °C)  HR:  [64-83] 64  Resp:  [16-18] 18  BP: (109-144)/(64-81) 128/81  SpO2:  [97 %-100 %] 97 %  Body mass index is 25 4 kg/m²  Input and Output Summary (last 24 hours):        Intake/Output Summary (Last 24 hours) at 3/1/2021 2126  Last data filed at 3/1/2021 1914  Gross per 24 hour   Intake 4455 ml   Output 5 ml   Net 4450 ml       Physical Exam:     Physical Exam  Constitutional:       General: She is not in acute distress  Appearance: She is ill-appearing  She is not toxic-appearing or diaphoretic  HENT:      Head: Normocephalic and atraumatic  Right Ear: There is no impacted cerumen  Mouth/Throat:      Pharynx: Oropharynx is clear  Eyes:      Conjunctiva/sclera: Conjunctivae normal    Cardiovascular:      Rate and Rhythm: Normal rate  Heart sounds: Murmur present  No friction rub  No gallop  Pulmonary:      Effort: No respiratory distress  Breath sounds: No stridor  No wheezing, rhonchi or rales  Chest:      Chest wall: No tenderness  Abdominal:      General: There is no distension  Palpations: There is no mass  Tenderness: There is no abdominal tenderness  There is no right CVA tenderness, left CVA tenderness, guarding or rebound  Hernia: No hernia is present  Musculoskeletal:         General: No swelling, tenderness, deformity or signs of injury  Right lower leg: No edema  Left lower leg: No edema  Skin:     Coloration: Skin is not jaundiced or pale  Findings: No bruising, erythema, lesion or rash  Neurological:      Mental Status: She is alert and oriented to person, place, and time     Psychiatric:      Comments: Flat depressed appearing            Additional Data:     Labs:    Results from last 7 days   Lab Units 03/01/21  0632   WBC Thousand/uL 14 42*   HEMOGLOBIN g/dL 12 7   HEMATOCRIT % 38 2   PLATELETS Thousands/uL 267   NEUTROS PCT % 85*   LYMPHS PCT % 8*   MONOS PCT % 6   EOS PCT % 0     Results from last 7 days   Lab Units 03/01/21  0632   SODIUM mmol/L 141   POTASSIUM mmol/L 3 4*   CHLORIDE mmol/L 109*   CO2 mmol/L 24   BUN mg/dL 10   CREATININE mg/dL 0 75   ANION GAP mmol/L 8   CALCIUM mg/dL 9 1   ALBUMIN g/dL 4 4   TOTAL BILIRUBIN mg/dL 2 36*   ALK PHOS U/L 71   ALT U/L 28   AST U/L 15   GLUCOSE RANDOM mg/dL 107                           * I Have Reviewed All Lab Data Listed Above  * Additional Pertinent Lab Tests Reviewed: All Labs Within Last 24 Hours Reviewed    Imaging:    Imaging Reports Reviewed Today Include: reviewed    Recent Cultures (last 7 days):           Last 24 Hours Medication List:   Current Facility-Administered Medications   Medication Dose Route Frequency Provider Last Rate    dextrose 5 % and sodium chloride 0 9 %  125 mL/hr Intravenous Continuous Meg Robbins  mL/hr (03/01/21 1917)    LORazepam  0 5 mg Intravenous TID Meg Robbins MD      mirtazapine  22 5 mg Oral HS Savanna Men, DO      norethindrone  1 tablet Oral Daily Savanna Holman, DO      ondansetron  8 mg Intravenous Q6H Meg Robbins MD 8 mg (03/01/21 1914)    promethazine  25 mg Intramuscular Q6H PRN Savanna Holman DO          Today, Patient Was Seen By: MARGOT Garrett    ** Please Note: Dictation voice to text software may have been used in the creation of this document   **

## 2021-03-02 NOTE — ASSESSMENT & PLAN NOTE
· Known history of cyclical vomiting, developed intractable nausea/vomiting   Did not respond to IV fluids, Zofran, Zyprexa in ED  · Admit as observation for further treatment of intractable nausea and vomiting  · Will continue IV fluids for now, advance diet as tolerated however   · Alternate between p r n  Zofran and p r n  Phenergan for antiemetic, monitor QTC with serial EKG   · Obtain ekg in am   · - gi recommend marijuana cessation   · Pt reports about 10 episodes of vomiting over night into   · If patient should develop significant abdominal pain will /obtain CT/ abdomen/pelvis  · There is evidence of emesis in multiple basins   · Discussion had at length about marijuana use and cessation , pt states she uses only about three times per week   · She feels that she has researched this  Does not believe it is a contributor  She feels that it calms her down    She feels that her anxiety comes from the vomiting not that the anxiety causes her vomiting   · She is unable to hold anything down right now offered toast and crackers   · Pt reports bid xanax per pcp management   · transitioned to iv ativan tid right now

## 2021-03-02 NOTE — DISCHARGE SUMMARY
Discharge- Sherly Monteiro 1995, 30 y o  female MRN: [id number]  Unit/Bed#: [facility] 315-01 Encounter: [id number]  Primary Care Provider: Delores Espinal MD   Date and time admitted to hospital: 2/28/2021  4:08 AM    * Intractable cyclical vomiting with nausea  Assessment & Plan  · Known history of cyclical vomiting syndrome vs cannabinoid hyperemesis syndrome  · Monitoring on IVFS  · Seen by GI  Recommend discontinuation of marijuana use  PRN hot showers  · Tolerating oral intake  Still with small amount of vomitus at times but improved  · Patient wishes to be discharged, stating being here is exacerbating her anxiety, which increases her nausea and vomiting  Marijuana use  Assessment & Plan  · Admits to marijuana use  · Counseled on cessation    Leukocytosis  Assessment & Plan  · WBC 18 on admission; with nausea/vomiting/diarrhea but without other signs of infection  · Observed off antibiotics  · WBCs trending down but still slightly elevated at time of discharge  Anxiety  Assessment & Plan  · PDMP reviewed, continue mirtazapine and Xanax  · Follow up with PCP as an out-patient  Patient may benefit from better anxiety control      Prolonged QT interval syndrome  Assessment & Plan  · Reported history of prolonged QT, current EKG QTC 399ms    Hypokalemia-resolved as of 3/2/2021  Assessment & Plan  · Repleted potassium iv meq   · Added iv magnesium        Discharging Physician / Practitioner: London Coelho PA-C  PCP: Delores Espinal MD  Admission Date:   Admission Orders (From admission, onward)     Ordered        02/28/21 0716  Place in Observation  Once                   Discharge Date: 03/02/21    Resolved Problems  Date Reviewed: 3/2/2021          Resolved    Diarrhea 3/2/2021     Resolved by  London Coelho PA-C    Hypokalemia 3/2/2021     Resolved by  London Coelho PA-C          Consultations During Hospital Stay:  · GI    Procedures Performed:   · None    Significant Findings / Test Results: · None    Incidental Findings:   · None     Test Results Pending at Discharge (will require follow up): · None     Outpatient Tests Requested:  · None    Complications:  None    Reason for Admission: Dehydration secondary to cyclical vomiting syndrome    Hospital Course:     Sherly Monteiro is a 30 y o  female patient who originally presented to the hospital on 2/28/2021 due to intractable nausea and vomiting secondary to cyclical vomiting syndrome versus cannabis hyperemesis syndrome  She was seen in consultation by GI  She was treated conservatively with IV fluids and antiemetics  She reports she has these symptoms in the past which just have to spontaneously resolve on their own  Her only concern was dehydration upon admission  She is now requesting to be discharged stating her anxiety is heightened being here in the hospital and feels better from a dehydration standpoint  She continues to feel nauseous and is tolerating small amounts of oral intake  She was seen by GI who recommends no further intervention at this time    Please see above list of diagnoses and related plan for additional information  Condition at Discharge: stable     Discharge Day Visit / Exam:     Subjective: On the day of discharge, she is still nauseous with some occasional vomiting but is improving and wants to go home  Vitals: Blood Pressure: 105/62 (03/02/21 0758)  Pulse: 74 (03/02/21 0758)  Temperature: 98 2 °F (36 8 °C) (03/02/21 0758)  Temp Source: Oral (03/02/21 0758)  Respirations: 18 (03/02/21 0758)  Weight - Scale: 67 1 kg (148 lb) (02/28/21 0411)  SpO2: 97 % (03/02/21 0758)  Exam:   Physical Exam  Vitals signs and nursing note reviewed  Constitutional:       General: She is not in acute distress  Appearance: She is well-developed  HENT:      Head: Normocephalic and atraumatic  Eyes:      Conjunctiva/sclera: Conjunctivae normal    Neck:      Musculoskeletal: Neck supple     Cardiovascular:      Rate and Rhythm: Normal rate and regular rhythm  Heart sounds: No murmur  Pulmonary:      Effort: Pulmonary effort is normal  No respiratory distress  Breath sounds: Normal breath sounds  Abdominal:      Palpations: Abdomen is soft  Tenderness: There is no abdominal tenderness  Skin:     General: Skin is warm and dry  Neurological:      Mental Status: She is alert  Discussion with Family: None    Discharge instructions/Information to patient and family:   See after visit summary for information provided to patient and family  Provisions for Follow-Up Care:  See after visit summary for information related to follow-up care and any pertinent home health orders  Disposition:     Home    For Discharges to [facility] SNF:   · Not Applicable to this Patient - Not Applicable to this Patient    Planned Readmission: No     Discharge Statement:  I spent 45 minutes discharging the patient  This time was spent on the day of discharge  I had direct contact with the patient on the day of discharge  Greater than 50% of the total time was spent examining patient, answering all patient questions, arranging and discussing plan of care with patient as well as directly providing post-discharge instructions  Additional time then spent on discharge activities  Discharge Medications:  See after visit summary for reconciled discharge medications provided to patient and family        ** Please Note: This note has been constructed using a voice recognition system **

## 2021-03-02 NOTE — ASSESSMENT & PLAN NOTE
· Known history of cyclical vomiting syndrome vs cannabinoid hyperemesis syndrome  · Monitoring on IVFS  · Seen by GI  Recommend discontinuation of marijuana use  PRN hot showers  · Tolerating oral intake  Still with small amount of vomitus at times but improved  · Patient wishes to be discharged, stating being here is exacerbating her anxiety, which increases her nausea and vomiting

## 2021-03-02 NOTE — ASSESSMENT & PLAN NOTE
· WBC 18 on admission; with nausea/vomiting/diarrhea but without other signs of infection  · Observe off antibiotics for now, trend WBC and fever curve

## 2021-03-02 NOTE — PROGRESS NOTES
Gastroenterology Progress Note      PATIENT INFORMATION      Patient: Sushma David 30 y o  female   MRN: [id number]  PCP: Yosef Douglas MD  Unit/Bed#: [facility] 315-01 Encounter: [id number]  Date Of Visit: 21  Current Length of Stay: 0 day(s)     ASSESSMENTS & PLAN    Sushma David is a 30 y o  old female with PMH of cyclic vomiting syndrome, marijuana abuse, anxiety/depression who presented with intractable nausea/vomiting     Intractable nausea/vomiting - secondary to cyclic vomiting syndrome and/or cannabinoid hyperemesis syndrome  Patient state she had EGD 2-3 years ago that was normal   Gastric emptying study in 2017 was normal   · Continue Zofran 8 mg q 6 hours  QTC on admission was 399  Will continue to monitor daily  EKG pending  · Continue IV Ativan to 0 5 mg t i d   · Hot showers prn  · Counseled on marijuana cessation  · D5NS 125 cc/hr  · Consider amitriptyline at discharge or as outpt for [facility] prophylaxis in conjunction with her depression  · Counseled on avoiding triggers including stress/anxiety, hypoglycemia and sleep deprivation  · Keep room dark and quiet  · Will need control of anxiety/depression     Marijuana use - daily usage  Counseled in the past to stop but states it helps her symptoms  · Spoke to patient in length regarding marijuana cessation  She states she will consider it  SUBJECTIVE     Pt reports 30% improvement in symptoms since admission but still very nauseous with 10+ episodes of vomiting in past 12 hours  She states it will take time  Otherwise no acute complaints  OBJECTIVE     Vitals:   Temp (24hrs), Av 2 °F (36 8 °C), Min:98 2 °F (36 8 °C), Max:98 2 °F (36 8 °C)    Temp:  [98 2 °F (36 8 °C)] 98 2 °F (36 8 °C)  HR:  [64] 64  Resp:  [18] 18  BP: (128)/(81) 128/81  SpO2:  [97 %] 97 %  Body mass index is 25 4 kg/m²  Input and Output Summary (last 24 hours):        Intake/Output Summary (Last 24 hours) at 3/2/2021 1461  Last data filed at 3/2/2021 0408  Gross per 24 hour   Intake 2350 ml   Output --   Net 2350 ml       Physical Exam:   GENERAL: NAD  HEENT:  NC/AT, no scleral icterus  CARDIAC:  RRR, +S1/S2  PULMONARY:  non-labored breathing  ABDOMEN:  Soft, NT/ND, +BS, no rebound/guarding/rigidity  Extremities:  No edema, cyanosis, or clubbing  NEUROLOGIC:  Alert  SKIN:  No rashes or erythema      ADDITIONAL DATA     Labs & Recent Cultures:     Results from last 7 days   Lab Units 03/02/21  0512   WBC Thousand/uL 13 35*   HEMOGLOBIN g/dL 12 9   HEMATOCRIT % 39 0   PLATELETS Thousands/uL 248   NEUTROS PCT % 83*   LYMPHS PCT % 9*   MONOS PCT % 7   EOS PCT % 0     Results from last 7 days   Lab Units 03/01/21  9930   POTASSIUM mmol/L 3 4*   CHLORIDE mmol/L 109*   CO2 mmol/L 24   BUN mg/dL 10   CREATININE mg/dL 0 75   CALCIUM mg/dL 9 1   ALK PHOS U/L 71   ALT U/L 28   AST U/L 15                     Nutrition:  Diet Clear Liquid  Radiology Results:   No orders to display     Scheduled Medications:  LORazepam, 0 5 mg, TID  mirtazapine, 22 5 mg, HS  norethindrone, 1 tablet, Daily  ondansetron, 8 mg, Q6H        PRN MEDS:  promethazine, 25 mg, Q6H PRN        Last 24 Hours Medication List:   Current Facility-Administered Medications   Medication Dose Route Frequency Provider Last Rate    dextrose 5 % and sodium chloride 0 9 %  125 mL/hr Intravenous Continuous Meg Robbins  mL/hr (03/02/21 0408)    LORazepam  0 5 mg Intravenous TID Meg Robbins MD      mirtazapine  22 5 mg Oral HS Asuncion Ranjit, DO      norethindrone  1 tablet Oral Daily Asuncion Ranjit, DO      ondansetron  8 mg Intravenous Q6H Meg Robbins MD 8 mg (03/02/21 0058)    promethazine  25 mg Intramuscular Q6H PRN Asuncion Ranjit, DO            Time Spent for Care: 30 mins spent in total   More than 50% of total time spent on counseling and coordination of care as described above        Current Length of Stay: 0 day(s)      Code Status: Level 1 - Full Code Dinora Console Dinora Console Dinora Console    ** Please Note: This note is constructed using a voice recognition dictation system   **

## 2021-03-03 ENCOUNTER — TRANSITIONAL CARE MANAGEMENT (OUTPATIENT)
Dept: FAMILY MEDICINE CLINIC | Facility: CLINIC | Age: 26
End: 2021-03-03

## 2021-03-03 DIAGNOSIS — F41.9 ANXIETY: ICD-10-CM

## 2021-03-03 RX ORDER — ALPRAZOLAM 0.25 MG/1
0.25 TABLET ORAL 2 TIMES DAILY
Qty: 180 TABLET | Refills: 0 | Status: SHIPPED | OUTPATIENT
Start: 2021-03-03 | End: 2021-06-02 | Stop reason: SDUPTHER

## 2021-03-05 ENCOUNTER — TELEMEDICINE (OUTPATIENT)
Dept: FAMILY MEDICINE CLINIC | Facility: CLINIC | Age: 26
End: 2021-03-05
Payer: COMMERCIAL

## 2021-03-05 VITALS — TEMPERATURE: 98.6 F | BODY MASS INDEX: 25.4 KG/M2 | WEIGHT: 148 LBS

## 2021-03-05 DIAGNOSIS — R11.15 INTRACTABLE CYCLICAL VOMITING WITH NAUSEA: Primary | ICD-10-CM

## 2021-03-05 DIAGNOSIS — F41.9 ANXIETY: ICD-10-CM

## 2021-03-05 DIAGNOSIS — D72.829 LEUKOCYTOSIS, UNSPECIFIED TYPE: ICD-10-CM

## 2021-03-05 PROCEDURE — 1111F DSCHRG MED/CURRENT MED MERGE: CPT | Performed by: FAMILY MEDICINE

## 2021-03-05 PROCEDURE — 99496 TRANSJ CARE MGMT HIGH F2F 7D: CPT | Performed by: FAMILY MEDICINE

## 2021-03-05 RX ORDER — HYDROXYZINE HYDROCHLORIDE 10 MG/1
10 TABLET, FILM COATED ORAL EVERY 6 HOURS PRN
Qty: 30 TABLET | Refills: 2 | Status: SHIPPED | OUTPATIENT
Start: 2021-03-05 | End: 2021-10-01 | Stop reason: SDUPTHER

## 2021-03-05 NOTE — PROGRESS NOTES
Assessment/Plan:        Problem List Items Addressed This Visit        Digestive    Intractable cyclical vomiting with nausea - Primary     Had flare up and got dehydrated         Relevant Orders    Phosphorus    Comprehensive metabolic panel       Other    Anxiety     Still with anxiety, will try low dose hydroxyzine prn in addition to prn xanax         Relevant Medications    hydrOXYzine HCL (ATARAX) 10 mg tablet    Leukocytosis     Await follow up lab         Relevant Orders    CBC and differential             Reason for visit is follow from hospitalization    Encounter provider Danielle Bazzi MD       Provider located at [address] [place] [id number]      Recent Visits  No visits were found meeting these conditions  Showing recent visits within past 7 days and meeting all other requirements     Today's Visits  Date Type Provider Dept   03/05/21 Telemedicine Danielle Bazzi MD [facility]   Showing today's visits and meeting all other requirements     Future Appointments  No visits were found meeting these conditions  Showing future appointments within next 150 days and meeting all other requirements        After connecting through [employer], the patient was identified by name and date of birth  Joce Acosta was informed that this is a telemedicine visit and that the visit is being conducted through [employer] and patient was informed that this is not a secure, HIPAA-compliant platform  She agrees to proceed     My office door was closed  No one else was in the room  She acknowledged consent and understanding of privacy and security of the video platform  The patient has agreed to participate and understands they can discontinue the visit at any time  Patient is aware this is a billable service  Subjective:     Patient ID: Joce Acosta is a 30 y o  female      Hospital f/u for flare  Up of cyclical vomiting, white cells were up as well as calcium and tp/albumin, feels better after  Hydration, still naxious to go ou tin public with boyfriend or other friends since doesn't know when this will flare up      Review of Systems   Constitutional: Negative for activity change, appetite change, chills, fatigue and fever  Respiratory: Negative for cough and shortness of breath  Cardiovascular: Negative for chest pain  Gastrointestinal: Positive for nausea and vomiting  Negative for diarrhea  Neurological: Negative for dizziness and headaches  Psychiatric/Behavioral: The patient is nervous/anxious  Objective:    Vitals:    03/05/21 1349   Temp: 98 6 °F (37 °C)   TempSrc: Temporal   Weight: 67 1 kg (148 lb)       Physical Exam  Vitals signs reviewed  Constitutional:       Appearance: Normal appearance  HENT:      Nose: No rhinorrhea  Eyes:      General:         Right eye: No discharge  Left eye: No discharge  Pulmonary:      Effort: Pulmonary effort is normal    Lymphadenopathy:      Cervical: No cervical adenopathy  Neurological:      Mental Status: She is alert  Psychiatric:         Mood and Affect: Mood is anxious  Transitional Care Management Review:  Tara Rico is a 30 y o  female here for TCM follow up  During the TCM phone call patient stated:    TCM Call (since 2/2/2021)     Date and time call was made  3/3/2021  3:56 PM    Hospital care reviewed  Records reviewed    Patient was hospitialized at  [facility]        Date of Admission  02/28/21    Date of discharge  03/02/21    Diagnosis  Intractable vomiting, leukocytosis    Disposition  Home    Were the patients medications reviewed and updated  No    Current Symptoms  None      TCM Call (since 2/2/2021)     Post hospital issues  None    Should patient be enrolled in anticoag monitoring? No    Scheduled for follow up?   Yes    Did you obtain your prescribed medications  Yes    Do you need help managing your prescriptions or medications  No    Is transportation to your appointment needed  No    I have advised the patient to call PCP with any new or worsening symptoms  June Mendez, Practice Administrator           I spent 15 minutes with the patient during this visit      Delores Espinal MD

## 2021-05-03 ENCOUNTER — TELEPHONE (OUTPATIENT)
Dept: FAMILY MEDICINE CLINIC | Facility: CLINIC | Age: 26
End: 2021-05-03

## 2021-05-03 DIAGNOSIS — R11.15 CYCLICAL VOMITING WITH NAUSEA: ICD-10-CM

## 2021-05-03 RX ORDER — ACETAMINOPHEN AND CODEINE PHOSPHATE 120; 12 MG/5ML; MG/5ML
1 SOLUTION ORAL DAILY
Qty: 84 TABLET | Refills: 1 | Status: SHIPPED | OUTPATIENT
Start: 2021-05-03 | End: 2021-09-24

## 2021-05-03 NOTE — TELEPHONE ENCOUNTER
PATIENT CALLING TO REFILL HER BIRTH CONTROL PLEASE-STATES SHE HAS MOVED AND PHARMACY NEEDS TO [facility] CHANG[facility]  [place]  PATIENT NEEDS THEM TODAY PLEASE  ADVISED PATIENT THERE IS A 48-72 HR REFILL TIME ON ALL RX's  PATIENT STATES SHE ALWAYS CALLS THE DAY OF OR THE DAY BEFORE FOR HER REFILLS   INFORMED PATIENT I WILL PUT MESSAGE BACK HIGH PRIORITY, BUT THAT IT DOES NOT GUARANTEE THAT THEY WILL BE REFILLED TODAY

## 2021-05-19 DIAGNOSIS — F41.9 ANXIETY: ICD-10-CM

## 2021-05-19 RX ORDER — MIRTAZAPINE 15 MG/1
TABLET, FILM COATED ORAL
Qty: 135 TABLET | Refills: 1 | Status: SHIPPED | OUTPATIENT
Start: 2021-05-19 | End: 2021-06-02 | Stop reason: SDUPTHER

## 2021-05-21 NOTE — TELEPHONE ENCOUNTER
Paperwork completed 
Patient called in looking for completed disability paperwork please advise
Patient paperwork Is in [place] she will like to know if you can complete paperwork for Monday they will not start her disability until this paperwork is completed
Shane called in following up on disability form   Pt ins was advised that forms were faxed over this morning
[employer] disability forms faxed along w/ most recent ov notes and procedure notes to [employer] at [phone]  Pt was informed information was faxed 
[FreeTextEntry1] : Reason for consult: cervical cord abnormality\par \par HPI: STEVAN STODDARD is a 74 year old man \par \par Has been using a cane since knee replacement for long distance. \par Since spring 2020, noted dysequilibrium, mainly in the pool early on, then more dysequilibrium at all times, progressive over months, also associated with coldness/tingling in both feet that worsened over a similar timeframe. \par Saw Dr. Olvera, saw Dr. GOETZ. Saw Dr. Cruz and Dr. Wheeler for cervical myelopathy, got decompression in 1/2020. \par Coldness/tingling in the feet improved. Dysequilibrium also improved but was not doing very much, just walking around the house during covid.\par Now that he has started to go back to gym, dysequilibrium has recurred along with foot numbness, not on exertion, albeit to a lesser extent than prior to cervical surgery. Saw Dr. Wheeler, ordered for repeat MRI C spine and L spine which were stable, but notably MRI L spine showed severe central canal stenosis. \par Has BPH that causes urinary dysfunction, meds help. \par \par ROS/Current Sx:\par 10 point ROS reviewed and scanned.\par back pain\par \par PMHX:\par CAD\par BL CTS s/p R surgery in remote past\par cervical stenosis s/p decompression\par lumbar stenosis\par BPH, s/p prostate ablation.\par ED\par severe eczema\par \par MEDS:\par methenamine\par xarelto\par celebrex\par zetia\par proscar\par vesicare\par cyclosporine 100\par latanoprost\par D3\par vitC\par folate\par miralax\par citrocel\par tums\par cosamin\par ramipril\par methenamine\par rosuvastatin\par sulfatrim\par \par ALL: nkda\par \par SHx: former tob, former etoh that was heavy for only a few years in remote past (went to AA). no drugs.\par \par FHx: NC\par \par Vitals: unremarkable\par \par Exam:\par \par AO3.  Normally conversant.  Follows commands, names, and repeats.  Good attention.\par \par PERRL, VFF, EOMI, no nystagmus, face symmetric, TUP at midline.\par \par Motor: \par                                                 R:                               L:\par Del                                           5                                5\par Bi                                              5                               5\par Tri                                            5                               5\par Wrist Extensors                      5                               5\par Finger abductors                    5                               5\par                                         5                               5 \par \par HF                                           5                               5\par KE                                           5                               5\par KF                                           5                               5\par DF                                           5                               5\par PF                                           5                               5\par \par Tone                                       R                               L\par UE                                          0                                0 \par LE                                          0                                0\par \par Sensory                                RUE                      LUE                 RLE                LLE     \par LT                                           +                            +                      +                   +\par Vib                                          min                            min                     mod                  mod\par JPS                                         +                            +                      +                   +\par PP                                         +                            +                      +                   +\par Temp                                     +                            +                      +                   +\par \par Reflexes:\par                                              R                             L                            \par Biceps                                  2                             2\par BR                                        2                             2\par Triceps                              \par Pat                                        2                            2 \par AJ                                        abs                             abs\par \par TOES                                    F                            F\par \par \par Coordination:\par                                              R                             L                       \par FTN                                       0                             0 \par JENARO                                      0                            0\par HTS                                      \par \par Other                                                                          \par  \par Gait: bent forward at the back, cannot complete tandem, can stand on heels, trouble standing on toes on both sides. + romberg.\par \par                     Assistance: none\par \par \par AP: 75yo w/ foot numbness and imbalance that improved after cervical cord decompression in 1/2020 but has since recurred to a lesser degree. Exam notable for mildly decreased VBS in both feet, absent AJs, and +romberg. \par \par all questions answered, education provided, management discussed.\par \par - EMG to evaluate for neuropathy with Dr. Oliveira; if pt wants, can get EMG at different hospital\par - neuropathy labs, CK given c/o muscle weakness upon rising.\par - f/u with derm for eczema. will have to discuss whether cyclosporine could contribute to neuropathy, if EMG demonstrates this.\par - f/u with spine specialist for structural spine dz\par - RTC after testing.\par

## 2021-06-02 ENCOUNTER — TELEPHONE (OUTPATIENT)
Dept: FAMILY MEDICINE CLINIC | Facility: CLINIC | Age: 26
End: 2021-06-02

## 2021-06-02 DIAGNOSIS — F41.9 ANXIETY: ICD-10-CM

## 2021-06-02 RX ORDER — ALPRAZOLAM 0.25 MG/1
0.25 TABLET ORAL 2 TIMES DAILY
Qty: 180 TABLET | Refills: 0 | Status: SHIPPED | OUTPATIENT
Start: 2021-06-02 | End: 2021-09-01 | Stop reason: SDUPTHER

## 2021-06-02 RX ORDER — MIRTAZAPINE 15 MG/1
TABLET, FILM COATED ORAL
Qty: 135 TABLET | Refills: 1 | Status: SHIPPED | OUTPATIENT
Start: 2021-06-02 | End: 2021-06-27

## 2021-06-02 NOTE — TELEPHONE ENCOUNTER
PT STATES THAT HER MIRTAZAPINE WAS SENT TO THE WRONG PHARMACY  IT WENT TO [facility] [address] IN [place]  [facility] [place]  PLEASE SEND ASAP  PT ASKED TO BE CALLED WHEN DONE  THANK YOU

## 2021-06-02 NOTE — TELEPHONE ENCOUNTER
PATIENT CALLING AGAIN BECAUSE SHE ALSO NEEDS A REFILL ON HER XANAX   [facility] [place]

## 2021-06-16 ENCOUNTER — OFFICE VISIT (OUTPATIENT)
Dept: FAMILY MEDICINE CLINIC | Facility: CLINIC | Age: 26
End: 2021-06-16
Payer: COMMERCIAL

## 2021-06-16 VITALS
DIASTOLIC BLOOD PRESSURE: 72 MMHG | WEIGHT: 136 LBS | HEART RATE: 84 BPM | HEIGHT: 64 IN | RESPIRATION RATE: 16 BRPM | SYSTOLIC BLOOD PRESSURE: 124 MMHG | BODY MASS INDEX: 23.22 KG/M2

## 2021-06-16 DIAGNOSIS — R11.15 INTRACTABLE CYCLICAL VOMITING WITH NAUSEA: ICD-10-CM

## 2021-06-16 DIAGNOSIS — Z23 NEED FOR TETANUS, DIPHTHERIA, AND ACELLULAR PERTUSSIS (TDAP) VACCINE: Primary | ICD-10-CM

## 2021-06-16 PROCEDURE — 90715 TDAP VACCINE 7 YRS/> IM: CPT | Performed by: FAMILY MEDICINE

## 2021-06-16 PROCEDURE — 90471 IMMUNIZATION ADMIN: CPT | Performed by: FAMILY MEDICINE

## 2021-06-16 PROCEDURE — 99213 OFFICE O/P EST LOW 20 MIN: CPT | Performed by: FAMILY MEDICINE

## 2021-06-16 PROCEDURE — 1036F TOBACCO NON-USER: CPT | Performed by: FAMILY MEDICINE

## 2021-06-16 PROCEDURE — 3008F BODY MASS INDEX DOCD: CPT | Performed by: FAMILY MEDICINE

## 2021-06-16 RX ORDER — OMEPRAZOLE 20 MG/1
20 CAPSULE, DELAYED RELEASE ORAL DAILY
COMMUNITY
End: 2021-10-01 | Stop reason: SDUPTHER

## 2021-06-16 NOTE — PROGRESS NOTES
Assessment and Plan:    Problem List Items Addressed This Visit     None                 There are no diagnoses linked to this encounter  Subjective:      Patient ID: Merline Lima is a 27 y o  female  CC:    Chief Complaint   Patient presents with    Follow-up     Patient present today for a follow up after being seen at the [facility] due to [facility]  Patient ill also like to have some FMLA paperwork to be filled out  HPI:    Was hospitalized in Feb 2021 ansd seen in [place] 6/3-wants to have coverage for FMLa for future episodes -every couple for 3-4 days at a time, feels well now      The following portions of the patient's history were reviewed and updated as appropriate: allergies, current medications, past family history, past medical history, past social history, past surgical history and problem list       Review of Systems   Constitutional: Negative for activity change, appetite change and fatigue  Respiratory: Negative for shortness of breath  Cardiovascular: Negative for chest pain  Gastrointestinal: Positive for nausea and vomiting  Genitourinary: Positive for pelvic pain  Neurological: Negative for dizziness and headaches  Psychiatric/Behavioral: The patient is not nervous/anxious  Data to review:       Objective:    Vitals:    06/16/21 1403   BP: 124/72   BP Location: Left arm   Patient Position: Sitting   Cuff Size: Large   Pulse: 84   Resp: 16   Weight: 61 7 kg (136 lb)   Height: 5' 4 06" (1 627 m)        Physical Exam  Vitals reviewed  Constitutional:       Appearance: Normal appearance  Cardiovascular:      Rate and Rhythm: Normal rate and regular rhythm  Pulses: Normal pulses  Heart sounds: Normal heart sounds  Pulmonary:      Effort: Pulmonary effort is normal       Breath sounds: Normal breath sounds  Abdominal:      General: Abdomen is flat  Bowel sounds are normal       Palpations: Abdomen is soft     Lymphadenopathy: Cervical: No cervical adenopathy  Neurological:      Mental Status: She is alert     Psychiatric:         Mood and Affect: Mood normal

## 2021-08-18 ENCOUNTER — TELEPHONE (OUTPATIENT)
Dept: FAMILY MEDICINE CLINIC | Facility: CLINIC | Age: 26
End: 2021-08-18

## 2021-08-18 NOTE — TELEPHONE ENCOUNTER
Pt called, stating that her anxiety has been really bad lately  It's to the point where she doesn't really have an appetite  She is taking Xanax for this but it doesn't seem to be helping  I did schedule pt for 9/27/21 at 4:15 with Dr Jose Florentino; that was the first available thirty minute appointment    is it possible to have her be seen sooner, or should we stick with the 9/27 appointment? Please call pt at [phone] to let her know what will happen  Thank you!

## 2021-09-01 DIAGNOSIS — F41.9 ANXIETY: ICD-10-CM

## 2021-09-02 RX ORDER — ALPRAZOLAM 0.25 MG/1
0.25 TABLET ORAL 2 TIMES DAILY
Qty: 40 TABLET | Refills: 0 | Status: SHIPPED | OUTPATIENT
Start: 2021-09-02 | End: 2021-09-22 | Stop reason: SDUPTHER

## 2021-09-21 DIAGNOSIS — F41.9 ANXIETY: ICD-10-CM

## 2021-09-21 RX ORDER — ALPRAZOLAM 0.25 MG/1
0.25 TABLET ORAL 2 TIMES DAILY
Qty: 40 TABLET | Refills: 0 | OUTPATIENT
Start: 2021-09-21 | End: 2021-10-11

## 2021-09-22 DIAGNOSIS — F41.9 ANXIETY: ICD-10-CM

## 2021-09-22 RX ORDER — ALPRAZOLAM 0.25 MG/1
0.25 TABLET ORAL 2 TIMES DAILY
Qty: 60 TABLET | Refills: 0 | Status: SHIPPED | OUTPATIENT
Start: 2021-09-22 | End: 2021-10-25

## 2021-09-23 DIAGNOSIS — R11.15 CYCLICAL VOMITING WITH NAUSEA: ICD-10-CM

## 2021-09-24 RX ORDER — ACETAMINOPHEN AND CODEINE PHOSPHATE 120; 12 MG/5ML; MG/5ML
SOLUTION ORAL
Qty: 84 TABLET | Refills: 1 | Status: SHIPPED | OUTPATIENT
Start: 2021-09-24 | End: 2021-10-01 | Stop reason: SDUPTHER

## 2021-09-26 DIAGNOSIS — F41.9 ANXIETY: ICD-10-CM

## 2021-09-27 RX ORDER — MIRTAZAPINE 15 MG/1
TABLET, FILM COATED ORAL
Qty: 135 TABLET | Refills: 0 | Status: SHIPPED | OUTPATIENT
Start: 2021-09-27 | End: 2021-10-01 | Stop reason: SDUPTHER

## 2021-10-01 ENCOUNTER — OFFICE VISIT (OUTPATIENT)
Dept: FAMILY MEDICINE CLINIC | Facility: CLINIC | Age: 26
End: 2021-10-01
Payer: COMMERCIAL

## 2021-10-01 VITALS
SYSTOLIC BLOOD PRESSURE: 118 MMHG | RESPIRATION RATE: 14 BRPM | BODY MASS INDEX: 25.57 KG/M2 | DIASTOLIC BLOOD PRESSURE: 70 MMHG | HEART RATE: 92 BPM | HEIGHT: 64 IN | TEMPERATURE: 98.1 F | WEIGHT: 149.8 LBS

## 2021-10-01 DIAGNOSIS — F41.9 ANXIETY: ICD-10-CM

## 2021-10-01 DIAGNOSIS — D72.829 LEUKOCYTOSIS, UNSPECIFIED TYPE: ICD-10-CM

## 2021-10-01 DIAGNOSIS — R11.15 CYCLICAL VOMITING WITH NAUSEA: ICD-10-CM

## 2021-10-01 PROCEDURE — 3008F BODY MASS INDEX DOCD: CPT | Performed by: FAMILY MEDICINE

## 2021-10-01 PROCEDURE — 1036F TOBACCO NON-USER: CPT | Performed by: FAMILY MEDICINE

## 2021-10-01 PROCEDURE — 3725F SCREEN DEPRESSION PERFORMED: CPT | Performed by: FAMILY MEDICINE

## 2021-10-01 PROCEDURE — 80307 DRUG TEST PRSMV CHEM ANLYZR: CPT | Performed by: FAMILY MEDICINE

## 2021-10-01 PROCEDURE — 99213 OFFICE O/P EST LOW 20 MIN: CPT | Performed by: FAMILY MEDICINE

## 2021-10-01 RX ORDER — OMEPRAZOLE 20 MG/1
20 CAPSULE, DELAYED RELEASE ORAL DAILY
Qty: 90 CAPSULE | Refills: 1 | Status: SHIPPED | OUTPATIENT
Start: 2021-10-01

## 2021-10-01 RX ORDER — HYDROXYZINE HYDROCHLORIDE 10 MG/1
10 TABLET, FILM COATED ORAL EVERY 6 HOURS PRN
Qty: 90 TABLET | Refills: 1 | Status: SHIPPED | OUTPATIENT
Start: 2021-10-01 | End: 2021-11-22

## 2021-10-01 RX ORDER — MIRTAZAPINE 15 MG/1
22.5 TABLET, FILM COATED ORAL
Qty: 135 TABLET | Refills: 1 | Status: SHIPPED | OUTPATIENT
Start: 2021-10-01 | End: 2022-06-09

## 2021-10-01 RX ORDER — ALPRAZOLAM 0.25 MG/1
0.25 TABLET ORAL 2 TIMES DAILY
Qty: 60 TABLET | Refills: 0 | Status: CANCELLED | OUTPATIENT
Start: 2021-10-01 | End: 2021-10-31

## 2021-10-01 RX ORDER — ACETAMINOPHEN AND CODEINE PHOSPHATE 120; 12 MG/5ML; MG/5ML
1 SOLUTION ORAL DAILY
Qty: 84 TABLET | Refills: 1 | Status: SHIPPED | OUTPATIENT
Start: 2021-10-01 | End: 2022-02-14 | Stop reason: SDUPTHER

## 2021-10-13 DIAGNOSIS — F41.9 ANXIETY: Primary | ICD-10-CM

## 2021-10-13 LAB
AMPHETAMINES UR QL SCN: NEGATIVE NG/ML
BARBITURATES UR QL SCN: NEGATIVE NG/ML
BENZODIAZ UR QL: NEGATIVE
BZE UR QL: NEGATIVE NG/ML
CANNABINOIDS UR QL SCN: POSITIVE
METHADONE UR QL SCN: NEGATIVE NG/ML
OPIATES UR QL: NEGATIVE NG/ML
PCP UR QL: NEGATIVE NG/ML
PROPOXYPH UR QL SCN: NEGATIVE NG/ML

## 2021-10-25 ENCOUNTER — TELEPHONE (OUTPATIENT)
Dept: FAMILY MEDICINE CLINIC | Facility: CLINIC | Age: 26
End: 2021-10-25

## 2021-11-04 LAB — ALPRAZ SERPL-MCNC: 4 NG/ML (ref 10–50)

## 2021-11-19 ENCOUNTER — TELEPHONE (OUTPATIENT)
Dept: FAMILY MEDICINE CLINIC | Facility: CLINIC | Age: 26
End: 2021-11-19

## 2021-11-20 DIAGNOSIS — F41.9 ANXIETY: ICD-10-CM

## 2021-11-22 RX ORDER — HYDROXYZINE HYDROCHLORIDE 10 MG/1
TABLET, FILM COATED ORAL
Qty: 90 TABLET | Refills: 1 | Status: SHIPPED | OUTPATIENT
Start: 2021-11-22 | End: 2022-01-13 | Stop reason: SDUPTHER

## 2022-01-13 DIAGNOSIS — F41.9 ANXIETY: ICD-10-CM

## 2022-01-13 RX ORDER — HYDROXYZINE HYDROCHLORIDE 10 MG/1
10 TABLET, FILM COATED ORAL EVERY 6 HOURS PRN
Qty: 90 TABLET | Refills: 1 | Status: SHIPPED | OUTPATIENT
Start: 2022-01-13

## 2022-01-30 DIAGNOSIS — F41.9 ANXIETY: ICD-10-CM

## 2022-01-31 RX ORDER — ALPRAZOLAM 0.25 MG/1
TABLET ORAL
Qty: 60 TABLET | Refills: 2 | Status: SHIPPED | OUTPATIENT
Start: 2022-01-31 | End: 2022-02-25 | Stop reason: SDUPTHER

## 2022-02-10 ENCOUNTER — OFFICE VISIT (OUTPATIENT)
Dept: FAMILY MEDICINE CLINIC | Facility: CLINIC | Age: 27
End: 2022-02-10
Payer: COMMERCIAL

## 2022-02-10 ENCOUNTER — APPOINTMENT (OUTPATIENT)
Dept: LAB | Facility: CLINIC | Age: 27
End: 2022-02-10
Payer: COMMERCIAL

## 2022-02-10 VITALS
DIASTOLIC BLOOD PRESSURE: 58 MMHG | HEIGHT: 64 IN | OXYGEN SATURATION: 97 % | SYSTOLIC BLOOD PRESSURE: 108 MMHG | WEIGHT: 154 LBS | BODY MASS INDEX: 26.29 KG/M2 | HEART RATE: 94 BPM

## 2022-02-10 DIAGNOSIS — R11.15 INTRACTABLE CYCLICAL VOMITING WITH NAUSEA: Primary | ICD-10-CM

## 2022-02-10 DIAGNOSIS — F41.9 ANXIETY: ICD-10-CM

## 2022-02-10 DIAGNOSIS — R51.9 ACUTE NONINTRACTABLE HEADACHE, UNSPECIFIED HEADACHE TYPE: ICD-10-CM

## 2022-02-10 DIAGNOSIS — R11.15 INTRACTABLE CYCLICAL VOMITING WITH NAUSEA: ICD-10-CM

## 2022-02-10 DIAGNOSIS — D72.829 LEUKOCYTOSIS, UNSPECIFIED TYPE: ICD-10-CM

## 2022-02-10 LAB
ALBUMIN SERPL BCP-MCNC: 4.3 G/DL (ref 3.5–5)
ALP SERPL-CCNC: 73 U/L (ref 46–116)
ALT SERPL W P-5'-P-CCNC: 35 U/L (ref 12–78)
ANION GAP SERPL CALCULATED.3IONS-SCNC: 4 MMOL/L (ref 4–13)
AST SERPL W P-5'-P-CCNC: 17 U/L (ref 5–45)
BASOPHILS # BLD AUTO: 0.04 THOUSANDS/ΜL (ref 0–0.1)
BASOPHILS NFR BLD AUTO: 1 % (ref 0–1)
BILIRUB SERPL-MCNC: 1.76 MG/DL (ref 0.2–1)
BUN SERPL-MCNC: 11 MG/DL (ref 5–25)
CALCIUM SERPL-MCNC: 9.9 MG/DL (ref 8.3–10.1)
CHLORIDE SERPL-SCNC: 106 MMOL/L (ref 100–108)
CO2 SERPL-SCNC: 27 MMOL/L (ref 21–32)
CREAT SERPL-MCNC: 0.79 MG/DL (ref 0.6–1.3)
EOSINOPHIL # BLD AUTO: 0.07 THOUSAND/ΜL (ref 0–0.61)
EOSINOPHIL NFR BLD AUTO: 1 % (ref 0–6)
ERYTHROCYTE [DISTWIDTH] IN BLOOD BY AUTOMATED COUNT: 12.9 % (ref 11.6–15.1)
GFR SERPL CREATININE-BSD FRML MDRD: 102 ML/MIN/1.73SQ M
GLUCOSE SERPL-MCNC: 91 MG/DL (ref 65–140)
HCT VFR BLD AUTO: 42.3 % (ref 34.8–46.1)
HGB BLD-MCNC: 14.1 G/DL (ref 11.5–15.4)
IMM GRANULOCYTES # BLD AUTO: 0.02 THOUSAND/UL (ref 0–0.2)
IMM GRANULOCYTES NFR BLD AUTO: 0 % (ref 0–2)
LYMPHOCYTES # BLD AUTO: 1.2 THOUSANDS/ΜL (ref 0.6–4.47)
LYMPHOCYTES NFR BLD AUTO: 20 % (ref 14–44)
MCH RBC QN AUTO: 29.8 PG (ref 26.8–34.3)
MCHC RBC AUTO-ENTMCNC: 33.3 G/DL (ref 31.4–37.4)
MCV RBC AUTO: 89 FL (ref 82–98)
MONOCYTES # BLD AUTO: 0.45 THOUSAND/ΜL (ref 0.17–1.22)
MONOCYTES NFR BLD AUTO: 7 % (ref 4–12)
NEUTROPHILS # BLD AUTO: 4.31 THOUSANDS/ΜL (ref 1.85–7.62)
NEUTS SEG NFR BLD AUTO: 71 % (ref 43–75)
NRBC BLD AUTO-RTO: 0 /100 WBCS
PHOSPHATE SERPL-MCNC: 2.7 MG/DL (ref 2.7–4.5)
PLATELET # BLD AUTO: 317 THOUSANDS/UL (ref 149–390)
PMV BLD AUTO: 9.4 FL (ref 8.9–12.7)
POTASSIUM SERPL-SCNC: 4.4 MMOL/L (ref 3.5–5.3)
PROT SERPL-MCNC: 7.5 G/DL (ref 6.4–8.2)
RBC # BLD AUTO: 4.73 MILLION/UL (ref 3.81–5.12)
SODIUM SERPL-SCNC: 137 MMOL/L (ref 136–145)
WBC # BLD AUTO: 6.09 THOUSAND/UL (ref 4.31–10.16)

## 2022-02-10 PROCEDURE — 85025 COMPLETE CBC W/AUTO DIFF WBC: CPT

## 2022-02-10 PROCEDURE — 80053 COMPREHEN METABOLIC PANEL: CPT

## 2022-02-10 PROCEDURE — 84100 ASSAY OF PHOSPHORUS: CPT

## 2022-02-10 PROCEDURE — 36415 COLL VENOUS BLD VENIPUNCTURE: CPT

## 2022-02-10 PROCEDURE — 1036F TOBACCO NON-USER: CPT | Performed by: FAMILY MEDICINE

## 2022-02-10 PROCEDURE — 3725F SCREEN DEPRESSION PERFORMED: CPT | Performed by: FAMILY MEDICINE

## 2022-02-10 PROCEDURE — 99213 OFFICE O/P EST LOW 20 MIN: CPT | Performed by: FAMILY MEDICINE

## 2022-02-10 PROCEDURE — 3008F BODY MASS INDEX DOCD: CPT | Performed by: FAMILY MEDICINE

## 2022-02-10 RX ORDER — RIZATRIPTAN BENZOATE 10 MG/1
10 TABLET, ORALLY DISINTEGRATING ORAL ONCE AS NEEDED
Qty: 9 TABLET | Refills: 2 | Status: SHIPPED | OUTPATIENT
Start: 2022-02-10 | End: 2022-07-07 | Stop reason: SDUPTHER

## 2022-02-10 NOTE — PROGRESS NOTES
Assessment and Plan:    Problem List Items Addressed This Visit     None                 Diagnoses and all orders for this visit:    Intractable cyclical vomiting with nausea    Anxiety              Subjective:      Patient ID: Angeles Wadsworth is a 27 y o  female  CC:    Chief Complaint   Patient presents with    Follow-up     Patient present today for her 4 month follow up  Patient will like to know if she can get refills for all of her meds instead of having to call them in  HPI:    Having temporal headaches on an doff since had covid , no other sx, eye exam utd, n/v  Is better, cycles are doing well, more light sensitive, in front of computer screen at home for work, fh migraines, needs refill xanax      The following portions of the patient's history were reviewed and updated as appropriate: allergies, current medications, past family history, past medical history, past social history, past surgical history and problem list       Review of Systems   Constitutional: Negative for activity change, appetite change and fatigue  Respiratory: Negative for shortness of breath  Cardiovascular: Negative for chest pain  Gastrointestinal: Positive for nausea  Negative for vomiting  Neurological: Negative for dizziness and headaches  Psychiatric/Behavioral: The patient is nervous/anxious  Data to review:       Objective:    Vitals:    02/10/22 1057   BP: 108/58   BP Location: Right arm   Patient Position: Sitting   Cuff Size: Large   Pulse: 94   SpO2: 97%   Weight: 69 9 kg (154 lb)   Height: 5' 4 06" (1 627 m)        Physical Exam  Vitals reviewed  Constitutional:       Appearance: Normal appearance  Cardiovascular:      Rate and Rhythm: Normal rate and regular rhythm  Pulses: Normal pulses  Heart sounds: Normal heart sounds  Pulmonary:      Effort: Pulmonary effort is normal       Breath sounds: Normal breath sounds  Abdominal:      General: Abdomen is flat   Bowel sounds are normal       Palpations: Abdomen is soft  Lymphadenopathy:      Cervical: No cervical adenopathy  Neurological:      Mental Status: She is alert     Psychiatric:         Mood and Affect: Mood normal

## 2022-02-14 DIAGNOSIS — R11.15 CYCLICAL VOMITING WITH NAUSEA: ICD-10-CM

## 2022-02-14 RX ORDER — ACETAMINOPHEN AND CODEINE PHOSPHATE 120; 12 MG/5ML; MG/5ML
1 SOLUTION ORAL DAILY
Qty: 84 TABLET | Refills: 1 | Status: SHIPPED | OUTPATIENT
Start: 2022-02-14 | End: 2022-03-22

## 2022-02-25 DIAGNOSIS — F41.9 ANXIETY: ICD-10-CM

## 2022-02-25 RX ORDER — ALPRAZOLAM 0.25 MG/1
0.25 TABLET ORAL 2 TIMES DAILY PRN
Qty: 60 TABLET | Refills: 1 | Status: SHIPPED | OUTPATIENT
Start: 2022-02-25 | End: 2022-05-03 | Stop reason: SDUPTHER

## 2022-03-22 ENCOUNTER — OFFICE VISIT (OUTPATIENT)
Dept: OBGYN CLINIC | Facility: CLINIC | Age: 27
End: 2022-03-22
Payer: COMMERCIAL

## 2022-03-22 VITALS
SYSTOLIC BLOOD PRESSURE: 122 MMHG | BODY MASS INDEX: 28.24 KG/M2 | DIASTOLIC BLOOD PRESSURE: 70 MMHG | HEIGHT: 64 IN | WEIGHT: 165.4 LBS

## 2022-03-22 DIAGNOSIS — Z01.419 ENCOUNTER FOR GYNECOLOGICAL EXAMINATION WITHOUT ABNORMAL FINDING: Primary | ICD-10-CM

## 2022-03-22 DIAGNOSIS — Z30.41 ORAL CONTRACEPTIVE PILL SURVEILLANCE: ICD-10-CM

## 2022-03-22 DIAGNOSIS — Z12.4 ENCOUNTER FOR PAPANICOLAOU SMEAR FOR CERVICAL CANCER SCREENING: ICD-10-CM

## 2022-03-22 PROCEDURE — 3008F BODY MASS INDEX DOCD: CPT | Performed by: NURSE PRACTITIONER

## 2022-03-22 PROCEDURE — G0145 SCR C/V CYTO,THINLAYER,RESCR: HCPCS | Performed by: NURSE PRACTITIONER

## 2022-03-22 PROCEDURE — 99385 PREV VISIT NEW AGE 18-39: CPT | Performed by: NURSE PRACTITIONER

## 2022-03-22 PROCEDURE — 1036F TOBACCO NON-USER: CPT | Performed by: NURSE PRACTITIONER

## 2022-03-22 RX ORDER — NORETHINDRONE ACETATE/ETHINYL ESTRADIOL AND FERROUS FUMARATE 1MG-20(24)
1 KIT ORAL DAILY
Qty: 84 TABLET | Refills: 1 | Status: SHIPPED | OUTPATIENT
Start: 2022-03-22 | End: 2022-06-30 | Stop reason: SDUPTHER

## 2022-03-22 NOTE — PROGRESS NOTES
Assessment / Plan    1  Encounter for gynecological examination without abnormal finding  Normal well woman exam  Pap smear updated    2  Encounter for Papanicolaou smear for cervical cancer screening    - Liquid-based pap, screening    3  Oral contraceptive pill surveillance  Changed from POP to [address], as she is an appropriate candidate and her dysmenorrhea and heavy periods are poorly managed on POP  RV 4 mo to assess efficacy  - norethindrone-ethinyl estradiol-ferrous fumarate (Liana 24 FE) 1-20 MG-MCG(24) per tablet; Take 1 tablet by mouth daily  Dispense: 84 tablet; Refill: 1        Subjective      Venango Daily is a 27 y o  female who presents for her annual gynecologic exam   NEW PATIENT    Currently using progesterone only pill for [facility] and menstrual management  She does not recall why she was changed from a regular pill to POP  She has recently developed migraines, however they are without aura  Her BP is normal and she is a non-tobacco user  Last pap: due for update; denies h/o abnormal   STD screening: declines; denies h/o STDs  Current partner, monogamous, x 2 years, declines std screening  Gardasil vaccine: no    Periods are irregular  Current contraception: oral progesterone-only contraceptive  History of abnormal Pap smear: no  Family history of breast,uterine, ovarian or colon cancer: no    Menstrual History:  OB History        0    Para   0    Term   0       0    AB   0    Living   0       SAB   0    IAB   0    Ectopic   0    Multiple   0    Live Births   0           Obstetric Comments   Menarche 12  Irregular, on OCP              Patient's last menstrual period was 03/15/2022  Period Cycle (Days):  (irregular, on POP)  Period Duration (Days): 5  Period Pattern: (!) Irregular  Menstrual Flow:  (light, heavy)  Menstrual Control: Tampon,Thin pad  Menstrual Control Change Freq (Hours):  2  Dysmenorrhea: (!) Severe  Dysmenorrhea Symptoms: Cramping (ibuprofen, heating pad)    The following portions of the patient's history were reviewed and updated as appropriate: allergies, current medications, past family history, past medical history, past social history, past surgical history and problem list     Review of Systems      Review of Systems   Constitutional: Negative for chills and fever  Respiratory: Negative for cough and shortness of breath  Gastrointestinal: Negative for abdominal distention, abdominal pain, blood in stool, constipation, diarrhea, nausea and vomiting  Genitourinary: Positive for menstrual problem (irregular, painful)  Negative for difficulty urinating, dysuria, frequency, genital sores, hematuria, pelvic pain, urgency, vaginal bleeding and vaginal discharge  Musculoskeletal: Negative for arthralgias and myalgias  Breasts:  Negative for skin changes, dimpling, asymmetry, nipple discharge, redness, tenderness or palpable masses    Objective      /70 (BP Location: Left arm, Patient Position: Sitting, Cuff Size: Standard)   Ht 5' 4" (1 626 m)   Wt 75 kg (165 lb 6 4 oz)   LMP 03/15/2022   BMI 28 39 kg/m²   Physical Exam  Constitutional:       General: She is not in acute distress  Appearance: Normal appearance  She is well-developed  She is not ill-appearing or diaphoretic  Comments: bmi 28 4   HENT:      Head: Normocephalic and atraumatic  Eyes:      Pupils: Pupils are equal, round, and reactive to light  Neck:      Thyroid: No thyromegaly  Pulmonary:      Effort: Pulmonary effort is normal    Chest:   Breasts: Breasts are symmetrical       Right: No inverted nipple, mass, nipple discharge, skin change, tenderness or supraclavicular adenopathy  Left: No inverted nipple, mass, nipple discharge, skin change, tenderness or supraclavicular adenopathy  Abdominal:      General: There is no distension  Palpations: Abdomen is soft  There is no mass  Tenderness: There is no abdominal tenderness   There is no guarding or rebound  Genitourinary:     General: Normal vulva  Exam position: Lithotomy position  Labia:         Right: No rash, tenderness, lesion or injury  Left: No rash, tenderness, lesion or injury  Vagina: No signs of injury and foreign body  No vaginal discharge, erythema, tenderness or bleeding  Cervix: No cervical motion tenderness, discharge or friability  Uterus: Not enlarged and not tender  Adnexa:         Right: No mass or tenderness  Left: No mass or tenderness  Musculoskeletal:      Cervical back: Neck supple  Lymphadenopathy:      Cervical: No cervical adenopathy  Upper Body:      Right upper body: No supraclavicular adenopathy  Left upper body: No supraclavicular adenopathy  Skin:     General: Skin is warm and dry  Neurological:      General: No focal deficit present  Mental Status: She is alert and oriented to person, place, and time  Psychiatric:         Mood and Affect: Mood normal          Behavior: Behavior normal          Thought Content:  Thought content normal          Judgment: Judgment normal

## 2022-03-30 LAB
LAB AP GYN PRIMARY INTERPRETATION: NORMAL
Lab: NORMAL
PATH INTERP SPEC-IMP: NORMAL

## 2022-05-03 DIAGNOSIS — F41.9 ANXIETY: ICD-10-CM

## 2022-05-03 RX ORDER — ALPRAZOLAM 0.25 MG/1
0.25 TABLET ORAL 2 TIMES DAILY PRN
Qty: 60 TABLET | Refills: 0 | Status: SHIPPED | OUTPATIENT
Start: 2022-05-03 | End: 2022-06-05 | Stop reason: SDUPTHER

## 2022-06-05 DIAGNOSIS — F41.9 ANXIETY: ICD-10-CM

## 2022-06-06 RX ORDER — ALPRAZOLAM 0.25 MG/1
0.25 TABLET ORAL 2 TIMES DAILY PRN
Qty: 60 TABLET | Refills: 0 | Status: SHIPPED | OUTPATIENT
Start: 2022-06-06 | End: 2022-07-07 | Stop reason: SDUPTHER

## 2022-06-06 NOTE — TELEPHONE ENCOUNTER
Requested Prescriptions     Pending Prescriptions Disp Refills    ALPRAZolam (XANAX) 0 25 mg tablet 60 tablet 0     Sig: Take 1 tablet (0 25 mg total) by mouth 2 (two) times a day as needed for anxiety     LOV 2/10/22, F/U 8/10/22, Labs completed

## 2022-06-08 DIAGNOSIS — F41.9 ANXIETY: ICD-10-CM

## 2022-06-08 NOTE — TELEPHONE ENCOUNTER
Requested Prescriptions     Pending Prescriptions Disp Refills    mirtazapine (REMERON) 15 mg tablet [Pharmacy Med Name: MIRTAZAPINE 15 MG TABLET] 135 tablet 1     Sig: TAKE 1 5 TABLETS (22 5 MG TOTAL) BY MOUTH DAILY AT BEDTIME     LOV 2/10/22, F/U 8/10/22, labs completed

## 2022-06-09 RX ORDER — MIRTAZAPINE 15 MG/1
22.5 TABLET, FILM COATED ORAL
Qty: 135 TABLET | Refills: 1 | Status: SHIPPED | OUTPATIENT
Start: 2022-06-09

## 2022-06-29 ENCOUNTER — TELEPHONE (OUTPATIENT)
Dept: FAMILY MEDICINE CLINIC | Facility: CLINIC | Age: 27
End: 2022-06-29

## 2022-06-29 NOTE — TELEPHONE ENCOUNTER
Patient called in and stated that she recently changed pharmacies, patient stated that she would like her prescriptions to now be sent to the [facility] on [address] [facility] in [facility] advise   Thank you

## 2022-07-07 DIAGNOSIS — F41.9 ANXIETY: ICD-10-CM

## 2022-07-07 DIAGNOSIS — R51.9 ACUTE NONINTRACTABLE HEADACHE, UNSPECIFIED HEADACHE TYPE: ICD-10-CM

## 2022-07-08 RX ORDER — ALPRAZOLAM 0.25 MG/1
0.25 TABLET ORAL 2 TIMES DAILY PRN
Qty: 60 TABLET | Refills: 0 | Status: SHIPPED | OUTPATIENT
Start: 2022-07-08 | End: 2022-08-04 | Stop reason: SDUPTHER

## 2022-07-08 RX ORDER — RIZATRIPTAN BENZOATE 10 MG/1
10 TABLET, ORALLY DISINTEGRATING ORAL ONCE AS NEEDED
Qty: 9 TABLET | Refills: 1 | Status: SHIPPED | OUTPATIENT
Start: 2022-07-08 | End: 2022-09-06

## 2022-08-04 DIAGNOSIS — F41.9 ANXIETY: ICD-10-CM

## 2022-08-05 RX ORDER — ALPRAZOLAM 0.25 MG/1
0.25 TABLET ORAL 2 TIMES DAILY PRN
Qty: 60 TABLET | Refills: 0 | Status: SHIPPED | OUTPATIENT
Start: 2022-08-05 | End: 2022-09-02 | Stop reason: SDUPTHER

## 2022-09-04 DIAGNOSIS — R51.9 ACUTE NONINTRACTABLE HEADACHE, UNSPECIFIED HEADACHE TYPE: ICD-10-CM

## 2022-09-05 DIAGNOSIS — F41.9 ANXIETY: ICD-10-CM

## 2022-09-06 RX ORDER — RIZATRIPTAN BENZOATE 10 MG/1
TABLET, ORALLY DISINTEGRATING ORAL
Qty: 9 TABLET | Refills: 1 | Status: SHIPPED | OUTPATIENT
Start: 2022-09-06

## 2022-09-06 RX ORDER — ALPRAZOLAM 0.25 MG/1
0.25 TABLET ORAL 2 TIMES DAILY PRN
Qty: 60 TABLET | Refills: 0 | Status: SHIPPED | OUTPATIENT
Start: 2022-09-06 | End: 2022-09-14

## 2022-09-14 ENCOUNTER — OFFICE VISIT (OUTPATIENT)
Dept: FAMILY MEDICINE CLINIC | Facility: CLINIC | Age: 27
End: 2022-09-14
Payer: COMMERCIAL

## 2022-09-14 VITALS
HEIGHT: 64 IN | DIASTOLIC BLOOD PRESSURE: 64 MMHG | OXYGEN SATURATION: 98 % | BODY MASS INDEX: 29.19 KG/M2 | HEART RATE: 77 BPM | SYSTOLIC BLOOD PRESSURE: 108 MMHG | WEIGHT: 171 LBS

## 2022-09-14 DIAGNOSIS — F41.9 ANXIETY: Primary | ICD-10-CM

## 2022-09-14 DIAGNOSIS — J01.00 ACUTE MAXILLARY SINUSITIS, RECURRENCE NOT SPECIFIED: ICD-10-CM

## 2022-09-14 DIAGNOSIS — R11.15 INTRACTABLE CYCLICAL VOMITING WITH NAUSEA: ICD-10-CM

## 2022-09-14 PROCEDURE — 99214 OFFICE O/P EST MOD 30 MIN: CPT | Performed by: FAMILY MEDICINE

## 2022-09-14 RX ORDER — AZITHROMYCIN 250 MG/1
TABLET, FILM COATED ORAL
Qty: 6 TABLET | Refills: 0 | Status: SHIPPED | OUTPATIENT
Start: 2022-09-14 | End: 2022-09-18

## 2022-09-14 RX ORDER — LORAZEPAM 0.5 MG/1
0.5 TABLET ORAL 2 TIMES DAILY PRN
Qty: 60 TABLET | Refills: 2 | Status: SHIPPED | OUTPATIENT
Start: 2022-09-14

## 2022-09-14 NOTE — PROGRESS NOTES
Assessment and Plan:    Problem List Items Addressed This Visit        Digestive    Intractable cyclical vomiting with nausea     stable            Other    Anxiety - Primary     Not noticing improvement with low dose xanax  , will  Change to Lorazepam         Relevant Medications    LORazepam (Ativan) 0 5 mg tablet      Other Visit Diagnoses     Acute maxillary sinusitis, recurrence not specified        Relevant Medications    azithromycin (ZITHROMAX) 250 mg tablet                 Diagnoses and all orders for this visit:    Anxiety  -     LORazepam (Ativan) 0 5 mg tablet; Take 1 tablet (0 5 mg total) by mouth 2 (two) times a day as needed for anxiety    Intractable cyclical vomiting with nausea    Acute maxillary sinusitis, recurrence not specified  -     azithromycin (ZITHROMAX) 250 mg tablet; 2 1st day, 1/d for 4 days            Subjective:      Patient ID: Pattie Ruiz is a 32 y o  female  CC:    Chief Complaint   Patient presents with    Follow-up     Patient present today for her 6 month follow up  HPI:    Would like to switch anxiety med-not getting adequate sx  Relief  On Xanax, doing well with vomiting and headaches, does have some sinus congestion , did do home test that was negative      The following portions of the patient's history were reviewed and updated as appropriate: allergies, current medications, past family history, past medical history, past social history, past surgical history and problem list         Review of Systems   Constitutional: Negative for activity change, appetite change and fatigue  HENT: Positive for congestion, sinus pressure and sinus pain  Negative for postnasal drip and sore throat  Respiratory: Negative for shortness of breath  Cardiovascular: Negative for chest pain  Neurological: Negative for dizziness and headaches  Psychiatric/Behavioral: The patient is nervous/anxious            Data to review:       Objective:    Vitals:    09/14/22 1447 BP: 108/64   BP Location: Right arm   Patient Position: Sitting   Cuff Size: Large   Pulse: 77   SpO2: 98%   Weight: 77 6 kg (171 lb)   Height: 5' 4" (1 626 m)        Physical Exam  Vitals reviewed  Constitutional:       Appearance: Normal appearance  HENT:      Right Ear: Tympanic membrane normal       Left Ear: Tympanic membrane normal       Nose: Congestion present  No rhinorrhea  Right Turbinates: Swollen  Left Turbinates: Swollen  Right Sinus: Maxillary sinus tenderness present  Left Sinus: Maxillary sinus tenderness present  Mouth/Throat:      Pharynx: No oropharyngeal exudate or posterior oropharyngeal erythema  Cardiovascular:      Rate and Rhythm: Normal rate and regular rhythm  Pulses: Normal pulses  Heart sounds: Normal heart sounds  Pulmonary:      Effort: Pulmonary effort is normal       Breath sounds: Normal breath sounds  Musculoskeletal:      Right lower leg: No edema  Left lower leg: No edema  Lymphadenopathy:      Cervical: No cervical adenopathy  Neurological:      Mental Status: She is alert

## 2022-11-07 DIAGNOSIS — N92.6 MISSED PERIOD: Primary | ICD-10-CM

## 2022-11-08 ENCOUNTER — LAB (OUTPATIENT)
Dept: LAB | Age: 27
End: 2022-11-08

## 2022-11-08 DIAGNOSIS — N92.6 MISSED PERIOD: ICD-10-CM

## 2022-11-08 LAB — HCG SERPL QL: POSITIVE

## 2022-11-13 ENCOUNTER — APPOINTMENT (EMERGENCY)
Dept: ULTRASOUND IMAGING | Facility: HOSPITAL | Age: 27
End: 2022-11-13

## 2022-11-13 ENCOUNTER — HOSPITAL ENCOUNTER (EMERGENCY)
Facility: HOSPITAL | Age: 27
Discharge: HOME | End: 2022-11-13
Attending: EMERGENCY MEDICINE

## 2022-11-13 VITALS
OXYGEN SATURATION: 100 % | WEIGHT: 173.5 LBS | TEMPERATURE: 98.6 F | HEART RATE: 102 BPM | BODY MASS INDEX: 29.78 KG/M2 | DIASTOLIC BLOOD PRESSURE: 71 MMHG | RESPIRATION RATE: 16 BRPM | SYSTOLIC BLOOD PRESSURE: 119 MMHG

## 2022-11-13 DIAGNOSIS — R10.9 ABDOMINAL PAIN DURING PREGNANCY IN FIRST TRIMESTER: ICD-10-CM

## 2022-11-13 DIAGNOSIS — Z3A.01 LESS THAN 8 WEEKS GESTATION OF PREGNANCY: ICD-10-CM

## 2022-11-13 DIAGNOSIS — O26.891 ABDOMINAL PAIN DURING PREGNANCY IN FIRST TRIMESTER: ICD-10-CM

## 2022-11-13 DIAGNOSIS — E87.6 HYPOKALEMIA: ICD-10-CM

## 2022-11-13 DIAGNOSIS — K59.09 OTHER CONSTIPATION: ICD-10-CM

## 2022-11-13 DIAGNOSIS — O21.9 VOMITING DURING PREGNANCY: ICD-10-CM

## 2022-11-13 DIAGNOSIS — E86.0 DEHYDRATION: Primary | ICD-10-CM

## 2022-11-13 DIAGNOSIS — Z34.90 INTRAUTERINE PREGNANCY: ICD-10-CM

## 2022-11-13 LAB
ALBUMIN SERPL BCP-MCNC: 4.7 G/DL (ref 3.5–5)
ALP SERPL-CCNC: 96 U/L (ref 46–116)
ALT SERPL W P-5'-P-CCNC: 38 U/L (ref 12–78)
AMORPH URATE CRY URNS QL MICRO: ABNORMAL /HPF
ANION GAP SERPL CALCULATED.3IONS-SCNC: 14 MMOL/L (ref 4–13)
AST SERPL W P-5'-P-CCNC: 18 U/L (ref 5–45)
B-HCG SERPL-ACNC: ABNORMAL MIU/ML (ref 0–11.6)
BACTERIA UR QL AUTO: ABNORMAL /HPF
BASOPHILS # BLD AUTO: 0.05 THOUSANDS/ÂΜL (ref 0–0.1)
BASOPHILS NFR BLD AUTO: 0 % (ref 0–1)
BILIRUB SERPL-MCNC: 2.01 MG/DL (ref 0.2–1)
BILIRUB UR QL STRIP: ABNORMAL
BUN SERPL-MCNC: 10 MG/DL (ref 5–25)
CALCIUM SERPL-MCNC: 9.7 MG/DL (ref 8.3–10.1)
CHLORIDE SERPL-SCNC: 101 MMOL/L (ref 96–108)
CLARITY UR: ABNORMAL
CO2 SERPL-SCNC: 22 MMOL/L (ref 21–32)
COLOR UR: YELLOW
CREAT SERPL-MCNC: 0.74 MG/DL (ref 0.6–1.3)
EOSINOPHIL # BLD AUTO: 0 THOUSAND/ÂΜL (ref 0–0.61)
EOSINOPHIL NFR BLD AUTO: 0 % (ref 0–6)
ERYTHROCYTE [DISTWIDTH] IN BLOOD BY AUTOMATED COUNT: 13 % (ref 11.6–15.1)
GFR SERPL CREATININE-BSD FRML MDRD: 109 ML/MIN/1.73SQ M
GLUCOSE SERPL-MCNC: 98 MG/DL (ref 65–140)
GLUCOSE UR STRIP-MCNC: NEGATIVE MG/DL
HCT VFR BLD AUTO: 41.9 % (ref 34.8–46.1)
HGB BLD-MCNC: 14.6 G/DL (ref 11.5–15.4)
HGB UR QL STRIP.AUTO: NEGATIVE
IMM GRANULOCYTES # BLD AUTO: 0.1 THOUSAND/UL (ref 0–0.2)
IMM GRANULOCYTES NFR BLD AUTO: 1 % (ref 0–2)
KETONES UR STRIP-MCNC: ABNORMAL MG/DL
LEUKOCYTE ESTERASE UR QL STRIP: NEGATIVE
LIPASE SERPL-CCNC: 52 U/L (ref 73–393)
LYMPHOCYTES # BLD AUTO: 0.95 THOUSANDS/ÂΜL (ref 0.6–4.47)
LYMPHOCYTES NFR BLD AUTO: 9 % (ref 14–44)
MCH RBC QN AUTO: 29.5 PG (ref 26.8–34.3)
MCHC RBC AUTO-ENTMCNC: 34.8 G/DL (ref 31.4–37.4)
MCV RBC AUTO: 85 FL (ref 82–98)
MONOCYTES # BLD AUTO: 0.52 THOUSAND/ÂΜL (ref 0.17–1.22)
MONOCYTES NFR BLD AUTO: 5 % (ref 4–12)
MUCOUS THREADS UR QL AUTO: ABNORMAL
NEUTROPHILS # BLD AUTO: 9.55 THOUSANDS/ÂΜL (ref 1.85–7.62)
NEUTS SEG NFR BLD AUTO: 85 % (ref 43–75)
NITRITE UR QL STRIP: NEGATIVE
NON-SQ EPI CELLS URNS QL MICRO: ABNORMAL /HPF
NRBC BLD AUTO-RTO: 0 /100 WBCS
PH UR STRIP.AUTO: 6 [PH]
PLATELET # BLD AUTO: 352 THOUSANDS/UL (ref 149–390)
PMV BLD AUTO: 9.2 FL (ref 8.9–12.7)
POTASSIUM SERPL-SCNC: 3.2 MMOL/L (ref 3.5–5.3)
PROT SERPL-MCNC: 8.3 G/DL (ref 6.4–8.4)
PROT UR STRIP-MCNC: ABNORMAL MG/DL
RBC # BLD AUTO: 4.95 MILLION/UL (ref 3.81–5.12)
RBC #/AREA URNS AUTO: ABNORMAL /HPF
SODIUM SERPL-SCNC: 137 MMOL/L (ref 135–147)
SP GR UR STRIP.AUTO: >=1.03 (ref 1–1.03)
UROBILINOGEN UR QL STRIP.AUTO: 0.2 E.U./DL
WBC # BLD AUTO: 11.17 THOUSAND/UL (ref 4.31–10.16)
WBC #/AREA URNS AUTO: ABNORMAL /HPF

## 2022-11-13 RX ORDER — ONDANSETRON 4 MG/1
4 TABLET, FILM COATED ORAL EVERY 8 HOURS PRN
Qty: 20 TABLET | Refills: 0 | Status: SHIPPED | OUTPATIENT
Start: 2022-11-13

## 2022-11-13 RX ORDER — ONDANSETRON 2 MG/ML
4 INJECTION INTRAMUSCULAR; INTRAVENOUS ONCE
Status: COMPLETED | OUTPATIENT
Start: 2022-11-13 | End: 2022-11-13

## 2022-11-13 RX ADMIN — ONDANSETRON 4 MG: 2 INJECTION INTRAMUSCULAR; INTRAVENOUS at 14:50

## 2022-11-13 RX ADMIN — SODIUM CHLORIDE 1000 ML: 0.9 INJECTION, SOLUTION INTRAVENOUS at 14:51

## 2022-11-13 RX ADMIN — SODIUM CHLORIDE 1000 ML: 0.9 INJECTION, SOLUTION INTRAVENOUS at 16:23

## 2022-11-13 NOTE — DISCHARGE INSTRUCTIONS
You are to take the zofran as needed for nausea and vomiting  You are to eat a light diet for the next 24-48 hours - BRAT - bananas, rice, applesause, toast   You are to avoid dairy  Drink clear liquids  You are to stay hydrated - water - avoid sodas and sugary juices  You may drink pedialyte or gatorade  Your potassium was mildly low - you are to eat bananas  You are to follow up with your OB on FRiday as scheduled  Your flu/covid has not resulted - download  StubHub for the results in 24-48 hours  You will be notified if abnormal   You are to drink prune juice, use a suppository for constipation, eat raisins    Increase fresh fruits, vegetables and bran/fober

## 2022-11-13 NOTE — ED PROVIDER NOTES
History  Chief Complaint   Patient presents with   • Abdominal Pain Pregnant     Pt reports abd pain for the past week  (+)pregnant (+)N/V     This is a 32year old female who states LMP was 9/29 and states that she had a + pregnancy test on 11/8 that was positive  She states that for the last week or so she has had lower abdominal cramping and pain which seems to be worse at night  She denies any vaginal discharge or bleeding  She states last intercourse was 1 week ago but the pain was before that  She denies hx of ovarian cysts  She states she has not been able to keep food or liquids down x 1 week as well  She states this is her first pregnancy  She has an appt with her OB on this Friday but was scared so she came to the ED for evaluation  Denies fevers, chills, diarrhea  She states she is having difficulty with constipation since yesterday - hard stools  She denies any substance use since finding out she is pregnant  Prior to Admission Medications   Prescriptions Last Dose Informant Patient Reported? Taking?    LORazepam (Ativan) 0 5 mg tablet   No No   Sig: Take 1 tablet (0 5 mg total) by mouth 2 (two) times a day as needed for anxiety   hydrOXYzine HCL (ATARAX) 10 mg tablet   No No   Sig: Take 1 tablet (10 mg total) by mouth every 6 (six) hours as needed for anxiety   mirtazapine (REMERON) 15 mg tablet   No No   Sig: TAKE 1 5 TABLETS (22 5 MG TOTAL) BY MOUTH DAILY AT BEDTIME   norethindrone-ethinyl estradiol-ferrous fumarate (Liana 24 FE) 1-20 MG-MCG(24) per tablet   No No   Sig: Take 1 tablet by mouth daily   omeprazole (PriLOSEC) 20 mg delayed release capsule   No No   Sig: Take 1 capsule (20 mg total) by mouth daily   rizatriptan (MAXALT-MLT) 10 mg disintegrating tablet   No No   Sig: TAKE 1 TABLET ONCE AS NEEDED FOR MIGRAINE FOR UP TO 30 DOSES MAY REPEAT IN 2 HOURS IF NEEDED      Facility-Administered Medications: None       Past Medical History:   Diagnosis Date   • Cyclical vomiting syndrome    • Depression    • Functional dyspnea    • Migraine        Past Surgical History:   Procedure Laterality Date   • CHOLECYSTECTOMY  07/2018   • TONSILLECTOMY         Family History   Problem Relation Age of Onset   • Arthritis Mother    • No Known Problems Father    • Breast cancer Neg Hx    • Colon cancer Neg Hx    • Ovarian cancer Neg Hx    • Uterine cancer Neg Hx      I have reviewed and agree with the history as documented  E-Cigarette/Vaping   • E-Cigarette Use Never User      E-Cigarette/Vaping Substances   • Nicotine No    • THC No    • CBD No    • Flavoring No    • Other No    • Unknown No      Social History     Tobacco Use   • Smoking status: Never Smoker   • Smokeless tobacco: Never Used   Vaping Use   • Vaping Use: Never used   Substance Use Topics   • Alcohol use: Yes     Comment: rarely   • Drug use: Yes     Types: Marijuana       Review of Systems   Constitutional: Negative  HENT: Negative  Eyes: Negative  Respiratory: Negative  Cardiovascular: Negative  Gastrointestinal: Positive for abdominal pain, constipation, nausea and vomiting  Negative for diarrhea  Endocrine: Negative  Genitourinary: Negative  Musculoskeletal: Negative  Skin: Negative  Allergic/Immunologic: Negative  Neurological: Negative  Hematological: Negative  Psychiatric/Behavioral: Negative  Physical Exam  Physical Exam  Vitals and nursing note reviewed  Constitutional:       General: She is not in acute distress  Appearance: Normal appearance  She is normal weight  She is not ill-appearing, toxic-appearing or diaphoretic  HENT:      Head: Normocephalic and atraumatic  Nose: Nose normal       Mouth/Throat:      Mouth: Mucous membranes are moist    Eyes:      Extraocular Movements: Extraocular movements intact  Cardiovascular:      Rate and Rhythm: Normal rate and regular rhythm  Pulses: Normal pulses  Heart sounds: Normal heart sounds     Pulmonary: Effort: Pulmonary effort is normal       Breath sounds: Normal breath sounds  Abdominal:      General: There is no distension  Palpations: Abdomen is soft  Tenderness: There is abdominal tenderness  Comments: Lower abdominal/suprapubic tenderness    Musculoskeletal:         General: Normal range of motion  Cervical back: Normal range of motion and neck supple  Skin:     General: Skin is warm and dry  Capillary Refill: Capillary refill takes less than 2 seconds  Neurological:      General: No focal deficit present  Mental Status: She is alert and oriented to person, place, and time  Psychiatric:         Behavior: Behavior normal          Thought Content:  Thought content normal          Judgment: Judgment normal       Comments: Tearful/fearful, crying          Vital Signs  ED Triage Vitals   Temperature Pulse Respirations Blood Pressure SpO2   11/13/22 1157 11/13/22 1157 11/13/22 1157 11/13/22 1157 11/13/22 1157   98 6 °F (37 °C) (!) 128 20 135/81 98 %      Temp Source Heart Rate Source Patient Position - Orthostatic VS BP Location FiO2 (%)   11/13/22 1157 11/13/22 1454 11/13/22 1157 11/13/22 1157 --   Oral Monitor Sitting Right arm       Pain Score       11/13/22 1157       No Pain           Vitals:    11/13/22 1157 11/13/22 1454   BP: 135/81 122/77   Pulse: (!) 128 (!) 108   Patient Position - Orthostatic VS: Sitting Sitting         Visual Acuity      ED Medications  Medications   sodium chloride 0 9 % bolus 1,000 mL (1,000 mL Intravenous New Bag 11/13/22 1623)   sodium chloride 0 9 % bolus 1,000 mL (0 mL Intravenous Stopped 11/13/22 1622)   ondansetron (ZOFRAN) injection 4 mg (4 mg Intravenous Given 11/13/22 1450)       Diagnostic Studies  Results Reviewed     Procedure Component Value Units Date/Time    Urine Microscopic [[id number]]  (Abnormal) Collected: 11/13/22 1433    Lab Status: Final result Specimen: Urine, Clean Catch Updated: 11/13/22 1634     RBC, UA 0-1 /hpf WBC, UA None Seen /hpf      Epithelial Cells Occasional /hpf      Bacteria, UA Occasional /hpf      AMORPH URATES Moderate /hpf      MUCUS THREADS Occasional    Quantitative hCG [[id number]]  (Abnormal) Collected: 11/13/22 1432    Lab Status: Final result Specimen: Blood from Arm, Right Updated: 11/13/22 1531     HCG, Quant 42,079 mIU/mL     Narrative:       Expected Ranges:     Approximate               Approximate HCG  Gestation age          Concentration ( mIU/mL)  _____________          ______________________   Irl Yann                      HCG values  0 2-1                       5-50  1-2                           2-3                         100-5000  3-4                         500-00425  4-5                         1000-48880  5-6                         50470-783510  6-8                         46272-291078  8-12                        60269-443481      Lipase [[id number]]  (Abnormal) Collected: 11/13/22 1432    Lab Status: Final result Specimen: Blood from Arm, Right Updated: 11/13/22 1506     Lipase 52 u/L     Comprehensive metabolic panel [[id number]]  (Abnormal) Collected: 11/13/22 1432    Lab Status: Final result Specimen: Blood from Arm, Right Updated: 11/13/22 1506     Sodium 137 mmol/L      Potassium 3 2 mmol/L      Chloride 101 mmol/L      CO2 22 mmol/L      ANION GAP 14 mmol/L      BUN 10 mg/dL      Creatinine 0 74 mg/dL      Glucose 98 mg/dL      Calcium 9 7 mg/dL      AST 18 U/L      ALT 38 U/L      Alkaline Phosphatase 96 U/L      Total Protein 8 3 g/dL      Albumin 4 7 g/dL      Total Bilirubin 2 01 mg/dL      eGFR 109 ml/min/1 73sq m     Narrative:      [facility] guidelines for Chronic Kidney Disease (CKD):   •  Stage 1 with normal or high GFR (GFR > 90 mL/min/1 73 square meters)  •  Stage 2 Mild CKD (GFR = 60-89 mL/min/1 73 square meters)  •  Stage 3A Moderate CKD (GFR = 45-59 mL/min/1 73 square meters)  •  Stage 3B Moderate CKD (GFR = 30-44 mL/min/1 73 square meters)  •  Stage 4 Severe CKD (GFR = 15-29 mL/min/1 73 square meters)  •  Stage 5 End Stage CKD (GFR <15 mL/min/1 73 square meters)  Note: GFR calculation is accurate only with a steady state creatinine    UA (URINE) with reflex to Scope [[id number]]  (Abnormal) Collected: 11/13/22 1433    Lab Status: Final result Specimen: Urine, Clean Catch Updated: 11/13/22 1502     Color, UA Yellow     Clarity, UA Slightly Cloudy     Specific Gravity, UA >=1 030     pH, UA 6 0     Leukocytes, UA Negative     Nitrite, UA Negative     Protein, UA 30 (1+) mg/dl      Glucose, UA Negative mg/dl      Ketones, UA >=80 (3+) mg/dl      Urobilinogen, UA 0 2 E U /dl      Bilirubin, UA Small     Occult Blood, UA Negative    FLU/COVID - if FLU clinically relevant [[id number]] Collected: 11/13/22 1452    Lab Status:  In process Specimen: Nares from Nasopharyngeal Swab Updated: 11/13/22 1455    CBC and differential [[id number]]  (Abnormal) Collected: 11/13/22 1432    Lab Status: Final result Specimen: Blood from Arm, Right Updated: 11/13/22 1440     WBC 11 17 Thousand/uL      RBC 4 95 Million/uL      Hemoglobin 14 6 g/dL      Hematocrit 41 9 %      MCV 85 fL      MCH 29 5 pg      MCHC 34 8 g/dL      RDW 13 0 %      MPV 9 2 fL      Platelets 238 Thousands/uL      nRBC 0 /100 WBCs      Neutrophils Relative 85 %      Immat GRANS % 1 %      Lymphocytes Relative 9 %      Monocytes Relative 5 %      Eosinophils Relative 0 %      Basophils Relative 0 %      Neutrophils Absolute 9 55 Thousands/µL      Immature Grans Absolute 0 10 Thousand/uL      Lymphocytes Absolute 0 95 Thousands/µL      Monocytes Absolute 0 52 Thousand/µL      Eosinophils Absolute 0 00 Thousand/µL      Basophils Absolute 0 05 Thousands/µL                  US OB < 14 weeks with transvaginal    (Results Pending)              Procedures  Procedures         ED Course  ED Course as of 11/13/22 1658   Sun Nov 13, 2022   1516 WBC 11 17, K 3 2, urine with > 80 ketones, and protein, most likely due to vomiting, dehydration  1536 HCG QUANTITATIVE(!): 42,079   1547 All labs reviewed and discussed with pt  Pt states not feeling well and has green bile in the emesis bag  She is allergic to reglan  Will order another 1 liter IVF  Us ordered  1634 Pt transported to [facility] 175 asks for ice chips - states her mouth is dry  Explained to RN student to hold off until [address] is completed and reviewed incase it is abnormal     1653 Sign out to [facility]  Waiting on TV US results, re assessment and may need oral challenge  MDM  Number of Diagnoses or Management Options  Diagnosis management comments: 32year old female approximately 6 weeks pregnant with lower abdominal/pelvic pain, n/v, constipation  Denies vaginal bleeding, discharge  DDX: ectopic pregnancy, ovarian cyst rupture, threatened miscarriage (doubt no vag bleeding)    STD (no vag discharge), enteritis, influenza, covid, gastroenteritis    Plan  IV  IVF  Labs  Urine   TV US   Monitor/reassess           Amount and/or Complexity of Data Reviewed  Clinical lab tests: ordered and reviewed  Tests in the radiology section of CPT®: ordered and reviewed  Obtain history from someone other than the patient: yes (Male partner )  Review and summarize past medical records: yes        Disposition  Final diagnoses:   Dehydration   Less than 8 weeks gestation of pregnancy   Abdominal pain during pregnancy in first trimester   Hypokalemia   Vomiting during pregnancy   Other constipation     Time reflects when diagnosis was documented in both MDM as applicable and the Disposition within this note     Time User Action Codes Description Comment    11/13/2022  3:16 PM Mely Hernández [E86 0] Dehydration     11/13/2022  3:16 PM Eletha Proud Add [Z3A 01] Less than 8 weeks gestation of pregnancy     11/13/2022  3:16 PM Eletha Proud Add [O26 891,  R10 9] Abdominal pain during pregnancy in first trimester     11/13/2022  3:26 PM Brandy Grills Add [E87 6] Hypokalemia     11/13/2022  4:35 PM Brandy Grills Add [O21 9] Vomiting during pregnancy     11/13/2022  4:38 PM Austin Grills Add [K59 09] Other constipation       ED Disposition     None      Follow-up Information    None         Patient's Medications   Discharge Prescriptions    ONDANSETRON (ZOFRAN) 4 MG TABLET    Take 1 tablet (4 mg total) by mouth every 8 (eight) hours as needed for nausea or vomiting       Start Date: 11/13/2022End Date: --       Order Dose: 4 mg       Quantity: 20 tablet    Refills: 0       No discharge procedures on file      PDMP Review       Value Time User    PDMP Reviewed  Yes 9/14/2022  3:13 PM Barclay Osler, MD          ED Provider  Electronically Signed by           Trish Guillermo  11/13/22 0048

## 2022-11-13 NOTE — ED NOTES
Pt provided with apple juice and saltine crackers for PO challenge as requested by provider       Miguel Loving  11/13/22 5008

## 2022-11-13 NOTE — Clinical Note
Neisha Randall was seen and treated in our emergency department on 11/13/2022  Diagnosis:     Emily Castaneda  may return to work on return date  She may return on this date: 11/15/2022         If you have any questions or concerns, please don't hesitate to call        James Downey PA-C    ______________________________           _______________          _______________  Hospital Representative                              Date                                Time

## 2022-11-13 NOTE — ED NOTES
Pt provided cup for clean catch urine sample and given instructions  Pt reports having just used the restroom in the waiting room and does not need to urinate  Advised pt to provide sample as soon as she is able       Brody Keane  11/13/22 0532

## 2022-11-14 ENCOUNTER — TELEPHONE (OUTPATIENT)
Dept: OBGYN CLINIC | Facility: CLINIC | Age: 27
End: 2022-11-14

## 2022-11-14 ENCOUNTER — TELEPHONE (OUTPATIENT)
Dept: FAMILY MEDICINE CLINIC | Facility: CLINIC | Age: 27
End: 2022-11-14

## 2022-11-14 DIAGNOSIS — F41.9 ANXIETY: Primary | ICD-10-CM

## 2022-11-14 LAB
FLUAV RNA RESP QL NAA+PROBE: NEGATIVE
FLUBV RNA RESP QL NAA+PROBE: NEGATIVE
SARS-COV-2 RNA RESP QL NAA+PROBE: NEGATIVE

## 2022-11-14 NOTE — TELEPHONE ENCOUNTER
Patient is pregnant and she stopped her ativan  She is having a lot of anxiety and would like something safe that she can take during her pregnancy   You may call it into [facility] on [facility]

## 2022-11-14 NOTE — TELEPHONE ENCOUNTER
Patient went to the ER due to dehydration from nausea and vomiting  They did an US and did not see a heartbeat  Patient is approximately 6 weeks  They ordered HCGs and requested a follow up [address] which is scheduled for 11/22/2022  Patient also questioned anxiety meds  She was taking Ativan but stopped it due to pregnancy  Would like to start something else for anxiety  Advised patient to contact prescribing doctor if she is unable to wait to be seen in our office  Patient agreed  Will call us back if needed

## 2022-11-16 ENCOUNTER — APPOINTMENT (OUTPATIENT)
Dept: LAB | Age: 27
End: 2022-11-16

## 2022-11-16 DIAGNOSIS — Z34.90 INTRAUTERINE PREGNANCY: ICD-10-CM

## 2022-11-16 DIAGNOSIS — O26.891 ABDOMINAL PAIN DURING PREGNANCY IN FIRST TRIMESTER: ICD-10-CM

## 2022-11-16 DIAGNOSIS — R10.9 ABDOMINAL PAIN DURING PREGNANCY IN FIRST TRIMESTER: ICD-10-CM

## 2022-11-16 LAB — B-HCG SERPL-ACNC: ABNORMAL MIU/ML

## 2022-11-22 ENCOUNTER — ULTRASOUND (OUTPATIENT)
Dept: OBGYN CLINIC | Facility: CLINIC | Age: 27
End: 2022-11-22

## 2022-11-22 DIAGNOSIS — O36.80X1 ENCOUNTER TO DETERMINE FETAL VIABILITY OF PREGNANCY, FETUS 1: Primary | ICD-10-CM

## 2022-11-22 NOTE — PROGRESS NOTES
SV FHX=286 BPM  CRL=14mm=7w5d  EDC=7/6/2023    UT=92 x 63 x 59 mm  RT OV=28 x 24 x 22 mm  LT OV=24 x 25 x 24 mm

## 2022-12-05 ENCOUNTER — OB ABSTRACT (OUTPATIENT)
Dept: OBGYN CLINIC | Facility: CLINIC | Age: 27
End: 2022-12-05

## 2022-12-06 ENCOUNTER — INITIAL PRENATAL (OUTPATIENT)
Dept: OBGYN CLINIC | Facility: CLINIC | Age: 27
End: 2022-12-06

## 2022-12-06 VITALS
WEIGHT: 177.8 LBS | DIASTOLIC BLOOD PRESSURE: 80 MMHG | HEIGHT: 65 IN | BODY MASS INDEX: 29.62 KG/M2 | SYSTOLIC BLOOD PRESSURE: 142 MMHG

## 2022-12-06 DIAGNOSIS — R03.0 ELEVATED BP WITHOUT DIAGNOSIS OF HYPERTENSION: ICD-10-CM

## 2022-12-06 DIAGNOSIS — Z34.01 ENCOUNTER FOR SUPERVISION OF NORMAL FIRST PREGNANCY IN FIRST TRIMESTER: Primary | ICD-10-CM

## 2022-12-06 NOTE — PROGRESS NOTES
OB INTAKE INTERVIEW    OB History    Para Term  AB Living   1 0 0 0 0 0   SAB IAB Ectopic Multiple Live Births   0 0 0 0 0      # Outcome Date GA Lbr Duglas/2nd Weight Sex Delivery Anes PTL Lv   1 Current               Obstetric Comments   Menarche 12   Irregular, on OCP       Last Menstrual Period: Patient's last menstrual period was 2022  Estimated Date of Delivery: 2023    Osvaldo Islas is a 32 y o  female  1 para 0 pregnant female who presents for her obstetrical intake  This pregnancy was planned  Father of the baby is involved  Pregnancy symptoms: breast tenderness, nausea, vomiting and frequent urination            BMI: Body mass index is There is no height or weight on file to calculate BMI  Discussed appropriate weight gain in pregnancy based on pre-gravid BMI  Diabetes              Pregestational DM:  no              hx of GDM: no              first degree relative with type 2 diabetes:no              hx of PCOS: no              prior hx of LGA/macrosomia:no           Hypertension   Age 28 or older:no   Obesity (BMI over 30):no              Hx of chronic HTN: no              hx of gestational HTN:no              hx of preeclampsia, eclampsia, or HELLP syndrome: no              Family h/o preeclampsia:no  Renal Disease:no  Autoimmune disease (systemic lupus erythematosus, antiphospholipid antibody syndrome):no   Nulliparity:YES              Multifetal gestation: no  More than 10 year pregnancy interval:no  Previous IUGR, low birthweight or small for gestational age:no     Infection Screening              Does the pt have a hx of MRSA? no              Recent travel outside of [place]? no     Thyroid- if yes order TSH with reflex T4  History of thyroid disease no    Bleeding Disorder or Hx of DVT-patient or first degree relative with history of  Order the following if not done previously     (Factor V, antithrombin III, prothrombin gene mutation, protein C and S Ag, lupus anticoagulant, anticardiolipin, beta-2 glycoprotein)   no    OB/GYN-  History of abnormal pap smear no  History of HPV no  History of Herpes/HSV no  History of other STI (gonorrhea, chlamydia, trich) no    History of prior  no  History of prior  no  History of  delivery prior to 36 weeks 6 days no    History of blood transfusion no  Ok for blood transfusion Yes     Substance screening- if yes outside of tobacco for her or anyone in her home-order urine drug screen      History of tobacco use-former   Currently using alcohol- no  Presently using drugs- no  Past drug use- YES-marijuana  IV drug use-If yes add Hep C antibody to labs-no    Genetic/MFM-    See prenatal genetic history screening in the prenatal episode for genetic history  Discussed carrier screening and consultation with MFM  Patient is not interested  Cystic Fibrosis Screening offered  Patient was advised to check insurance coverage prior to testing  Patient is not interested  Referral to [facility] given for 20 week ultrasound  Prenatal lab work scripts given to patient for routine testing  Additional labs ordered: CMP, Uric Acid and Protein/Creatinine Clearance    Breast Feeding  Breastfeeding benefits discussed and patient does desire to breastfeed  Immunizations:  Discussed Flu, COVID, and Tdap, vaccinations  Interview education  Anticipated course of prenatal care including how and when to contact providers reviewed  HIV and other prenatal labs and testing discussed  Genetic testing discussed  Patient will check insurance coverage  Indications for ultrasonography reviewed  Use of any medications (including supplements, vitamins, herbs or OTC drugs) discussed  Reinforced daily use of prenatal vitamin  Influenza, Covid, and Tdap vaccines counseled and recommendations reviewed  Proper dental care discussed      Weight gain counseling discussed including nutrition counseling; special diet, dietary precautions (mercury, listeriosis)  Reviewed exercise recommendations and routine restrictions for activity including no lifting greater than 20 lbs   Environmental/work hazards reviewed including temperature guidelines and no prolonged stationary standing  Avoidance of saunas, hot tubs, and tanning beds reviewed  Toxoplasmosis precautions (cats/raw meat/unwashed vegetables) discussed  Tobacco/smoking, alcohol, and illicit/recreation drug usage reviewed  Travel safety precautions reviewed including avoiding travel where risk of congenitally transmitted infection(s) is high  Proper seat belt use discussed  Pregnancy packet/folder provided and review with patient  Information on childbirth classes/hospital facilities and Baby and Me resources provided  The patient was oriented to our practice, reviewed delivering physicians and [facility] for [facility]  All questions were answered  Patient to return to the office for first routine prenatal appointment with the provider  This appointment is scheduled  Additional details that I feel the provider should be aware of: BP today was 142/80  Rechecked after sitting for approx 10 minutes  BP was 138/88 when rechecked  Added [address] [facility] labs to prenatal labs per Viktor Figures       Interviewed by: Kylah Belle LPN

## 2022-12-06 NOTE — PATIENT INSTRUCTIONS
Nausea and Vomiting in Pregnancy   WHAT YOU NEED TO KNOW:   What do I need to know about nausea and vomiting in pregnancy? Nausea and vomiting can happen any time of day  These symptoms usually start before the 9th week of pregnancy, and end by the 14th week (second trimester)  Some women can have nausea and vomiting for a longer time  These symptoms can affect some women throughout the entire pregnancy  Nausea and vomiting do not harm your baby  These symptoms can make it hard for you to do your daily activities  What causes or increases my risk for nausea and vomiting in pregnancy? The cause of nausea and vomiting in pregnancy is not known  Pregnancy causes changes in your hormones that may lead to nausea and vomiting  The following may increase your risk of nausea and vomiting:  Being pregnant with more than one baby    Nausea and vomiting in a past pregnancy    Having a sister or mother who had nausea and vomiting during pregnancy    History of migraine headaches or motion sickness    Being pregnant with a female baby    How is nausea and vomiting in pregnancy treated? Treatment for nausea and vomiting in pregnancy is usually not needed  You can make changes in the foods you eat and in your activities to help manage your symptoms  You may need to try several things to learn what works for you  Talk to your healthcare provider if your symptoms do not decrease with the changes suggested below  You may need vitamin B6 and medicine if these changes do not help, or your symptoms become severe  What nutrition changes can I make to manage nausea and vomiting? Eat small meals throughout the day instead of 3 large meals  You may be more likely to have nausea and vomiting when your stomach is empty  Eat foods that are low in fat and high in protein  Examples are lean meat, beans, turkey, and chicken without the skin   Eat a small snack, such as crackers, dry cereal, or a small sandwich before you go to bed     Eat some crackers or dry toast before you get out of bed in the morning  Get out of bed slowly  Sudden movements could cause you to get dizzy and nauseated  Eat bland foods when you feel nauseated  Examples of bland foods include dry toast, dry cereal, plain pasta, white rice, and bread  Other bland foods include saltine crackers, bananas, gelatin, and pretzels  Avoid spicy, greasy, and fried foods  Avoid any other foods that make you feel nauseated  Drink liquids that contain ginger  Drink ginger ale made with real ginger or ginger tea made with fresh grated ginger  Ginger capsules or ginger candies may also help to decrease nausea and vomiting  Drink liquids between meals instead of with meals  Wait at least 30 minutes after you eat to drink liquids  Drink small amounts of liquids often throughout the day to prevent dehydration  Ask how much liquid you should drink each day  What other changes can I make to manage nausea and vomiting? Avoid smells that bother you  Strong odors may cause nausea and vomiting to start, or make it worse  Take a short walk, turn on a fan, or try to sleep with the window open to get fresh air  When you are cooking, open windows to get rid of smells that may cause nausea  Do not brush your teeth right after you eat  if it makes you nauseated  Rest when you need to  Start activity slowly and work up to your usual routine as you start to feel better  Talk to your healthcare provider about your prenatal vitamins  Prenatal vitamins can cause nausea for some women  Try taking your prenatal vitamin at night or with a snack  If this change does not help, your healthcare provider may recommend a different type of vitamin  Do not use any medicines, vitamins, or supplements to manage your symptoms without asking your healthcare provider  Many medicines can harm an unborn baby  Light to moderate exercise  may help to decrease your symptoms   It may also help you to sleep better at night  Ask your healthcare provider about the best exercise plan for you  When should I seek immediate care? You have signs of dehydration  Examples are dark yellow urine, dry mouth and lips, dry skin, fast heartbeat, and urinating less than usual     You have severe abdominal pain  You feel too weak or dizzy to stand up  You see blood in your vomit or bowel movements  When should I call my doctor? You vomit more than 4 times in 1 day  You have not been able to keep liquids down for more than 1 day  You lose more than 2 pounds  You have a fever  Your nausea and vomiting continue longer than 14 weeks  You have questions or concerns about your condition or care  CARE AGREEMENT:   You have the right to help plan your care  Learn about your health condition and how it may be treated  Discuss treatment options with your healthcare providers to decide what care you want to receive  You always have the right to refuse treatment  The above information is an  only  It is not intended as medical advice for individual conditions or treatments  Talk to your doctor, nurse or pharmacist before following any medical regimen to see if it is safe and effective for you  © Copyright [employer] 2022 Information is for End User's use only and may not be sold, redistributed or otherwise used for commercial purposes  All illustrations and images included in CareNotes® are the copyrighted property of A D A M , Inc  or [facility]   Pregnancy Diet   WHAT YOU NEED TO KNOW:   What is a healthy diet during pregnancy? A healthy diet during pregnancy is a meal plan that provides the amount of calories and nutrients you need during pregnancy  Your body needs extra calories and nutrients to support your growing baby  You need to gain the right amount of weight for a healthy baby and pregnancy   Babies born at a healthy weight have a lower risk of certain health problems at birth and later in life  A healthy diet may help you avoid gaining too much weight  Too much weight gain may cause problems for you during your pregnancy and delivery  What should I avoid or limit while I am pregnant? Do not drink alcohol during pregnancy  Alcohol can increase your risk of a miscarriage (losing your baby)  Your baby may also be born too small and have other health problems, such as learning problems later in life  Do not eat raw or undercooked foods  Examples include meat, poultry, eggs, fish, and shellfish (shrimp, crab, lobster)  Cook leftover foods and ready-to-eat foods such as hot dogs until they are steaming hot  Do not have anything that is not pasteurized  Pasteurization is a heating process that destroys bacteria  Do not have milk, juice, or cheese that has not been pasteurized  Cheeses include Brie, feta, Camembert, blue, and [place] cheeses  Limit caffeine to avoid possible health problems  It is not clear how caffeine affects pregnancy  Your dietitian can tell you how much caffeine is okay for you to have in a day or week  Caffeine may be found in coffee, tea, cola, sports drinks, and chocolate  Limit foods that contain mercury  Mercury is naturally found in almost all types of fish and shellfish  Some types of fish absorb higher levels of mercury that can be harmful to an unborn baby  Eat only fish and shellfish that are low in mercury  Each week, you may eat up to 12 ounces of fish or shellfish that have low levels of mercury  These include shrimp, canned light tuna, salmon, pollock, and catfish  Eat only 6 ounces of albacore (white) tuna per week  Albacore tuna has more mercury than canned tuna  Do not eat shark, swordfish, leela mackerel, or tilefish  Which foods can I eat while I am pregnant? Eat a variety of foods from each of the food groups listed below  Eat healthy foods even if you take a prenatal vitamin every day   Your dietitian will tell you how many servings you should have from each food group each day to get enough calories  The amount of calories you need depends on your daily activity, your weight before pregnancy, and current weight gain  In the first trimester, you usually do not need extra calories  In the second and third trimesters, most women should eat about 300 extra calories each day  Fruits and vegetables:  Half of your plate should contain fruits and vegetables  Fruits:  Choose fresh, canned, or dried fruit as often as possible  1 cup of sliced, diced, cooked, or canned fruit (canned in light syrup or 100% juice)    1 large peach, orange, or banana    ½ cup of dried fruit    1 cup of fruit juice    Vegetables:  Eat more dark green, red, and orange vegetables  Dark green vegetables include broccoli, spinach, carrie lettuce, and sebas greens  Examples of orange and red vegetables are carrots, sweet potatoes, winter squash, oranges, and red peppers  1 cup of cooked or raw vegetables    1 cup of vegetable juice    2 cups of raw leafy greens    Grains:  Half of the grains you eat each day should be whole grains  Whole grains:      ½ cup of cooked brown rice or cooked oatmeal    1 cup (1 ounce) of whole-grain dry cereal    1 slice of 744% whole-wheat or rye bread    3 cups of popped popcorn    Other grains:      ½ cup of cooked white rice or pasta    ½ of an English muffin    1 small flour or corn tortilla    1 mini-bagel    Dairy foods:  Choose fat-free or low-fat pasteurized dairy foods:    1½ ounces of hard cheese (mozzarella, Swiss, cheddar)    1 cup (8 ounces) of low-fat or fat-free milk or yogurt    1 cup of low-fat frozen yogurt or pudding    Meat and other protein sources:  Choose lean meats and poultry  Bake, broil, and grill meat instead of frying it  Include a variety of mercury-free seafood in place of some meat and poultry each week   Eat a variety of protein foods:    ½ ounce of nuts (12 almonds, 24 pistachios, 7 walnut halves) or 1 tablespoon of peanut butter (1 ounce)    ¼ cup of soy tofu or tempeh (1 ounce)    1 egg    ¼ cup of cooked dried beans, peas, or lentils (1 ounce)    1 small chicken breast or 1 small trout (about 3 ounces)    1 salmon steak (4 to 6 ounces)    1 small lean hamburger (2 to 3 ounces)    Fats:  Limit saturated fats, trans fats, and cholesterol  These unhealthy fats are found in shortening, butter, stick margarine, and animal fat  Choose healthy fats such as polyunsaturated and monounsaturated fats:    1 tablespoon of canola, olive, corn, sunflower, or soybean oil    1 tablespoon of soft margarine    1 teaspoon of mayonnaise    2 tablespoons of salad dressing    ½ of an avocado    What do I need to know about vitamin and mineral supplements? Your healthcare provider will tell you if you need a supplement and the type you should take  Talk to your provider before you take any other kind of supplement, including herbal (natural) supplements  The following are general guidelines:  Folic acid is one of the most important vitamins during pregnancy  Your prenatal vitamins will also contain folic acid  Make sure you take your prenatal vitamin every day  If you forget to take your vitamin, do not take double the amount the next day  You need at least 554 mcg of folic acid each day before you get pregnant  Folic acid helps to form your baby's brain and spinal cord in early pregnancy  During pregnancy, your daily need for folic acid increases to about 600 mcg  Get folic acid each day by eating citrus fruits and juices, green leafy vegetables, liver, or dried beans  Folic acid is also added to foods such as breakfast cereals, bread products, flour, and pasta  You need about 30 mg of iron each day during pregnancy  Iron is a mineral the body needs to make hemoglobin, which is a part of red blood cells  Hemoglobin helps your blood carry oxygen from the lungs to the rest of your body  Foods that are good sources of iron are meat, poultry, fish, beans, spinach, and fortified cereals and breads  Your body will absorb iron better from non-meat sources if you have a source of vitamin C at the same time  Drink tea and coffee separately from iron-fortified foods and iron supplements  Calcium and vitamin D needs go up during pregnancy  Women who do not eat dairy products may need a calcium and vitamin D supplement  Talk to your dietitian about calcium supplements if you do not regularly eat good sources of calcium  The amount of calcium you need is about 1,300 mg if you are between 15and 25years old and 1,000 mg if you are 23to 48years old  If you cannot drink milk or eat dairy foods, try lactose-free or lactose-reduced milk or calcium-fortified soy milk  Ask your dietitian about pills you can take to help you digest milk products  Eat other drinks and foods that are fortified with calcium, such as orange juice  What diet changes may help morning sickness? Morning sickness is common during the first few months of pregnancy  You may feel nauseated, and you may vomit several times each day  To improve symptoms of morning sickness, eat small meals often instead of 3 large meals  Foods high in carbohydrate, such as crackers, dry toast, and pasta, may be easier for you to eat  Drink liquids between meals rather than with meals  What diet changes may help constipation? A high-fiber diet can improve the symptoms of constipation  Whole-grain breakfast cereals, whole-grain breads, and prune juice are high in fiber  Raw fruits and vegetables, and cooked beans are also good sources of fiber  It may also be helpful to increase your intake of fluids and get regular physical activity  Talk with your healthcare provider before you begin any exercise program        What diet changes may help heartburn? To improve the symptoms of heartburn, do not lie down right after you eat   When you do lie down, sleep with your head slightly elevated  Eat small, frequent meals instead of 3 large meals  It may also be helpful to avoid caffeine, chocolate, and spicy foods  What other healthy guidelines should I follow? Make sure you get enough protein, vitamin B12, and iron if you are a vegetarian or vegan  Some non-meat sources of these nutrients are fortified cereals, nut butters, soy products (tofu and soymilk), nuts, grains, and legumes  These nutrients are also found in eggs and milk products  Talk to your dietitian about how to manage cravings for certain foods  Foods that are high in calories, fat, and sugar should not replace healthy food choices  Some women have cravings for unusual substances such as roland, dirt, laundry starch, ice, and chalk  This condition is called pica  These may lead to health problems such as anemia and cause other health problems  When should I call my dietitian or obstetrician? You are losing weight without trying  You have cravings for substances such as roland, dirt, laundry starch, or ice  You have questions or concerns about your condition or care  CARE AGREEMENT:   You have the right to help plan your care  Discuss treatment options with your healthcare provider to decide what care you want to receive  You always have the right to refuse treatment  The above information is an  only  It is not intended as medical advice for individual conditions or treatments  Talk to your doctor, nurse or pharmacist before following any medical regimen to see if it is safe and effective for you  © Copyright [employer] 2022 Information is for End User's use only and may not be sold, redistributed or otherwise used for commercial purposes  All illustrations and images included in CareNotes® are the copyrighted property of A D A M , Inc  or [employer]   Pregnancy   WHAT YOU NEED TO KNOW:   What do I need to know about pregnancy?   A normal pregnancy lasts about 40 weeks  The first trimester lasts from your last period through the 12th week of pregnancy  The second trimester lasts from the 13th week through the 23rd week  The third trimester lasts from the 24th week until your baby is born  If you know the date of your last period, your healthcare provider can estimate your due date  You may give birth to your baby any time from 37 weeks to 2 weeks after your due date  What is prenatal care? Prenatal care is a series of visits with your healthcare provider throughout your pregnancy  Prenatal care can help prevent problems during pregnancy and childbirth  At each prenatal visit, your healthcare provider will weigh you and check your blood pressure  He or she will also check your baby's heartbeat and growth  You may need the following at some visits:  A pelvic exam  allows your healthcare provider to see your cervix (the bottom part of your uterus)  Your provider will use a speculum to open your vagina  He or she will check the size and shape of your uterus  At your first prenatal visit, you may also have a Pap smear  This is a test to check your cervix for abnormal cells  Blood tests  may be done to check for any of the following:    Gestational diabetes or anemia (low iron level)    Blood type or Rh factor, or certain birth defects    Immunity to certain diseases, such as chickenpox or rubella    An infection, such as a sexually transmitted infection, HIV, or hepatitis B    Hepatitis B  may need to be prevented or treated  Hepatitis B is inflammation of the liver caused by the hepatitis B virus (HBV)  HBV can spread from a mother to her baby during delivery  You will be checked for HBV as early as possible in the first trimester of each pregnancy  You need the test even if you received the hepatitis B vaccine or were tested before  You may need to have an HBV infection treated before you give birth      Urine tests  may also be done to check for sugar and protein  These can be signs of gestational diabetes or preeclampsia  Urine tests may also be done to check for signs of infection  A gestational diabetes screen  may be done  Your healthcare provider may order either a 1-step or 2-step oral glucose tolerance test (OGTT)  1-step OGTT:  Your blood sugar level will be tested after you have not eaten for 8 hours (fasting)  You will then be given a glucose drink  Your level will be tested again 1 hour and 2 hours after you finish the drink  2-step OGTT:  You do not have to fast for the first part of the test  You will have the glucose drink at any time of day  Your blood sugar level will be checked 1 hour later  If your blood sugar is higher than a certain level, another test will be ordered  You will fast and your blood sugar level will be tested  You will have the glucose drink  Your blood will be tested again 1 hour, 2 hours, and 3 hours after you finish the glucose drink  A fetal ultrasound  shows pictures of your baby inside your uterus  The pictures are used to check your baby's development, movement, and position  Genetic disorder screening tests  may be offered to you  These tests check your baby's risk for genetic disorders such as Down syndrome  A screening test may include blood tests and an ultrasound  Blood tests may be used to check your DNA or your partner's DNA  Genetic tests are not always accurate or complete  Your baby may be born with a genetic disorder that did not show up in the tests  Talk to your healthcare provider about any concerns you have with genetic testing  What can I do to have a healthy pregnancy? Eat a variety of healthy foods  Healthy foods include fruits, vegetables, whole-grain breads, low-fat dairy foods, beans, lean meats, and fish  Drink liquids as directed  Ask how much liquid to drink each day and which liquids are best for you  Limit caffeine to less than 200 milligrams each day   Limit your intake of fish to 2 servings each week  Choose fish low in mercury such as canned light tuna, shrimp, crab, salmon, cod, or tilapia  Do not  eat fish high in mercury such as swordfish, tilefish, leela mackerel, and shark  Take prenatal vitamins as directed  Your need for certain vitamins and minerals, such as folic acid, increases during pregnancy  Prenatal vitamins provide some of the extra vitamins and minerals you need  Prenatal vitamins may also help to decrease the risk for certain birth defects  Ask how much weight you should gain during your pregnancy  Too much or too little weight gain can be unhealthy for you and your baby  Talk to your healthcare provider about exercise  Moderate exercise can help you stay fit  Your healthcare provider will help you plan an exercise program that is safe for you during pregnancy  Do not smoke  Smoking increases your risk for a miscarriage and heart and blood vessel problems  Smoking can cause your baby to be born too early or weigh less at birth  Quit smoking as soon as you think you might be pregnant  Ask your healthcare provider for information if you need help quitting  Do not drink alcohol  Alcohol passes from your body to your baby through the placenta  It can affect your baby's brain development and cause fetal alcohol syndrome (FAS)  FAS is a group of conditions that causes mental, behavior, and growth problems  Talk to your healthcare provider before you take any medicines  Many medicines may harm your baby if you take them when you are pregnant  Do not take any medicines, vitamins, herbs, or supplements without first talking to your healthcare provider  Never use illegal or street drugs (such as marijuana or cocaine) while you are pregnant  What body changes may happen during my pregnancy? Breast changes  you will experience include tenderness and tingling during the early part of your pregnancy  Your breasts will become larger  You may need to use a support bra  You may see a thin, yellow fluid, called colostrum, leak from your nipples during the second trimester  Colostrum is a liquid that changes to milk about 3 days after you give birth  Skin changes and stretch marks  may occur during your pregnancy  You may have red marks, called stretch marks, on your skin  Stretch marks will usually fade after pregnancy  Use lotion if your skin is dry and itchy  The skin on your face, around your nipples, and below your belly button may darken  Most of the time, your skin will return to its normal color after your baby is born  Morning sickness  is nausea and vomiting that can happen at any time of day  Avoid fatty and spicy foods  Eat small meals throughout the day instead of large meals  [place] may help to decrease nausea  Ask your healthcare provider about other ways of decreasing nausea and vomiting  Heartburn  may be caused by changes in your hormones during pregnancy  Your growing uterus may also push your stomach upward and force stomach acid to back up into your esophagus  Eat 4 or 5 small meals each day instead of large meals  Avoid spicy foods  Avoid eating right before bedtime  Constipation  may develop during your pregnancy  To treat constipation, eat foods high in fiber such as fiber cereals, beans, fruits, vegetables, whole-grain breads, and prune juice  Get regular exercise and drink plenty of water  Your healthcare provider may also suggest a fiber supplement to soften your bowel movements  Talk to your healthcare provider before you use any medicines to decrease constipation  Hemorrhoids  are enlarged veins in the rectal area  They may cause pain, itching, and bright red bleeding from your rectum  To decrease your risk for hemorrhoids, prevent constipation and do not strain to have a bowel movement  If you have hemorrhoids, soak in a tub of warm water to ease discomfort   Ask your healthcare provider how you can treat hemorrhoids  Leg cramps and swelling  may be caused by low calcium levels or the added weight of pregnancy  Raise your legs above the level of your heart to decrease swelling  During a leg cramp, stretch or massage the muscle that has the cramp  Heat may help decrease pain and muscle spasms  Apply heat on your muscle for 20 to 30 minutes every 2 hours for as many days as directed  Back pain  may occur as your baby grows  Do not stand for long periods of time or lift heavy items  Use good posture while you stand, squat, or bend  Wear low-heeled shoes with good support  Rest may also help to relieve back pain  Ask your healthcare provider about exercises you can do to strengthen your back muscles  What are some safety tips during pregnancy? Avoid hot tubs and saunas  Do not use a hot tub or sauna while you are pregnant, especially during your first trimester  Hot tubs and saunas may raise your baby's temperature and increase the risk for birth defects  Avoid toxoplasmosis  This is an infection caused by eating raw meat or being around infected cat feces  It can cause birth defects, miscarriages, and other problems  Wash your hands after you touch raw meat  Make sure any meat is well-cooked before you eat it  Avoid raw eggs and unpasteurized milk  Use gloves or ask someone else to clean your cat's litter box while you are pregnant  Ask your healthcare provider about travel  The most comfortable time to travel is during the second trimester  Ask your healthcare provider if you can travel after 36 weeks  You may not be able to travel in an airplane after 36 weeks  He or she may also recommend that you avoid long road trips  When should I seek immediate care? You develop a severe headache that does not go away  You have new or increased vision changes, such as blurred or spotted vision  You have new or increased swelling in your face or hands      You have pain or cramping in your abdomen or low back  You have vaginal bleeding  When should I call my doctor or obstetrician? You have abdominal cramps, pressure, or tightening  You have a change in vaginal discharge  You cannot keep food or drinks down, and you are losing weight  You have chills or a fever  You have vaginal itching, burning, or pain  You have yellow, green, white, or foul-smelling vaginal discharge  You have pain or burning when you urinate, less urine than usual, or pink or bloody urine  You have questions or concerns about your condition or care  CARE AGREEMENT:   You have the right to help plan your care  Learn about your health condition and how it may be treated  Discuss treatment options with your healthcare providers to decide what care you want to receive  You always have the right to refuse treatment  The above information is an  only  It is not intended as medical advice for individual conditions or treatments  Talk to your doctor, nurse or pharmacist before following any medical regimen to see if it is safe and effective for you  © Copyright [employer] [facility] 2022 Information is for End User's use only and may not be sold, redistributed or otherwise used for commercial purposes   All illustrations and images included in CareNotes® are the copyrighted property of [employer]  or [address] [place] [address] [employer]

## 2022-12-07 ENCOUNTER — LAB (OUTPATIENT)
Dept: LAB | Age: 27
End: 2022-12-07

## 2022-12-07 DIAGNOSIS — Z34.01 ENCOUNTER FOR SUPERVISION OF NORMAL FIRST PREGNANCY IN FIRST TRIMESTER: ICD-10-CM

## 2022-12-07 DIAGNOSIS — R03.0 ELEVATED BP WITHOUT DIAGNOSIS OF HYPERTENSION: ICD-10-CM

## 2022-12-07 DIAGNOSIS — F41.9 ANXIETY: ICD-10-CM

## 2022-12-07 LAB
ABO GROUP BLD: NORMAL
ALBUMIN SERPL BCP-MCNC: 3.3 G/DL (ref 3.5–5)
ALP SERPL-CCNC: 77 U/L (ref 46–116)
ALT SERPL W P-5'-P-CCNC: 23 U/L (ref 12–78)
ANION GAP SERPL CALCULATED.3IONS-SCNC: 8 MMOL/L (ref 4–13)
AST SERPL W P-5'-P-CCNC: 12 U/L (ref 5–45)
BASOPHILS # BLD AUTO: 0.04 THOUSANDS/ÂΜL (ref 0–0.1)
BASOPHILS NFR BLD AUTO: 0 % (ref 0–1)
BILIRUB SERPL-MCNC: 0.73 MG/DL (ref 0.2–1)
BLD GP AB SCN SERPL QL: NEGATIVE
BUN SERPL-MCNC: 10 MG/DL (ref 5–25)
CALCIUM ALBUM COR SERPL-MCNC: 10.5 MG/DL (ref 8.3–10.1)
CALCIUM SERPL-MCNC: 9.9 MG/DL (ref 8.3–10.1)
CHLORIDE SERPL-SCNC: 107 MMOL/L (ref 96–108)
CO2 SERPL-SCNC: 21 MMOL/L (ref 21–32)
CREAT SERPL-MCNC: 0.68 MG/DL (ref 0.6–1.3)
EOSINOPHIL # BLD AUTO: 0.03 THOUSAND/ÂΜL (ref 0–0.61)
EOSINOPHIL NFR BLD AUTO: 0 % (ref 0–6)
ERYTHROCYTE [DISTWIDTH] IN BLOOD BY AUTOMATED COUNT: 12.9 % (ref 11.6–15.1)
GFR SERPL CREATININE-BSD FRML MDRD: 118 ML/MIN/1.73SQ M
GLUCOSE P FAST SERPL-MCNC: 121 MG/DL (ref 65–99)
HBV SURFACE AG SER QL: NORMAL
HCT VFR BLD AUTO: 40.2 % (ref 34.8–46.1)
HGB BLD-MCNC: 13.5 G/DL (ref 11.5–15.4)
IMM GRANULOCYTES # BLD AUTO: 0.09 THOUSAND/UL (ref 0–0.2)
IMM GRANULOCYTES NFR BLD AUTO: 1 % (ref 0–2)
LYMPHOCYTES # BLD AUTO: 1.16 THOUSANDS/ÂΜL (ref 0.6–4.47)
LYMPHOCYTES NFR BLD AUTO: 11 % (ref 14–44)
MCH RBC QN AUTO: 29.5 PG (ref 26.8–34.3)
MCHC RBC AUTO-ENTMCNC: 33.6 G/DL (ref 31.4–37.4)
MCV RBC AUTO: 88 FL (ref 82–98)
MONOCYTES # BLD AUTO: 0.32 THOUSAND/ÂΜL (ref 0.17–1.22)
MONOCYTES NFR BLD AUTO: 3 % (ref 4–12)
NEUTROPHILS # BLD AUTO: 8.79 THOUSANDS/ÂΜL (ref 1.85–7.62)
NEUTS SEG NFR BLD AUTO: 85 % (ref 43–75)
NRBC BLD AUTO-RTO: 0 /100 WBCS
PLATELET # BLD AUTO: 326 THOUSANDS/UL (ref 149–390)
PMV BLD AUTO: 9.8 FL (ref 8.9–12.7)
POTASSIUM SERPL-SCNC: 3.6 MMOL/L (ref 3.5–5.3)
PROT SERPL-MCNC: 7.4 G/DL (ref 6.4–8.4)
RBC # BLD AUTO: 4.58 MILLION/UL (ref 3.81–5.12)
RH BLD: POSITIVE
RUBV IGG SERPL IA-ACNC: >175 IU/ML
SODIUM SERPL-SCNC: 136 MMOL/L (ref 135–147)
SPECIMEN EXPIRATION DATE: NORMAL
URATE SERPL-MCNC: 3.2 MG/DL (ref 2–7.5)
WBC # BLD AUTO: 10.43 THOUSAND/UL (ref 4.31–10.16)

## 2022-12-08 LAB
HIV 1+2 AB+HIV1 P24 AG SERPL QL IA: NORMAL
RPR SER QL: NORMAL

## 2022-12-09 ENCOUNTER — APPOINTMENT (OUTPATIENT)
Dept: LAB | Age: 27
End: 2022-12-09

## 2022-12-09 LAB
BACTERIA UR QL AUTO: ABNORMAL /HPF
BILIRUB UR QL STRIP: NEGATIVE
CLARITY UR: CLEAR
COLOR UR: ABNORMAL
CREAT UR-MCNC: 134 MG/DL
GLUCOSE UR STRIP-MCNC: ABNORMAL MG/DL
HGB UR QL STRIP.AUTO: NEGATIVE
KETONES UR STRIP-MCNC: NEGATIVE MG/DL
LEUKOCYTE ESTERASE UR QL STRIP: NEGATIVE
MUCOUS THREADS UR QL AUTO: ABNORMAL
NITRITE UR QL STRIP: NEGATIVE
NON-SQ EPI CELLS URNS QL MICRO: ABNORMAL /HPF
PH UR STRIP.AUTO: 6.5 [PH]
PROT UR STRIP-MCNC: ABNORMAL MG/DL
PROT UR-MCNC: 11 MG/DL
PROT/CREAT UR: 0.08 MG/G{CREAT} (ref 0–0.1)
RBC #/AREA URNS AUTO: ABNORMAL /HPF
SP GR UR STRIP.AUTO: 1.02 (ref 1–1.03)
UROBILINOGEN UR STRIP-ACNC: <2 MG/DL
WBC #/AREA URNS AUTO: ABNORMAL /HPF

## 2022-12-10 LAB — BACTERIA UR CULT: NORMAL

## 2022-12-12 NOTE — RESULT ENCOUNTER NOTE
PN labs NL, pih labs NL  Glucose was elevated, but patient may not have been fasting  Please confirm with her

## 2022-12-22 ENCOUNTER — ROUTINE PRENATAL (OUTPATIENT)
Dept: OBGYN CLINIC | Facility: CLINIC | Age: 27
End: 2022-12-22

## 2022-12-22 VITALS — BODY MASS INDEX: 29.45 KG/M2 | WEIGHT: 177 LBS | SYSTOLIC BLOOD PRESSURE: 98 MMHG | DIASTOLIC BLOOD PRESSURE: 60 MMHG

## 2022-12-22 DIAGNOSIS — R81 GLUCOSURIA: Primary | ICD-10-CM

## 2022-12-22 DIAGNOSIS — Z11.3 SCREENING EXAMINATION FOR STD (SEXUALLY TRANSMITTED DISEASE): ICD-10-CM

## 2022-12-22 DIAGNOSIS — Z34.91 ENCOUNTER FOR PREGNANCY RELATED EXAMINATION IN FIRST TRIMESTER: ICD-10-CM

## 2022-12-22 LAB — EXTERNAL CHLAMYDIA SCREEN: NEGATIVE

## 2022-12-22 NOTE — PROGRESS NOTES
33 yo  @ 12w 0d  Denies VB, pain   BP at intake 142/80 and 138/88, BP today 98/60  Pt has been feeling well up until yesterday when she developed a migraine with nausea  She has not taken anything for sx  She has Zofran, advised to take along with Excedrin Tension HA  Pap 3/22/22  GC/CT done today, pelvic exam normal with 12 cm uterus noted, no palpable masses or tenderness  PN labs, elevated fasting glucose (pt states she was fasting for blood work) and glucose in urine, 1 hr gtt recommended, script given  PIH labs normal    Pt declines MFM appt   Advised to schedule 20 week anatomy US visit  Reasons to call office reviewed  RTO in 4 weeks
You can access the FollowMyHealth Patient Portal offered by [facility] by registering at the following website: [email or web address]. By joining [employer]’s FollowMyHealth portal, you will also be able to view your health information using other applications (apps) compatible with our system.

## 2022-12-23 LAB
C TRACH DNA SPEC QL NAA+PROBE: NEGATIVE
N GONORRHOEA DNA SPEC QL NAA+PROBE: NEGATIVE

## 2023-01-06 ENCOUNTER — APPOINTMENT (OUTPATIENT)
Dept: LAB | Age: 28
End: 2023-01-06

## 2023-01-06 DIAGNOSIS — R81 GLUCOSURIA: ICD-10-CM

## 2023-01-06 LAB — GLUCOSE 1H P 50 G GLC PO SERPL-MCNC: 129 MG/DL (ref 40–134)

## 2023-01-11 DIAGNOSIS — F41.9 ANXIETY: ICD-10-CM

## 2023-01-11 RX ORDER — MIRTAZAPINE 15 MG/1
22.5 TABLET, FILM COATED ORAL
Qty: 135 TABLET | Refills: 1 | OUTPATIENT
Start: 2023-01-11

## 2023-01-12 ENCOUNTER — OFFICE VISIT (OUTPATIENT)
Dept: FAMILY MEDICINE CLINIC | Facility: CLINIC | Age: 28
End: 2023-01-12

## 2023-01-12 VITALS
RESPIRATION RATE: 18 BRPM | DIASTOLIC BLOOD PRESSURE: 68 MMHG | BODY MASS INDEX: 30.49 KG/M2 | HEIGHT: 65 IN | OXYGEN SATURATION: 100 % | HEART RATE: 72 BPM | WEIGHT: 183 LBS | SYSTOLIC BLOOD PRESSURE: 100 MMHG | TEMPERATURE: 96.1 F

## 2023-01-12 DIAGNOSIS — G47.00 INSOMNIA, UNSPECIFIED TYPE: ICD-10-CM

## 2023-01-12 DIAGNOSIS — O21.9 VOMITING DURING PREGNANCY: ICD-10-CM

## 2023-01-12 DIAGNOSIS — Z23 NEED FOR INFLUENZA VACCINATION: ICD-10-CM

## 2023-01-12 DIAGNOSIS — R11.15 INTRACTABLE CYCLICAL VOMITING WITH NAUSEA: ICD-10-CM

## 2023-01-12 DIAGNOSIS — E86.0 DEHYDRATION: ICD-10-CM

## 2023-01-12 DIAGNOSIS — F41.9 ANXIETY: Primary | ICD-10-CM

## 2023-01-12 RX ORDER — ONDANSETRON 4 MG/1
4 TABLET, FILM COATED ORAL EVERY 8 HOURS PRN
Qty: 20 TABLET | Refills: 0 | Status: SHIPPED | OUTPATIENT
Start: 2023-01-12

## 2023-01-12 RX ORDER — MIRTAZAPINE 30 MG/1
30 TABLET, FILM COATED ORAL
Qty: 30 TABLET | Refills: 5 | Status: SHIPPED | OUTPATIENT
Start: 2023-01-12 | End: 2023-02-11

## 2023-01-12 RX ORDER — MIRTAZAPINE 15 MG/1
22.5 TABLET, FILM COATED ORAL
Qty: 135 TABLET | Refills: 1 | Status: CANCELLED | OUTPATIENT
Start: 2023-01-12

## 2023-01-12 NOTE — PROGRESS NOTES
Name: Jann Jett      : 1995      MRN: [id number]  Encounter Provider: Josie Hernandez MD  Encounter Date: 2023   Encounter department: [facility]    Assessment & Plan     1  Anxiety  Assessment & Plan:  Stopped all med regimen, mild anxiety at time  Orders:  -     mirtazapine (REMERON) 30 mg tablet; Take 1 tablet (30 mg total) by mouth daily at bedtime    2  Dehydration  -     ondansetron (ZOFRAN) 4 mg tablet; Take 1 tablet (4 mg total) by mouth every 8 (eight) hours as needed for nausea or vomiting    3  Vomiting during pregnancy  -     ondansetron (ZOFRAN) 4 mg tablet; Take 1 tablet (4 mg total) by mouth every 8 (eight) hours as needed for nausea or vomiting    4  Intractable cyclical vomiting with nausea  Assessment & Plan:  stable      5  Insomnia, unspecified type  -     mirtazapine (REMERON) 30 mg tablet; Take 1 tablet (30 mg total) by mouth daily at bedtime    6  Need for influenza vaccination  -     influenza vaccine, quadrivalent, 0 5 mL, preservative-free, for adult and pediatric patients 6 mos+ (AFLURIA, FLUARIX, FLULAVAL, FLUZONE)        Depression Screening and Follow-up Plan: Patient was screened for depression during today's encounter  They screened negative with a PHQ-2 score of 0  Subjective      Pt presents for medication refill  Denies shortness of breath, headache, or chest pain  Pregnant with 1st child  States she feels well overall  Mild anxiety at times  Review of Systems   Constitutional: Positive for appetite change and fatigue  Negative for activity change  States she's hungry all the time  Respiratory: Negative for shortness of breath  Cardiovascular: Negative for chest pain  Gastrointestinal: Positive for nausea  Pregnant  Nausea at times  Musculoskeletal: Negative for arthralgias  Neurological: Negative for headaches  Psychiatric/Behavioral: The patient is nervous/anxious          Current Outpatient Medications on File Prior to Visit   Medication Sig   • Prenatal Vit-Iron Carbonyl-FA (prenatal multivitamin) TABS Take 1 tablet by mouth daily   • [DISCONTINUED] mirtazapine (REMERON) 15 mg tablet TAKE 1 5 TABLETS (22 5 MG TOTAL) BY MOUTH DAILY AT BEDTIME   • [DISCONTINUED] ondansetron (ZOFRAN) 4 mg tablet Take 1 tablet (4 mg total) by mouth every 8 (eight) hours as needed for nausea or vomiting   • [DISCONTINUED] sertraline (ZOLOFT) 50 mg tablet TAKE 1 TABLET BY MOUTH EVERY DAY   • [DISCONTINUED] hydrOXYzine HCL (ATARAX) 10 mg tablet Take 1 tablet (10 mg total) by mouth every 6 (six) hours as needed for anxiety (Patient not taking: Reported on 12/6/2022)   • [DISCONTINUED] LORazepam (Ativan) 0 5 mg tablet Take 1 tablet (0 5 mg total) by mouth 2 (two) times a day as needed for anxiety (Patient not taking: Reported on 12/6/2022)   • [DISCONTINUED] norethindrone-ethinyl estradiol-ferrous fumarate (Liana 24 FE) 1-20 MG-MCG(24) per tablet Take 1 tablet by mouth daily (Patient not taking: Reported on 12/6/2022)   • [DISCONTINUED] omeprazole (PriLOSEC) 20 mg delayed release capsule Take 1 capsule (20 mg total) by mouth daily (Patient not taking: Reported on 12/6/2022)   • [DISCONTINUED] rizatriptan (MAXALT-MLT) 10 mg disintegrating tablet TAKE 1 TABLET ONCE AS NEEDED FOR MIGRAINE FOR UP TO 30 DOSES MAY REPEAT IN 2 HOURS IF NEEDED (Patient not taking: Reported on 12/6/2022)       Objective     /68 (BP Location: Right arm, Patient Position: Sitting, Cuff Size: Standard)   Pulse 72   Temp (!) 96 1 °F (35 6 °C) (Tympanic)   Resp 18   Ht 5' 5" (1 651 m)   Wt 83 kg (183 lb)   LMP 09/29/2022 (Exact Date)   SpO2 100%   BMI 30 45 kg/m²     Physical Exam  Vitals reviewed  Constitutional:       Appearance: Normal appearance  Cardiovascular:      Rate and Rhythm: Normal rate and regular rhythm  Pulses: Normal pulses  Heart sounds: Normal heart sounds     Pulmonary:      Effort: Pulmonary effort is normal  Breath sounds: Normal breath sounds  Abdominal:      Comments: pregnant   Musculoskeletal:      Right lower leg: No edema  Left lower leg: No edema  Neurological:      Mental Status: She is alert     Psychiatric:         Mood and Affect: Mood normal          Behavior: Behavior normal        Barclay Osler, MD

## 2023-01-13 ENCOUNTER — TELEPHONE (OUTPATIENT)
Dept: FAMILY MEDICINE CLINIC | Facility: CLINIC | Age: 28
End: 2023-01-13

## 2023-01-13 NOTE — TELEPHONE ENCOUNTER
Flu shot ordered but  Not given, can someone  Contact pt to come in for flu shot at her  convenience

## 2023-01-16 NOTE — TELEPHONE ENCOUNTER
Called patient to make aware of providers recommendations for her to schedule flu shot, mailbox full could not leave voicemail

## 2023-01-24 ENCOUNTER — ROUTINE PRENATAL (OUTPATIENT)
Dept: OBGYN CLINIC | Facility: CLINIC | Age: 28
End: 2023-01-24

## 2023-01-24 ENCOUNTER — TELEPHONE (OUTPATIENT)
Dept: OBGYN CLINIC | Facility: CLINIC | Age: 28
End: 2023-01-24

## 2023-01-24 VITALS
SYSTOLIC BLOOD PRESSURE: 122 MMHG | DIASTOLIC BLOOD PRESSURE: 80 MMHG | BODY MASS INDEX: 31.59 KG/M2 | HEIGHT: 65 IN | HEART RATE: 110 BPM | WEIGHT: 189.6 LBS

## 2023-01-24 DIAGNOSIS — Z34.02 ENCOUNTER FOR SUPERVISION OF NORMAL FIRST PREGNANCY IN SECOND TRIMESTER: ICD-10-CM

## 2023-01-24 DIAGNOSIS — O99.342 DEPRESSION AFFECTING PREGNANCY IN SECOND TRIMESTER, ANTEPARTUM: Primary | ICD-10-CM

## 2023-01-24 DIAGNOSIS — F32.A DEPRESSION AFFECTING PREGNANCY IN SECOND TRIMESTER, ANTEPARTUM: Primary | ICD-10-CM

## 2023-01-24 DIAGNOSIS — O21.9 VOMITING DURING PREGNANCY: ICD-10-CM

## 2023-01-24 DIAGNOSIS — O09.891 MEDICATION EXPOSURE DURING FIRST TRIMESTER OF PREGNANCY: ICD-10-CM

## 2023-01-24 DIAGNOSIS — E86.0 DEHYDRATION: ICD-10-CM

## 2023-01-24 RX ORDER — ONDANSETRON 4 MG/1
4 TABLET, FILM COATED ORAL EVERY 8 HOURS PRN
Qty: 20 TABLET | Refills: 0 | Status: SHIPPED | OUTPATIENT
Start: 2023-01-24

## 2023-01-24 RX ORDER — ASPIRIN 81 MG/1
162 TABLET ORAL DAILY
Qty: 180 TABLET | Refills: 2 | Status: SHIPPED | OUTPATIENT
Start: 2023-01-24

## 2023-01-24 NOTE — PROGRESS NOTES
33 yo  at 16w5d gestation; anxiety (taking Remeron); migraine/N/V    Has continued seeing PCP re: her depression/anxiety  States that her PCP tried adding zoloft for her anxiety but it did not help  She has been taking Remeron since she conceived  BP elevated in early 2022 142/80   PN & PIH labs NL, early 1 hour GTT nl at 129  Declined genetic screening  Not taking ASA- counseled today re: benefits of ASA for decreasing risk of pre-eclampsia  She agrees  Rx sent to pharmacy  20w US appt 23    + quickening, denies LOF/VB  + RL pain  S=D, normal FHTs, normal BP    She has her level 2 US on 23 with [facility]  I will inform them of her Remeron use  RV 4w    *23  TC made to [facility]  Staff will notify perinatologist who will be seeing patient for her level 2 US about use of Remeron

## 2023-01-24 NOTE — TELEPHONE ENCOUNTER
Attempted to call patient regarding her visit with [facility] for her 20 week US and that I will be letting them know that she has been taking Remeron for her depression

## 2023-02-16 ENCOUNTER — ROUTINE PRENATAL (OUTPATIENT)
Dept: OBGYN CLINIC | Facility: CLINIC | Age: 28
End: 2023-02-16

## 2023-02-16 VITALS
SYSTOLIC BLOOD PRESSURE: 112 MMHG | WEIGHT: 198.2 LBS | DIASTOLIC BLOOD PRESSURE: 60 MMHG | BODY MASS INDEX: 33.02 KG/M2 | HEIGHT: 65 IN

## 2023-02-16 DIAGNOSIS — O99.342 DEPRESSION AFFECTING PREGNANCY IN SECOND TRIMESTER, ANTEPARTUM: Primary | ICD-10-CM

## 2023-02-16 DIAGNOSIS — O99.210 MATERNAL OBESITY, ANTEPARTUM: ICD-10-CM

## 2023-02-16 DIAGNOSIS — Z3A.20 20 WEEKS GESTATION OF PREGNANCY: ICD-10-CM

## 2023-02-16 DIAGNOSIS — F32.A DEPRESSION AFFECTING PREGNANCY IN SECOND TRIMESTER, ANTEPARTUM: Primary | ICD-10-CM

## 2023-02-16 DIAGNOSIS — Z23 NEED FOR INFLUENZA VACCINATION: ICD-10-CM

## 2023-02-16 NOTE — PROGRESS NOTES
Pt is a 32 y o  Tamanna Harden 20w0d  Pregnancy is complicated by anxiety (taking Remeron); obesity, migraines, N/V  Pt reports +FM  Denies vb, lof, ctx  PTL precautions reviewed  Has anatomy scan tomorrow  Had baseline PIH labs due to elevated bp at 9 weeks gestation  ASA-taking  Influenza vaccination administered today  Declines genetic screening  F/u 4 weeks

## 2023-02-17 ENCOUNTER — ROUTINE PRENATAL (OUTPATIENT)
Dept: PERINATAL CARE | Facility: CLINIC | Age: 28
End: 2023-02-17

## 2023-02-17 VITALS
BODY MASS INDEX: 32.96 KG/M2 | HEART RATE: 98 BPM | DIASTOLIC BLOOD PRESSURE: 72 MMHG | HEIGHT: 65 IN | SYSTOLIC BLOOD PRESSURE: 126 MMHG | WEIGHT: 197.8 LBS

## 2023-02-17 DIAGNOSIS — Z34.01 ENCOUNTER FOR SUPERVISION OF NORMAL FIRST PREGNANCY IN FIRST TRIMESTER: ICD-10-CM

## 2023-02-17 DIAGNOSIS — Z3A.20 20 WEEKS GESTATION OF PREGNANCY: Primary | ICD-10-CM

## 2023-02-17 DIAGNOSIS — Z36.86 ENCOUNTER FOR ANTENATAL SCREENING FOR CERVICAL LENGTH: ICD-10-CM

## 2023-02-17 DIAGNOSIS — Z36.3 ENCOUNTER FOR ANTENATAL SCREENING FOR MALFORMATION: ICD-10-CM

## 2023-02-17 DIAGNOSIS — O44.02 PLACENTA PREVIA IN SECOND TRIMESTER: ICD-10-CM

## 2023-02-17 NOTE — LETTER
February 17, 2023     Celia Mcdaniel, [address] [facility] [address]  [id number]    Patient: Hannah Abel   YOB: 1995   Date of Visit: 2/17/2023       Dear Dr Jhonathan Carr: Thank you for referring Hannah Abel to me for evaluation  Below are my notes for this consultation  If you have questions, please do not hesitate to call me  I look forward to following your patient along with you  Sincerely,        Michelle Cassidy MD        CC: No Recipients  Michelle Cassidy MD  2/17/2023  2:34 PM  Sign when Signing Visit  [address]: Ms Gabriel Dunham was seen today for anatomic survey and cervical length screening ultrasound  See ultrasound report under "OB Procedures" tab  Review of Systems   Constitutional: Negative for chills, fever and unexpected weight change  HENT: Negative for congestion, dental problem, facial swelling and sore throat  Eyes: Negative for visual disturbance  Respiratory: Negative for cough and shortness of breath  Cardiovascular: Negative for chest pain and palpitations  Gastrointestinal: Negative for diarrhea and vomiting  Endocrine: Negative for polydipsia  Genitourinary: Negative for dysuria and vaginal bleeding  Musculoskeletal: Positive for back pain  Negative for joint swelling  Skin: Negative for rash and wound  Allergic/Immunologic: Negative for immunocompromised state  Neurological: Negative for seizures and headaches  Hematological: Does not bruise/bleed easily  Psychiatric/Behavioral: Negative for dysphoric mood, hallucinations and suicidal ideas  Physical Exam  Constitutional:       General: She is not in acute distress  Appearance: Normal appearance  HENT:      Head: Normocephalic and atraumatic  Eyes:      Extraocular Movements: Extraocular movements intact  Cardiovascular:      Rate and Rhythm: Normal rate     Pulmonary:      Effort: Pulmonary effort is normal  No respiratory distress  Skin:     Findings: No erythema or rash  Neurological:      Mental Status: She is alert and oriented to person, place, and time  Psychiatric:         Mood and Affect: Mood normal          Behavior: Behavior normal          Please don't hesitate to contact our office with any concerns or questions    -Yuliana Santos MD

## 2023-02-17 NOTE — PROGRESS NOTES
[address]: Ms Sid Castelan was seen today for anatomic survey and cervical length screening ultrasound  See ultrasound report under "OB Procedures" tab  Review of Systems   Constitutional: Negative for chills, fever and unexpected weight change  HENT: Negative for congestion, dental problem, facial swelling and sore throat  Eyes: Negative for visual disturbance  Respiratory: Negative for cough and shortness of breath  Cardiovascular: Negative for chest pain and palpitations  Gastrointestinal: Negative for diarrhea and vomiting  Endocrine: Negative for polydipsia  Genitourinary: Negative for dysuria and vaginal bleeding  Musculoskeletal: Positive for back pain  Negative for joint swelling  Skin: Negative for rash and wound  Allergic/Immunologic: Negative for immunocompromised state  Neurological: Negative for seizures and headaches  Hematological: Does not bruise/bleed easily  Psychiatric/Behavioral: Negative for dysphoric mood, hallucinations and suicidal ideas  Physical Exam  Constitutional:       General: She is not in acute distress  Appearance: Normal appearance  HENT:      Head: Normocephalic and atraumatic  Eyes:      Extraocular Movements: Extraocular movements intact  Cardiovascular:      Rate and Rhythm: Normal rate  Pulmonary:      Effort: Pulmonary effort is normal  No respiratory distress  Skin:     Findings: No erythema or rash  Neurological:      Mental Status: She is alert and oriented to person, place, and time  Psychiatric:         Mood and Affect: Mood normal          Behavior: Behavior normal          Please don't hesitate to contact our office with any concerns or questions    -Jessie Stein MD

## 2023-02-17 NOTE — PROGRESS NOTES
Ultrasound Probe Disinfection    A transvaginal ultrasound was performed  Prior to use, disinfection was performed with High Level Disinfection Process (Trophon)  Probe serial number B3: [id number] was used        Amy Swann  02/17/23  1:35 PM

## 2023-02-22 ENCOUNTER — TELEPHONE (OUTPATIENT)
Dept: OBGYN CLINIC | Facility: CLINIC | Age: 28
End: 2023-02-22

## 2023-02-22 NOTE — TELEPHONE ENCOUNTER
Patient called back    She reports that she was wondering if it is safe to have any sexual activity all due to her placenta previa  She specifically is asking about any method of climax for herself  Reviewed with patient that a placenta previa means the placenta is covering at least a portion of the cervix  If she were to have penetrative sexual activity of any kind that could cause bleeding by the manipulation of the cervix it self vs if she achieved orgasm without penetrative sex this causes contractions and could also cause bleeding from the placenta    I advised she abstain from all sexual activity until her repeat ultrasound to assure resolution of her previa    Pt verbalizes understanding

## 2023-03-16 ENCOUNTER — ROUTINE PRENATAL (OUTPATIENT)
Dept: OBGYN CLINIC | Facility: CLINIC | Age: 28
End: 2023-03-16

## 2023-03-16 VITALS — BODY MASS INDEX: 34.78 KG/M2 | DIASTOLIC BLOOD PRESSURE: 60 MMHG | SYSTOLIC BLOOD PRESSURE: 100 MMHG | WEIGHT: 209 LBS

## 2023-03-16 DIAGNOSIS — O99.210 MATERNAL OBESITY, ANTEPARTUM: ICD-10-CM

## 2023-03-16 DIAGNOSIS — O44.02 PLACENTA PREVIA IN SECOND TRIMESTER: ICD-10-CM

## 2023-03-16 DIAGNOSIS — Z3A.28 28 WEEKS GESTATION OF PREGNANCY: Primary | ICD-10-CM

## 2023-03-16 NOTE — PROGRESS NOTES
31 yo  @ 24w 0d  Denies VB, pain, contx, +fm   /60,  weight stable 209, increased 32 lbs since beginning of pregnancy  Discussed limiting simple sugars in food and drinks      Pregnancy complicated by   posterior placenta previa noted 23, discussed change in position as uterus grows      PN labs, elevated fasting glucose, and glucose in urine, 1 hr gtt normal  PIH labs normal done after one elevated BP, will continue to monitor, on ASA until 36 weeks  Continues on Remeron for sleep which was reviewed with MFM as well    MFM appt 23  PTL/FKC reviewed  28 wk lab script given and reviewed, A pos  RTO in 4 weeks

## 2023-03-25 PROBLEM — E86.0 DEHYDRATION: Status: RESOLVED | Noted: 2023-01-24 | Resolved: 2023-03-25

## 2023-04-07 ENCOUNTER — APPOINTMENT (OUTPATIENT)
Dept: LAB | Age: 28
End: 2023-04-07

## 2023-04-07 DIAGNOSIS — Z3A.28 28 WEEKS GESTATION OF PREGNANCY: ICD-10-CM

## 2023-04-07 LAB
BASOPHILS # BLD AUTO: 0.05 THOUSANDS/ÂΜL (ref 0–0.1)
BASOPHILS NFR BLD AUTO: 0 % (ref 0–1)
EOSINOPHIL # BLD AUTO: 0.08 THOUSAND/ÂΜL (ref 0–0.61)
EOSINOPHIL NFR BLD AUTO: 1 % (ref 0–6)
ERYTHROCYTE [DISTWIDTH] IN BLOOD BY AUTOMATED COUNT: 12.9 % (ref 11.6–15.1)
GLUCOSE 1H P 50 G GLC PO SERPL-MCNC: 140 MG/DL (ref 40–134)
HCT VFR BLD AUTO: 38.3 % (ref 34.8–46.1)
HGB BLD-MCNC: 12.3 G/DL (ref 11.5–15.4)
IMM GRANULOCYTES # BLD AUTO: 0.13 THOUSAND/UL (ref 0–0.2)
IMM GRANULOCYTES NFR BLD AUTO: 1 % (ref 0–2)
LYMPHOCYTES # BLD AUTO: 1.22 THOUSANDS/ÂΜL (ref 0.6–4.47)
LYMPHOCYTES NFR BLD AUTO: 9 % (ref 14–44)
MCH RBC QN AUTO: 28.6 PG (ref 26.8–34.3)
MCHC RBC AUTO-ENTMCNC: 32.1 G/DL (ref 31.4–37.4)
MCV RBC AUTO: 89 FL (ref 82–98)
MONOCYTES # BLD AUTO: 0.59 THOUSAND/ÂΜL (ref 0.17–1.22)
MONOCYTES NFR BLD AUTO: 5 % (ref 4–12)
NEUTROPHILS # BLD AUTO: 11.05 THOUSANDS/ÂΜL (ref 1.85–7.62)
NEUTS SEG NFR BLD AUTO: 84 % (ref 43–75)
NRBC BLD AUTO-RTO: 0 /100 WBCS
PLATELET # BLD AUTO: 335 THOUSANDS/UL (ref 149–390)
PMV BLD AUTO: 10.1 FL (ref 8.9–12.7)
RBC # BLD AUTO: 4.3 MILLION/UL (ref 3.81–5.12)
WBC # BLD AUTO: 13.12 THOUSAND/UL (ref 4.31–10.16)

## 2023-04-08 LAB — TREPONEMA PALLIDUM IGG+IGM AB [PRESENCE] IN SERUM OR PLASMA BY IMMUNOASSAY: NORMAL

## 2023-04-10 DIAGNOSIS — R73.09 ABNORMAL GTT (GLUCOSE TOLERANCE TEST): Primary | ICD-10-CM

## 2023-04-13 PROBLEM — O26.03: Status: ACTIVE | Noted: 2023-04-13

## 2023-04-13 PROBLEM — O99.343 DEPRESSION AFFECTING PREGNANCY IN THIRD TRIMESTER, ANTEPARTUM: Status: ACTIVE | Noted: 2023-01-24

## 2023-04-13 PROBLEM — O26.893 PREGNANCY RELATED BACK PAIN, ANTEPARTUM, THIRD TRIMESTER: Status: ACTIVE | Noted: 2023-04-13

## 2023-04-13 PROBLEM — M54.9 PREGNANCY RELATED BACK PAIN, ANTEPARTUM, THIRD TRIMESTER: Status: ACTIVE | Noted: 2023-04-13

## 2023-04-13 PROBLEM — O44.03 PLACENTA PREVIA IN THIRD TRIMESTER: Status: ACTIVE | Noted: 2023-02-17

## 2023-04-13 PROBLEM — O99.810 ABNORMAL GLUCOSE TOLERANCE TEST (GTT) DURING PREGNANCY, ANTEPARTUM: Status: ACTIVE | Noted: 2023-04-13

## 2023-04-17 DIAGNOSIS — O24.410 DIET CONTROLLED GESTATIONAL DIABETES MELLITUS (GDM) IN THIRD TRIMESTER: Primary | ICD-10-CM

## 2023-04-27 ENCOUNTER — ROUTINE PRENATAL (OUTPATIENT)
Dept: OBGYN CLINIC | Facility: CLINIC | Age: 28
End: 2023-04-27

## 2023-04-27 VITALS
HEIGHT: 65 IN | DIASTOLIC BLOOD PRESSURE: 70 MMHG | BODY MASS INDEX: 37.65 KG/M2 | SYSTOLIC BLOOD PRESSURE: 110 MMHG | WEIGHT: 226 LBS

## 2023-04-27 DIAGNOSIS — F32.A DEPRESSION AFFECTING PREGNANCY IN THIRD TRIMESTER, ANTEPARTUM: Primary | ICD-10-CM

## 2023-04-27 DIAGNOSIS — O99.343 DEPRESSION AFFECTING PREGNANCY IN THIRD TRIMESTER, ANTEPARTUM: Primary | ICD-10-CM

## 2023-04-27 DIAGNOSIS — O44.03 PLACENTA PREVIA IN THIRD TRIMESTER: ICD-10-CM

## 2023-04-27 DIAGNOSIS — O99.210 MATERNAL OBESITY, ANTEPARTUM: ICD-10-CM

## 2023-04-27 NOTE — PROGRESS NOTES
31 yo  @ 30w 0d  Denies VB, pain, contx, +fm   /70  Weight stable  She is doing better decreasing simple sugars  She has B/L, symmetrical,  non pitting pedal edema, worse at end of the day  She has noticed improvement with elevating legs and increasing fluid intake  Advised to notify the office if one leg is red, painful, swollen       Pregnancy complicated by   posterior placenta previa noted 23      PN labs, elevated fasting glucose, and glucose in urine, 1 hr gtt normal, 3 hr normal  PIH labs normal done after one elevated BP, will continue to monitor, on ASA until 36 weeks    MFM appt 23  PTL/FKC reviewed    RTO in 2 weeks

## 2023-05-02 ENCOUNTER — TELEPHONE (OUTPATIENT)
Dept: PERINATAL CARE | Facility: CLINIC | Age: 28
End: 2023-05-02

## 2023-05-02 NOTE — TELEPHONE ENCOUNTER
Left patient a message that her MFM appointment had to be rescheduled  The new time, date and location were provided - 5/12/23  3:00  [place]  The patient has been instructed to please call us back at [phone] with any questions or concerns

## 2023-05-03 DIAGNOSIS — F41.9 ANXIETY: ICD-10-CM

## 2023-05-03 DIAGNOSIS — G47.00 INSOMNIA, UNSPECIFIED TYPE: ICD-10-CM

## 2023-05-04 RX ORDER — MIRTAZAPINE 30 MG/1
TABLET, FILM COATED ORAL
Qty: 90 TABLET | Refills: 0 | Status: SHIPPED | OUTPATIENT
Start: 2023-05-04

## 2023-05-11 ENCOUNTER — ROUTINE PRENATAL (OUTPATIENT)
Dept: OBGYN CLINIC | Facility: CLINIC | Age: 28
End: 2023-05-11

## 2023-05-11 VITALS
HEIGHT: 65 IN | DIASTOLIC BLOOD PRESSURE: 80 MMHG | WEIGHT: 230 LBS | BODY MASS INDEX: 38.32 KG/M2 | SYSTOLIC BLOOD PRESSURE: 110 MMHG

## 2023-05-11 DIAGNOSIS — O44.03 PLACENTA PREVIA IN THIRD TRIMESTER: Primary | ICD-10-CM

## 2023-05-11 DIAGNOSIS — O99.343 DEPRESSION AFFECTING PREGNANCY IN THIRD TRIMESTER, ANTEPARTUM: ICD-10-CM

## 2023-05-11 DIAGNOSIS — F32.A DEPRESSION AFFECTING PREGNANCY IN THIRD TRIMESTER, ANTEPARTUM: ICD-10-CM

## 2023-05-11 DIAGNOSIS — Z3A.32 32 WEEKS GESTATION OF PREGNANCY: ICD-10-CM

## 2023-05-11 DIAGNOSIS — Z23 NEED FOR TDAP VACCINATION: Primary | ICD-10-CM

## 2023-05-11 DIAGNOSIS — O99.210 MATERNAL OBESITY, ANTEPARTUM: ICD-10-CM

## 2023-05-11 DIAGNOSIS — O26.03 EXCESSIVE WEIGHT GAIN DURING PREGNANCY, ANTEPARTUM, THIRD TRIMESTER: ICD-10-CM

## 2023-05-11 NOTE — PROGRESS NOTES
Pt is a 32 y o   32w0d  Pregnancy is complicated by anxiety (taking Remeron); obesity, migraines, abnormal 1 hour GTT with normal 3 hour gtt, posterior placenta previa, excessive weight gain in pregnancy  Has u/s tomorrow for placental location  Pt reports +FM  Denies vb, lof, ctx  PTL precautions and fkc reviewed  TDAP administered today  3d trimester folder reviewed and given  Delivery consent reviewed and signed  F/u 2 weeks

## 2023-05-12 ENCOUNTER — ULTRASOUND (OUTPATIENT)
Dept: PERINATAL CARE | Facility: CLINIC | Age: 28
End: 2023-05-12

## 2023-05-12 VITALS
SYSTOLIC BLOOD PRESSURE: 128 MMHG | WEIGHT: 229.8 LBS | HEIGHT: 65 IN | HEART RATE: 84 BPM | DIASTOLIC BLOOD PRESSURE: 88 MMHG | BODY MASS INDEX: 38.29 KG/M2 | OXYGEN SATURATION: 99 %

## 2023-05-12 DIAGNOSIS — O44.03 PLACENTA PREVIA IN THIRD TRIMESTER: ICD-10-CM

## 2023-05-12 DIAGNOSIS — Z36.89 ENCOUNTER FOR ULTRASOUND TO CHECK FETAL GROWTH: Primary | ICD-10-CM

## 2023-05-12 DIAGNOSIS — O99.210 MATERNAL OBESITY, ANTEPARTUM: ICD-10-CM

## 2023-05-12 DIAGNOSIS — Z36.2 ENCOUNTER FOR FOLLOW-UP ULTRASOUND OF FETAL ANATOMY: ICD-10-CM

## 2023-05-12 DIAGNOSIS — O26.03 EXCESSIVE WEIGHT GAIN DURING PREGNANCY, ANTEPARTUM, THIRD TRIMESTER: ICD-10-CM

## 2023-05-12 NOTE — PROGRESS NOTES
Ultrasound Probe Disinfection    A transvaginal ultrasound was performed  Prior to use, disinfection was performed with High Level Disinfection Process (Trophon)  Probe serial number B2: [id number] was used        Autumn Krishnamurthy  05/12/23  2:47 PM

## 2023-05-12 NOTE — PROGRESS NOTES
"[address]: Ms Maria Luisa Mendoza was seen today for followup placental location ultrasound with fetal growth measurement  See ultrasound report under \"OB Procedures\" tab  The time spent on this established patient on the encounter date included 5 minutes previsit service time reviewing records and precharting, 10 minutes face-to-face service time counseling regarding results and coordinating care, and  5 minutes charting, totalling 20 minutes  Please don't hesitate to contact our office with any concerns or questions    Gurpreet Manley MD      "

## 2023-05-12 NOTE — PATIENT INSTRUCTIONS
Thank you for choosing us for your  care today  If you have any questions about your ultrasound or care, please do not hesitate to contact us or your primary obstetrician  Some general instructions for your pregnancy are:    Exercise: Aim for 22 minutes per day (150 minutes per week) of regular exercise  Walking is great! Nutrition: Choose healthy sources of calcium, iron, and protein  Learn about Preeclampsia: preeclampsia is a common, serious high blood pressure complication in pregnancy  A blood pressure of 122AESG (systolic or top number) or 30YINY (diastolic or bottom number) is not normal and needs evaluation by your doctor  Aspirin is sometimes prescribed in early pregnancy to prevent preeclampsia in women with risk factors - ask your obstetrician if you should be on this medication  For more resources, visit:  [employer]  If you smoke, try to reduce how many cigarettes you smoke or try to quit completely  Do not vape  Other warning signs to watch out for in pregnancy or postpartum: chest pain, obstructed breathing or shortness of breath, seizures, thoughts of hurting yourself or your baby, bleeding, a painful or swollen leg, fever, or headache (see [employer] POST-BIRTH Warning Signs campaign)  If these happen call 911  Itching is also not normal in pregnancy and if you experience this, especially over your hands and feet, potentially worse at night, notify your doctors

## 2023-05-16 NOTE — PROGRESS NOTES
PT Evaluation     Today's date: 2023  Patient name: Thanh Monge  : 1995  MRN: [id number]  Referring provider: Precious Rizo MD  Dx:   Encounter Diagnosis     ICD-10-CM    1  Back pain affecting pregnancy in third trimester  O99 891     M54 9       2  Pregnancy related hip pain in third trimester, antepartum  O26 893     M25 559       3  Round ligament pain  N94 9           Start Time: 1084  Stop Time: 1000  Total time in clinic (min): 55 minutes    Assessment  Assessment details: Thanh Monge is a 32 y o  female who presents with concerns of lumbar and hip pain in third trimester of pregnancy  This is her first pregnancy and she requested an order for physical therapy from her OB/GYN because her pain is, at times, intolerable  Examination reveals postural faults, muscle weakness and diminished knowledge of protective strategies for the lumbopelvic region  She is an excellent candidate for PT and did well today with the postural education and therapeutic exercises introduced  The plan of care was discussed and included education regarding pelvic floor anatomy, explanation of exam technique, explanation of exam findings and discussion of treatment plan as well as the importance of patient compliance and adherence to physical therapy visits  Patient would benefit from skilled physical therapy services  to address deficits and ultimately meet goal of independent self management of condition         Impairments: abnormal gait, activity intolerance, impaired balance, impaired physical strength, lacks appropriate home exercise program, pain with function, weight-bearing intolerance, poor posture  and poor body mechanics    Plan  Patient would benefit from: skilled physical therapy  Referral necessary: No  Planned therapy interventions: manual therapy, postural training, patient education, stretching, therapeutic activities, therapeutic exercise, behavior modification and breathing "training  Frequency: 1x week  Duration in weeks: 9  Plan of Care beginning date: 2023  Plan of Care expiration date: 2023  Treatment plan discussed with: patient        PT Pelvic Floor Subjective:   History of Present Illness:   Patient is a  with JOSE A of 23  Who reports that she requested to have physical therapy for groin and back pain in pregnancy  Sharp L>R and central groin pain with transitions  Support belt with walking but she feels as though it is not super beneficial  Concern about placenta previa has resolved as per recent ultrasound  Currently seeing OB every 2 weeks  Recurrent probem    Quality of life: good    Social Support:     Lives in:  Multiple-level home    Lives with:  Significant other    Work status: employed full time (sales over the phone)  Diet and Exercise:      Yosef marquez - \"I don't know what to do  \"  OB/ gyn History    Gestational History:     Prior Pregnancy: No    Bladder Function:     Voiding Difficulties comments:     Voiding frequency: every 1-2 hours    Urinary leakage: no urine leakage    Nocturia (episodes per night): 2    Painful urination: No      Intake (ounces):      Intake (ounces) comment: 3 water bottles, apple juice, iced tea, 1 cup coffee, bottle of soda with lunch  Bowel Function:     Bowel frequency: daily    [place] Stool Scale: type 4 and type 5  Sexual Function:     Sexually Active:  Not sexually active  Pain:     Current pain ratin    At best pain ratin    At worst pain rating:  10    Location:  Groin and lower back    Quality:  Sharp    Aggravating factors:  Prolonged positions, sit to stand transition and walking    Relieving factors:  Change in position (warm bath; icy hot)    Progression:  No change  Diagnostic Tests:     None    Patient Goals:     Patient goals for therapy:  Improved pain management, improved quality of life, improved comfort and decreased pain      Objective     Static Posture " "    Head  Forward  Shoulders  Rounded  Lumbar Spine   Increased lordosis  Knee   Genu valgus  Ankle/Foot   Ankle/Foot (Left): Pes planus  Ankle/Foot (Right): Pes planus       General Comments:      Hip Comments   Unable to perform single leg stance on right due to pain and weakness; did not improve with PFM engagment in standing  No relief with SIJ or PS compression in standing  Postural correction (PPT with scap retraction ) did not reduce pain in hips and back  Bed transfers - pain reduced with knees together, rely on arms for weight bearing  Bed rolling - pain reduced with knees together and PFM contraction    Pelvic Floor Exam   Abdominal assessment: Low lying uterus for 32 weeks gestation  Postural correction introduced but limited by patient pain complaints    General Perineum Exam:     General perineum exam comments: Deferred on evaluation due to time constraints but may prove beneficial in future               Precautions:   Patient Active Problem List   Diagnosis   • Intractable cyclical vomiting with nausea   • Prolonged QT interval syndrome   • Anxiety   • Uses birth control   • Leukocytosis   • Marijuana use   • Depression affecting pregnancy in third trimester, antepartum   • Medication exposure during first trimester of pregnancy   • Vomiting during pregnancy   • Encounter for supervision of normal first pregnancy in second trimester   • Maternal obesity, antepartum   • 32 weeks gestation of pregnancy   • Abnormal glucose tolerance test (GTT) during pregnancy, antepartum   • Excessive weight gain during pregnancy, antepartum, third trimester   • Pregnancy related back pain, antepartum, third trimester           Manuals 5/17            kinesiotape nv **                                                  Neuro Re-Ed             Posture ed for bed mobility Knees together x8'                                                                                          Ther Ex             PPT 5\"x10  " "          Knee fallouts x10 each            Cat/cow 10x10\"            dallas pose Pillow under buttocks                                                                Ther Activity             education anatomy and POC x10'                         Gait Training                                       Modalities                                          "

## 2023-05-17 ENCOUNTER — EVALUATION (OUTPATIENT)
Dept: PHYSICAL THERAPY | Facility: REHABILITATION | Age: 28
End: 2023-05-17

## 2023-05-17 DIAGNOSIS — O99.891 BACK PAIN AFFECTING PREGNANCY IN THIRD TRIMESTER: Primary | ICD-10-CM

## 2023-05-17 DIAGNOSIS — M54.9 BACK PAIN AFFECTING PREGNANCY IN THIRD TRIMESTER: Primary | ICD-10-CM

## 2023-05-17 DIAGNOSIS — N94.9 ROUND LIGAMENT PAIN: ICD-10-CM

## 2023-05-17 DIAGNOSIS — M25.559 PREGNANCY RELATED HIP PAIN IN THIRD TRIMESTER, ANTEPARTUM: ICD-10-CM

## 2023-05-17 DIAGNOSIS — O26.893 PREGNANCY RELATED HIP PAIN IN THIRD TRIMESTER, ANTEPARTUM: ICD-10-CM

## 2023-05-22 ENCOUNTER — OFFICE VISIT (OUTPATIENT)
Dept: PHYSICAL THERAPY | Facility: REHABILITATION | Age: 28
End: 2023-05-22

## 2023-05-22 ENCOUNTER — NURSE TRIAGE (OUTPATIENT)
Dept: OTHER | Facility: OTHER | Age: 28
End: 2023-05-22

## 2023-05-22 DIAGNOSIS — O99.891 BACK PAIN AFFECTING PREGNANCY IN THIRD TRIMESTER: Primary | ICD-10-CM

## 2023-05-22 DIAGNOSIS — N94.9 ROUND LIGAMENT PAIN: ICD-10-CM

## 2023-05-22 DIAGNOSIS — M25.559 PREGNANCY RELATED HIP PAIN IN THIRD TRIMESTER, ANTEPARTUM: ICD-10-CM

## 2023-05-22 DIAGNOSIS — O26.893 PREGNANCY RELATED HIP PAIN IN THIRD TRIMESTER, ANTEPARTUM: ICD-10-CM

## 2023-05-22 DIAGNOSIS — M54.9 BACK PAIN AFFECTING PREGNANCY IN THIRD TRIMESTER: Primary | ICD-10-CM

## 2023-05-22 NOTE — PROGRESS NOTES
"Daily Note     Today's date: 2023  Patient name: Andrade Burgess  : 1995  MRN: [id number]  Referring provider: Cyndee Marshall MD  Dx:   Encounter Diagnosis     ICD-10-CM    1  Back pain affecting pregnancy in third trimester  O99 891     M54 9       2  Pregnancy related hip pain in third trimester, antepartum  O26 893     M25 559       3  Round ligament pain  N94 9           Start Time: 7556  Stop Time: 2487  Total time in clinic (min): 45 minutes    Subjective: Pt reports having to call off work due to pain level, feeling limited and also her feet and legs are \"very swollen\"  Objective: See treatment diary below    BP L UE seated 120/82    Figure 8  R foot 23 1/2 inches  L foot 22 1/2 inches    Assessment: Tolerated treatment well  Patient would benefit from continued PT  Started session with MFR of l/s paraspinals with the pregnancy pillow in addition to gentle stretching of hips  Good tolerance  Discussed she may benefit from longer compression socks and also to avoid wearing tight yoga pants (as she has indented lines around her ankles)  Measurements were taken for reference, R side more swollen than L  Also encouraged to elevate and to perform ankle pumps  K-tape applied (2 pieces on each side in a spider formation in addition to a whole piece across lower abdomen area) were applied  Pt made aware of possible reactions and to remove if any  Responds well to session with decreased symptoms and improve ease with transfers as per patient  Plan: Continue per plan of care        Precautions:   Patient Active Problem List   Diagnosis   • Intractable cyclical vomiting with nausea   • Prolonged QT interval syndrome   • Anxiety   • Uses birth control   • Leukocytosis   • Marijuana use   • Depression affecting pregnancy in third trimester, antepartum   • Medication exposure during first trimester of pregnancy   • Vomiting during pregnancy   • Encounter for supervision of normal first pregnancy " "in second trimester   • Maternal obesity, antepartum   • 32 weeks gestation of pregnancy   • Abnormal glucose tolerance test (GTT) during pregnancy, antepartum   • Excessive weight gain during pregnancy, antepartum, third trimester   • Pregnancy related back pain, antepartum, third trimester           Manuals 5/17 5/22           BP  120/82           kinesiotape nv 12'           Prone pregnancy pillow cupping & hips  25'           Ankle measurements   3'                        Neuro Re-Ed             Posture ed for bed mobility Knees together x8'                                                                                          Ther Ex             PPT 5\"x10            Knee fallouts x10 each            Cat/cow 10x10\"            dallas pose Pillow under buttocks                                                                Ther Activity             education anatomy and POC x10'                         Gait Training                                       Modalities                                            "

## 2023-05-22 NOTE — TELEPHONE ENCOUNTER
"  Reason for Disposition  • Patient sounds very sick or weak to the triager    Answer Assessment - Initial Assessment Questions  1  ONSET: \"When did the swelling start? \" (e g , minutes, hours, days)       Swelling in both legs but right leg more than the other     Sharp pain in right leg right leg more swollen entire leg cramped up    Seeing stars /visual problems   Denies LOF bleeding or ctx's    Positive FM    2  LOCATION: \"What part of the hand is swollen? \"  \"Are both hands swollen or just one hand? \"        Both hands swollen     3  SEVERITY: \"How bad is the swelling? \" (e g , localized; mild, moderate, severe)    - BALL OR LUMP: small ball or lump    - LOCALIZED: puffy or swollen area or patch of skin    - JOINT SWELLING: swelling of a joint    - MILD: puffiness or mild swelling of fingers or hand    - MODERATE: fingers and hand are swollen    - SEVERE: swelling of entire hand and up into forearm         4  REDNESS: \"Does the swelling look red or infected? \"        Denies    5  PAIN: \"Is the swelling painful to touch? \" If Yes, ask: \"How painful is it? \"   (Scale 1-10; mild, moderate or severe)        Hurts in general     6  FEVER: \"Do you have a fever? \" If Yes, ask: \"What is it, how was it measured, and when did it start? \"        Denies    7  CAUSE: \"What do you think is causing the hand swelling? \" (e g , heat, insect bite, pregnancy, recent injury)       Yes pregnant    8  MEDICAL HISTORY: \"Do you have a history of heart failure, kidney disease, liver failure, or cancer? \"        Denies    9  RECURRENT SYMPTOM: \"Have you had hand swelling before? \" If Yes, ask: \"When was the last time? \" \"What happened that time? \"      Denies    10  OTHER SYMPTOMS: \"Do you have any other symptoms? \" (e g , blurred vision, difficulty breathing, headache)         Positive for burred vision denies headache    11  PREGNANCY: \"Is there any chance you are pregnant? \" \"When was your last menstrual period? \"          33 weeks    Protocols used: " HAND [facility]

## 2023-05-22 NOTE — TELEPHONE ENCOUNTER
Patient is 33 weeks with swelling in both legs but pain and swelling more severe in right leg  Denies SOB, chest pain, headaches, but complains of seeing stars a few episodes this weekend  TT sent to on call  Per on call okay to observe symptoms  Call central schedule in AM to schedule a doppler of  lower legs

## 2023-05-22 NOTE — TELEPHONE ENCOUNTER
"Regardin wks pregnant/ swelling on feet, ankles, legs  ----- Message from Kaity Bañuelos sent at 2023 12:31 AM EDT -----  Pt called, \" I am 33 weeks pregnant and I am concerned regarding selling on my feet, ankles and legs  \"    "

## 2023-05-26 ENCOUNTER — TELEPHONE (OUTPATIENT)
Facility: HOSPITAL | Age: 28
End: 2023-05-26

## 2023-05-26 ENCOUNTER — ROUTINE PRENATAL (OUTPATIENT)
Dept: OBGYN CLINIC | Facility: CLINIC | Age: 28
End: 2023-05-26

## 2023-05-26 VITALS
DIASTOLIC BLOOD PRESSURE: 80 MMHG | HEART RATE: 115 BPM | WEIGHT: 233.6 LBS | BODY MASS INDEX: 38.92 KG/M2 | HEIGHT: 65 IN | SYSTOLIC BLOOD PRESSURE: 124 MMHG

## 2023-05-26 DIAGNOSIS — O99.343 DEPRESSION AFFECTING PREGNANCY IN THIRD TRIMESTER, ANTEPARTUM: Primary | ICD-10-CM

## 2023-05-26 DIAGNOSIS — O26.893 PREGNANCY RELATED BACK PAIN, ANTEPARTUM, THIRD TRIMESTER: ICD-10-CM

## 2023-05-26 DIAGNOSIS — O26.03 EXCESSIVE WEIGHT GAIN DURING PREGNANCY, ANTEPARTUM, THIRD TRIMESTER: ICD-10-CM

## 2023-05-26 DIAGNOSIS — O99.810 ABNORMAL GLUCOSE TOLERANCE TEST (GTT) DURING PREGNANCY, ANTEPARTUM: ICD-10-CM

## 2023-05-26 DIAGNOSIS — F32.A DEPRESSION AFFECTING PREGNANCY IN THIRD TRIMESTER, ANTEPARTUM: Primary | ICD-10-CM

## 2023-05-26 DIAGNOSIS — M54.9 PREGNANCY RELATED BACK PAIN, ANTEPARTUM, THIRD TRIMESTER: ICD-10-CM

## 2023-05-26 DIAGNOSIS — Z3A.34 34 WEEKS GESTATION OF PREGNANCY: ICD-10-CM

## 2023-05-26 DIAGNOSIS — O99.210 MATERNAL OBESITY, ANTEPARTUM: ICD-10-CM

## 2023-05-26 RX ORDER — AMOXICILLIN 500 MG/1
CAPSULE ORAL
COMMUNITY
Start: 2023-05-16

## 2023-05-26 NOTE — PROGRESS NOTES
Pt is a 25 y o  [place] Needs 34w1d  Pregnancy is complicated by  anxiety (taking Remeron); obesity, migraines, abnormal 1 hour GTT with normal 3 hour gtt, posterior placenta previa (now resolved), excessive weight gain in pregnancy  Pt reports +FM  Denies vb, lof, ctx  PTL precautions and fkc reviewed  GBS next visit  F/u 2 weeks

## 2023-05-26 NOTE — TELEPHONE ENCOUNTER
LVM for PT to r/s 6/20 U/S she cancelled via [address]  Gave office number for PT to call back

## 2023-06-01 ENCOUNTER — OFFICE VISIT (OUTPATIENT)
Dept: PHYSICAL THERAPY | Facility: REHABILITATION | Age: 28
End: 2023-06-01

## 2023-06-01 DIAGNOSIS — M54.9 PREGNANCY RELATED BACK PAIN, ANTEPARTUM, THIRD TRIMESTER: ICD-10-CM

## 2023-06-01 DIAGNOSIS — O99.891 BACK PAIN AFFECTING PREGNANCY IN THIRD TRIMESTER: Primary | ICD-10-CM

## 2023-06-01 DIAGNOSIS — M25.559 PREGNANCY RELATED HIP PAIN IN THIRD TRIMESTER, ANTEPARTUM: ICD-10-CM

## 2023-06-01 DIAGNOSIS — M54.9 BACK PAIN AFFECTING PREGNANCY IN THIRD TRIMESTER: Primary | ICD-10-CM

## 2023-06-01 DIAGNOSIS — N94.9 ROUND LIGAMENT PAIN: ICD-10-CM

## 2023-06-01 DIAGNOSIS — O26.893 PREGNANCY RELATED BACK PAIN, ANTEPARTUM, THIRD TRIMESTER: ICD-10-CM

## 2023-06-01 DIAGNOSIS — O26.893 PREGNANCY RELATED HIP PAIN IN THIRD TRIMESTER, ANTEPARTUM: ICD-10-CM

## 2023-06-01 NOTE — PROGRESS NOTES
"Daily Note     Today's date: 2023  Patient name: Elena Abad  : 1995  MRN: [id number]  Referring provider: Kobe Lino MD  Dx:   Encounter Diagnosis     ICD-10-CM    1  Back pain affecting pregnancy in third trimester  O99 891     M54 9       2  Pregnancy related hip pain in third trimester, antepartum  O26 893     M25 559       3  Round ligament pain  N94 9                      Subjective: Pt removed tape after a day due to itching  Was hard to remove  Did give pain relief  Rates pain 8/10  \"I think the baby dropped and my groin is hurting really bad  \"    Objective: See treatment diary below    23  Figure 8  R foot 23 1/2 inches  L foot 22 1/2 inches    Assessment: Tolerated treatment well  Patient would benefit from continued PT  Continues with swelling - BP wnl  States OB is aware  Stretch marks along lower back, lateral hips, calves  Did well with retro LE massage  Advised to elevated legs and do ankle pumps  Faxed [facility] paperwork to employer  Plan: Continue per plan of care        Precautions:   Patient Active Problem List   Diagnosis   • Intractable cyclical vomiting with nausea   • Prolonged QT interval syndrome   • Anxiety   • Uses birth control   • Leukocytosis   • Marijuana use   • Depression affecting pregnancy in third trimester, antepartum   • Medication exposure during first trimester of pregnancy   • Vomiting during pregnancy   • Encounter for supervision of normal first pregnancy in second trimester   • Maternal obesity, antepartum   • 32 weeks gestation of pregnancy   • Abnormal glucose tolerance test (GTT) during pregnancy, antepartum   • Excessive weight gain during pregnancy, antepartum, third trimester   • Pregnancy related back pain, antepartum, third trimester           Manuals           BP  120/82 122/84 L arm seated          kinesiotape nv 12'           Prone pregnancy pillow cupping & hips  25' STM lumbar + hip AROM          Ankle measurements   3'   " "        Retro massage both LEs   LG          Neuro Re-Ed             Posture ed for bed mobility Knees together x8'                                                                                          Ther Ex             PPT 5\"x10            Knee fallouts x10 each            Cat/cow 10x10\"            dallas pose Pillow under buttocks            Ankle pumps   seated          HS stretch   seated                                    Ther Activity             education anatomy and POC x10'                         Gait Training                                       Modalities                                            "

## 2023-06-06 ENCOUNTER — OFFICE VISIT (OUTPATIENT)
Dept: PHYSICAL THERAPY | Facility: REHABILITATION | Age: 28
End: 2023-06-06
Payer: COMMERCIAL

## 2023-06-06 DIAGNOSIS — O26.893 PREGNANCY RELATED BACK PAIN, ANTEPARTUM, THIRD TRIMESTER: ICD-10-CM

## 2023-06-06 DIAGNOSIS — O99.891 BACK PAIN AFFECTING PREGNANCY IN THIRD TRIMESTER: Primary | ICD-10-CM

## 2023-06-06 DIAGNOSIS — M54.9 BACK PAIN AFFECTING PREGNANCY IN THIRD TRIMESTER: Primary | ICD-10-CM

## 2023-06-06 DIAGNOSIS — M54.9 PREGNANCY RELATED BACK PAIN, ANTEPARTUM, THIRD TRIMESTER: ICD-10-CM

## 2023-06-06 DIAGNOSIS — O26.893 PREGNANCY RELATED HIP PAIN IN THIRD TRIMESTER, ANTEPARTUM: ICD-10-CM

## 2023-06-06 DIAGNOSIS — N94.9 ROUND LIGAMENT PAIN: ICD-10-CM

## 2023-06-06 DIAGNOSIS — M25.559 PREGNANCY RELATED HIP PAIN IN THIRD TRIMESTER, ANTEPARTUM: ICD-10-CM

## 2023-06-06 PROCEDURE — 97110 THERAPEUTIC EXERCISES: CPT

## 2023-06-06 PROCEDURE — 97140 MANUAL THERAPY 1/> REGIONS: CPT

## 2023-06-06 NOTE — PROGRESS NOTES
Daily Note   Today's date: 2023  Patient name: Harsha Ornelas  : 1995  MRN: [id number]  Referring provider: Vicente Cox MD  Dx:   Encounter Diagnosis     ICD-10-CM    1  Back pain affecting pregnancy in third trimester  O99 891     M54 9       2  Pregnancy related hip pain in third trimester, antepartum  O26 893     M25 559       3  Round ligament pain  N94 9       4  Pregnancy related back pain, antepartum, third trimester  O26 893     M54 9           Start Time: 1800  Stop Time: 1845  Total time in clinic (min): 45 minutes   Patient is a  with JOSE A of 23  Who reports that she requested to have physical therapy for groin and back pain in pregnancy  Sharp L>R and central groin pain with transitions  Support belt with walking but she feels as though it is not super beneficial  Concern about placenta previa has resolved as per recent ultrasound  Currently seeing OB every 2 weeks  Subjective: Pt reports being in more pain  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, 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  Objective: See treatment diary below    23  Figure 8  R foot 23 1/2 inches  L foot 22 1/2 inches    Assessment: Tolerated treatment well  Patient would benefit from continued PT  Responded well to gentle manual therapy in prone position performed by Cindi Saravia PTA  I also added performed retro massage to b/l calves  Noted swelling in b/l feet R > L  Added exercises on physio ball, pt has one at home and was issued some exercises as HEP  Plan: Continue per plan of care        Precautions:   Patient Active Problem List   Diagnosis   • Intractable cyclical vomiting with nausea   • Prolonged QT interval syndrome   • Anxiety   • Uses birth control   • Leukocytosis   • Marijuana use   • Depression affecting pregnancy in third trimester, antepartum   • Medication exposure during first trimester of pregnancy   • Vomiting during pregnancy   • Encounter for supervision of normal first "pregnancy in second trimester   • Maternal obesity, antepartum   • 32 weeks gestation of pregnancy   • Abnormal glucose tolerance test (GTT) during pregnancy, antepartum   • Excessive weight gain during pregnancy, antepartum, third trimester   • Pregnancy related back pain, antepartum, third trimester           Manuals 5/17 5/22 6/1 6/6         BP  120/82 122/84 L arm seated R /92         kinesiotape nv 12'           Prone pregnancy pillow cupping & hips  25' STM lumbar + hip AROM 25' cupping         Ankle measurements   3'           Retro massage both LEs   LG CRM @ same time         Neuro Re-Ed             Posture ed for bed mobility Knees together x8'                                                                                          Ther Ex             PPT 5\"x10            Knee fallouts x10 each            Cat/cow 10x10\"            dallas pose Pillow under buttocks            Ankle pumps   seated          HS stretch   seated          Pball     pelvic tilts, CW, CCW, stretch at bar 15'                                   Ther Activity             education anatomy and POC x10'                         Gait Training                                       Modalities                                            "

## 2023-06-09 ENCOUNTER — ROUTINE PRENATAL (OUTPATIENT)
Dept: OBGYN CLINIC | Facility: CLINIC | Age: 28
End: 2023-06-09

## 2023-06-09 VITALS
DIASTOLIC BLOOD PRESSURE: 76 MMHG | HEIGHT: 65 IN | SYSTOLIC BLOOD PRESSURE: 128 MMHG | WEIGHT: 238.6 LBS | BODY MASS INDEX: 39.75 KG/M2

## 2023-06-09 DIAGNOSIS — Z3A.36 36 WEEKS GESTATION OF PREGNANCY: ICD-10-CM

## 2023-06-09 DIAGNOSIS — O99.210 MATERNAL OBESITY, ANTEPARTUM: ICD-10-CM

## 2023-06-09 DIAGNOSIS — O26.03 EXCESSIVE WEIGHT GAIN DURING PREGNANCY, ANTEPARTUM, THIRD TRIMESTER: Primary | ICD-10-CM

## 2023-06-09 DIAGNOSIS — O99.343 DEPRESSION AFFECTING PREGNANCY IN THIRD TRIMESTER, ANTEPARTUM: ICD-10-CM

## 2023-06-09 DIAGNOSIS — F32.A DEPRESSION AFFECTING PREGNANCY IN THIRD TRIMESTER, ANTEPARTUM: ICD-10-CM

## 2023-06-09 PROCEDURE — PNV: Performed by: NURSE PRACTITIONER

## 2023-06-09 PROCEDURE — 87150 DNA/RNA AMPLIFIED PROBE: CPT | Performed by: NURSE PRACTITIONER

## 2023-06-09 NOTE — PROGRESS NOTES
Pt is a 25 y o  Sinan Palm 36w1d  Pregnancy is complicated by  anxiety (taking Remeron); obesity, migraines, abnormal 1 hour GTT with normal 3 hour gtt, posterior placenta previa (now resolved), excessive weight gain in pregnancy  Baby is active, denies leakage of fluid, vaginal bleeding  reports occasions where she will have painful cramping approx every 15 minutes  I reviewed getting off her feet, hydrating and monitoring and if UCs continue >4 times an hour to call us    S=D, normal FHTs, normal BP    GBS collected  RV 1w

## 2023-06-12 LAB — GP B STREP DNA SPEC QL NAA+PROBE: NEGATIVE

## 2023-06-13 ENCOUNTER — APPOINTMENT (OUTPATIENT)
Dept: PHYSICAL THERAPY | Facility: REHABILITATION | Age: 28
End: 2023-06-13
Payer: COMMERCIAL

## 2023-06-15 ENCOUNTER — ROUTINE PRENATAL (OUTPATIENT)
Dept: OBGYN CLINIC | Facility: CLINIC | Age: 28
End: 2023-06-15

## 2023-06-15 VITALS — BODY MASS INDEX: 40.07 KG/M2 | SYSTOLIC BLOOD PRESSURE: 126 MMHG | WEIGHT: 240.8 LBS | DIASTOLIC BLOOD PRESSURE: 80 MMHG

## 2023-06-15 DIAGNOSIS — F32.A DEPRESSION AFFECTING PREGNANCY IN THIRD TRIMESTER, ANTEPARTUM: Primary | ICD-10-CM

## 2023-06-15 DIAGNOSIS — Z3A.37 37 WEEKS GESTATION OF PREGNANCY: ICD-10-CM

## 2023-06-15 DIAGNOSIS — O26.893 PREGNANCY RELATED BACK PAIN, ANTEPARTUM, THIRD TRIMESTER: ICD-10-CM

## 2023-06-15 DIAGNOSIS — M54.9 PREGNANCY RELATED BACK PAIN, ANTEPARTUM, THIRD TRIMESTER: ICD-10-CM

## 2023-06-15 DIAGNOSIS — O99.210 MATERNAL OBESITY, ANTEPARTUM: ICD-10-CM

## 2023-06-15 DIAGNOSIS — O99.343 DEPRESSION AFFECTING PREGNANCY IN THIRD TRIMESTER, ANTEPARTUM: Primary | ICD-10-CM

## 2023-06-15 DIAGNOSIS — O26.03 EXCESSIVE WEIGHT GAIN DURING PREGNANCY, ANTEPARTUM, THIRD TRIMESTER: ICD-10-CM

## 2023-06-15 PROCEDURE — PNV: Performed by: OBSTETRICS & GYNECOLOGY

## 2023-06-15 NOTE — PROGRESS NOTES
Pt is a 25 y o  David Maciel 37w0d  Pregnancy is complicated by  anxiety (taking Remeron); obesity, migraines, abnormal 1 hour GTT with normal 3 hour gtt, posterior placenta previa (now resolved), excessive weight gain in pregnancy  Pt reports +FM  Denies vb, lof  Notes  ctx  Labor precautions and fkc reviewed  SVE: 1/thick/-3, anterior, mid consistency  Pt interested in elective IOL  Will plan 7/2 in PM for ripening   Consent reviewed and signed

## 2023-06-15 NOTE — Clinical Note
Pt interested in elective IOL  Planning 7/2 in PM  Please schedule on 6/26 as I will be away  I signed the consent and left it on your desk

## 2023-06-16 ENCOUNTER — ULTRASOUND (OUTPATIENT)
Dept: OBGYN CLINIC | Facility: CLINIC | Age: 28
End: 2023-06-16

## 2023-06-16 DIAGNOSIS — O40.3XX0 POLYHYDRAMNIOS IN THIRD TRIMESTER COMPLICATION, SINGLE OR UNSPECIFIED FETUS: ICD-10-CM

## 2023-06-16 DIAGNOSIS — E66.01 MATERNAL MORBID OBESITY, ANTEPARTUM, THIRD TRIMESTER (HCC): ICD-10-CM

## 2023-06-16 DIAGNOSIS — O99.213 MATERNAL MORBID OBESITY, ANTEPARTUM, THIRD TRIMESTER (HCC): ICD-10-CM

## 2023-06-16 DIAGNOSIS — O99.810 ABNORMAL GLUCOSE TOLERANCE TEST (GTT) DURING PREGNANCY, ANTEPARTUM: ICD-10-CM

## 2023-06-16 DIAGNOSIS — O99.210 MATERNAL OBESITY, ANTEPARTUM: ICD-10-CM

## 2023-06-16 NOTE — PROGRESS NOTES
SV IUP-161 BPM  VERTEX  Fundal II Plac    Movement=8  2  Tone-5   2  Breathing=10+ 2  Fluid-LVP 8 5  2-NAVDEEP-25 8       POLy    Total BPP  8

## 2023-06-20 ENCOUNTER — APPOINTMENT (OUTPATIENT)
Dept: PHYSICAL THERAPY | Facility: REHABILITATION | Age: 28
End: 2023-06-20
Payer: COMMERCIAL

## 2023-06-22 ENCOUNTER — ROUTINE PRENATAL (OUTPATIENT)
Dept: OBGYN CLINIC | Facility: CLINIC | Age: 28
End: 2023-06-22

## 2023-06-22 VITALS
WEIGHT: 244.8 LBS | BODY MASS INDEX: 40.79 KG/M2 | HEIGHT: 65 IN | DIASTOLIC BLOOD PRESSURE: 2 MMHG | SYSTOLIC BLOOD PRESSURE: 118 MMHG

## 2023-06-22 DIAGNOSIS — Z3A.38 38 WEEKS GESTATION OF PREGNANCY: ICD-10-CM

## 2023-06-22 DIAGNOSIS — O99.810 ABNORMAL GLUCOSE TOLERANCE TEST (GTT) DURING PREGNANCY, ANTEPARTUM: ICD-10-CM

## 2023-06-22 DIAGNOSIS — O99.210 MATERNAL OBESITY, ANTEPARTUM: ICD-10-CM

## 2023-06-22 DIAGNOSIS — O99.343 DEPRESSION AFFECTING PREGNANCY IN THIRD TRIMESTER, ANTEPARTUM: Primary | ICD-10-CM

## 2023-06-22 DIAGNOSIS — O26.03 EXCESSIVE WEIGHT GAIN DURING PREGNANCY, ANTEPARTUM, THIRD TRIMESTER: ICD-10-CM

## 2023-06-22 DIAGNOSIS — F32.A DEPRESSION AFFECTING PREGNANCY IN THIRD TRIMESTER, ANTEPARTUM: Primary | ICD-10-CM

## 2023-06-22 PROCEDURE — PNV: Performed by: OBSTETRICS & GYNECOLOGY

## 2023-06-22 NOTE — Clinical Note
Pt decided she would like to change her IOL to 6/30 in the evening for delivery on 7/1 Please call in tomorrow for the same

## 2023-06-22 NOTE — PROGRESS NOTES
"Daily Note   Today's date: 2023  Patient name: Jaquan Flowers  : 1995  MRN: [id number]  Referring provider: Wilberto Bearden MD  Dx:   Encounter Diagnosis     ICD-10-CM    1  Back pain affecting pregnancy in third trimester  O99 891     M54 9           Start Time: 945  Stop Time: 3195  Total time in clinic (min): 45 minutes   Patient is a  with JOSE A of 23  Who reports that she requested to have physical therapy for groin and back pain in pregnancy  Sharp L>R and central groin pain with transitions  Support belt with walking but she feels as though it is not super beneficial  Concern about placenta previa has resolved as per recent ultrasound  Currently seeing OB every 2 weeks  Subjective: Pt is scheduled to be induced next Friday  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, pt's leg was like \"numb\" and it took 20 minutes for it come back  Objective: See treatment diary below    23  Figure 8  R foot 23 1/2 inches  L foot 22 1/2 inches    Assessment: Tolerated treatment well  Patient would benefit from continued PT  Reviewed labor and birth positions  Encouraged to perform deep squats and breathing exercises  Pt is not scheduled for any more visits at this time but encouraged to call us if needs anything prior to having the baby  Plan: Continue per plan of care        Precautions:   Patient Active Problem List   Diagnosis   • Intractable cyclical vomiting with nausea   • Prolonged QT interval syndrome   • Anxiety   • Uses birth control   • Leukocytosis   • Marijuana use   • Depression affecting pregnancy in third trimester, antepartum   • Medication exposure during first trimester of pregnancy   • Vomiting during pregnancy   • Encounter for supervision of normal first pregnancy in second trimester   • Maternal obesity, antepartum   • 32 weeks gestation of pregnancy   • Abnormal glucose tolerance test (GTT) during pregnancy, antepartum   • Excessive weight gain during " "pregnancy, antepartum, third trimester   • Pregnancy related back pain, antepartum, third trimester           Manuals 5/17 5/22 6/1 6/6 6/23        BP  120/82 122/84 L arm seated R /92 126/90        kinesiotape nv 12'           Prone pregnancy pillow cupping & hips  25' STM lumbar + hip AROM 25' cupping gentle s/l stretching & distraction of hip 15'        Ankle measurements   3'           Retro massage both LEs   LG CRM @ same time         Neuro Re-Ed             Posture ed for bed mobility Knees together x8'                                                                                          Ther Ex             PPT 5\"x10            Knee fallouts x10 each            Cat/cow 10x10\"            dallas pose Pillow under buttocks            Ankle pumps   seated          HS stretch   seated          Pball     pelvic tilts, CW, CCW, stretch at bar 15'         Labor & birth positions     21'                     Ther Activity             education anatomy and POC x10'    reviewed perineal massage, labor & birth positins, breathing \"don't hold your breath 10'                     Gait Training                                       Modalities                                            "

## 2023-06-22 NOTE — PROGRESS NOTES
Pt is a 25 y o  Tom Harris 38w0d  Pregnancy is complicated by anxiety (taking Remeron); obesity, migraines, abnormal 1 hour GTT with normal 3 hour gtt, posterior placenta previa (now resolved), excessive weight gain in pregnancy  Pt reports +FM  Denies vb, lof  Notes irregular ctx  Labor precautions  and [facility] reviewed  Pt reports she is getting very tired  Desires to change IOL to 6/30 pm in pm for delivery on 7/1   Will schedule for the same  SVE: 1/thick/-3, mid position, midconsistency  F/u 1 week

## 2023-06-23 ENCOUNTER — OFFICE VISIT (OUTPATIENT)
Dept: PHYSICAL THERAPY | Facility: REHABILITATION | Age: 28
End: 2023-06-23
Payer: COMMERCIAL

## 2023-06-23 ENCOUNTER — ULTRASOUND (OUTPATIENT)
Dept: OBGYN CLINIC | Facility: CLINIC | Age: 28
End: 2023-06-23
Payer: COMMERCIAL

## 2023-06-23 DIAGNOSIS — O99.810 ABNORMAL GLUCOSE TOLERANCE TEST (GTT) DURING PREGNANCY, ANTEPARTUM: ICD-10-CM

## 2023-06-23 DIAGNOSIS — O99.891 BACK PAIN AFFECTING PREGNANCY IN THIRD TRIMESTER: Primary | ICD-10-CM

## 2023-06-23 DIAGNOSIS — O26.03 EXCESSIVE WEIGHT GAIN DURING PREGNANCY, ANTEPARTUM, THIRD TRIMESTER: ICD-10-CM

## 2023-06-23 DIAGNOSIS — M54.9 BACK PAIN AFFECTING PREGNANCY IN THIRD TRIMESTER: Primary | ICD-10-CM

## 2023-06-23 DIAGNOSIS — O99.210 MATERNAL OBESITY, ANTEPARTUM: ICD-10-CM

## 2023-06-23 PROCEDURE — 97530 THERAPEUTIC ACTIVITIES: CPT

## 2023-06-23 PROCEDURE — 97112 NEUROMUSCULAR REEDUCATION: CPT

## 2023-06-23 PROCEDURE — 76815 OB US LIMITED FETUS(S): CPT | Performed by: OBSTETRICS & GYNECOLOGY

## 2023-06-23 PROCEDURE — 97140 MANUAL THERAPY 1/> REGIONS: CPT

## 2023-06-23 PROCEDURE — 76819 FETAL BIOPHYS PROFIL W/O NST: CPT | Performed by: OBSTETRICS & GYNECOLOGY

## 2023-06-23 NOTE — PROGRESS NOTES
SV IUP-130 BPM  VERTEX  Fundal  Post II Plac  EFW 3884 gr=90%    Movement=10+ 2  Tone-6   2  Breathing=10+ 2  Fluid-LVP=6 2  2-NAVDEEP-23 4    Total BPP  8

## 2023-06-25 ENCOUNTER — NURSE TRIAGE (OUTPATIENT)
Dept: OTHER | Facility: OTHER | Age: 28
End: 2023-06-25

## 2023-06-25 NOTE — TELEPHONE ENCOUNTER
Patient calling in today stating she started with contractions around 1719 this evening  She stated they have been averaging at about 8 minutes apart but she will have some that are closer and some that are farther apart as well  She rated her contraction pain 6/10 and stated with the contraction she feels abdominal tightening, pelvic pressure and cramping in her lower abdomen and back  Patient stated she has been having normal fetal movement and denies any leakage of fluid  She stated she had her cervix checked on Thursday and was 1cm dilated, after the check she did experience some spotting for a couple days  On call provider contacted, recommends patient continue to monitor contractions over the next hour and if they stay regular, become more frequent or increase intensity she should call back  Patient made aware and verbalized understanding

## 2023-06-25 NOTE — TELEPHONE ENCOUNTER
"  Reason for Disposition  • [1] First baby (primipara) AND [2] contractions > 5 minutes apart, or for < 2 hours    Answer Assessment - Initial Assessment Questions  1  ONSET: \"When did the symptoms begin? \"         Started timing them at 1719    2  CONTRACTIONS: \"Describe the contractions that you are having  \" (e g , duration, frequency, regularity, severity)      Averaging about 8 mins apart some closer together and some farther apart      10- feels abdominal tightening and cramping in lower abdomen and back     and pelvic pressure     3  JOSE A: \"What date are you expecting to deliver? \"      23    4  PARITY: \"Have you had a baby before? \" If Yes, ask: \"How long did the labor last?\"          5  FETAL MOVEMENT: \"Has the baby's movement decreased or changed significantly from normal?\"      Good fetal movement     6  OTHER SYMPTOMS: \"Do you have any other symptoms? \" (e g , leaking fluid from vagina, vaginal bleeding, fever, hand/facial swelling)      Denies leakage of fluid, had some spotting for a couple days after cervical check on Thursday, hands and feet are swollen    Protocols used: PREGNANCY - LABOR-ADULT-AH    "

## 2023-06-25 NOTE — TELEPHONE ENCOUNTER
"Regarding: labor sx  ----- Message from Taylor Stone sent at 6/25/2023  7:46 PM EDT -----  Pt called, \" I am still experiencing contractions and the last 2 were 5 mins apart  \"    "

## 2023-06-25 NOTE — TELEPHONE ENCOUNTER
"Regarding: labor sx/ possible darius monteiro  ----- Message from Trudy Aparicio sent at 6/25/2023  6:28 PM EDT -----  Pt called, \" I am 38 wks pregnant and I am not sure if I am having darius monteiro or contractions  I've been timing them and they are approximately 8 mins apart  \"    "

## 2023-06-26 NOTE — TELEPHONE ENCOUNTER
"  Reason for Disposition  • [1] First baby (primipara) AND [2] contractions > 5 minutes apart, or for < 2 hours    Answer Assessment - Initial Assessment Questions  1  ONSET: \"When did the symptoms begin? \"         9198  2  CONTRACTIONS: \"Describe the contractions that you are having  \" (e g , duration, frequency, regularity, severity)      Lower abd gets tight, coming 7-5 minutes apart   3  JOSE A: \"What date are you expecting to deliver? \"      7/6/2023   4  PARITY: \"Have you had a baby before? \" If Yes, ask: \"How long did the labor last?\"      No   5  FETAL MOVEMENT: \"Has the baby's movement decreased or changed significantly from normal?\"      WNL  6  OTHER SYMPTOMS: \"Do you have any other symptoms? \" (e g , leaking fluid from vagina, vaginal bleeding, fever, hand/facial swelling)      no    Protocols used: PREGNANCY - LABOR-ADULT-AH    "

## 2023-06-26 NOTE — TELEPHONE ENCOUNTER
Followed up with patient  Was told to observe contractions by service until they become 5 minutes apart  Patient states contractions have subsided and she is resting  Denies any fluid leaking from the vagina

## 2023-06-27 ENCOUNTER — HOSPITAL ENCOUNTER (INPATIENT)
Facility: HOSPITAL | Age: 28
LOS: 4 days | Discharge: HOME | End: 2023-07-01
Attending: OBSTETRICS & GYNECOLOGY | Admitting: STUDENT IN AN ORGANIZED HEALTH CARE EDUCATION/TRAINING PROGRAM
Payer: COMMERCIAL

## 2023-06-27 ENCOUNTER — TELEPHONE (OUTPATIENT)
Dept: OBGYN CLINIC | Facility: CLINIC | Age: 28
End: 2023-06-27

## 2023-06-27 ENCOUNTER — NURSE TRIAGE (OUTPATIENT)
Dept: OTHER | Facility: OTHER | Age: 28
End: 2023-06-27

## 2023-06-27 ENCOUNTER — HOSPITAL ENCOUNTER (INPATIENT)
Facility: HOSPITAL | Age: 28
LOS: 1 days | Discharge: HOME | End: 2023-06-27
Attending: OBSTETRICS & GYNECOLOGY | Admitting: OBSTETRICS & GYNECOLOGY
Payer: COMMERCIAL

## 2023-06-27 VITALS — SYSTOLIC BLOOD PRESSURE: 108 MMHG | DIASTOLIC BLOOD PRESSURE: 66 MMHG | HEART RATE: 105 BPM

## 2023-06-27 DIAGNOSIS — Z3A.38 38 WEEKS GESTATION OF PREGNANCY: Primary | ICD-10-CM

## 2023-06-27 PROBLEM — O42.90 PROM (PREMATURE RUPTURE OF MEMBRANES): Status: ACTIVE | Noted: 2023-06-27

## 2023-06-27 LAB — A1 MICROGLOB PLACENTAL VAG QL: POSITIVE

## 2023-06-27 PROCEDURE — 99203 OFFICE O/P NEW LOW 30 MIN: CPT

## 2023-06-27 PROCEDURE — NC001 PR NO CHARGE: Performed by: STUDENT IN AN ORGANIZED HEALTH CARE EDUCATION/TRAINING PROGRAM

## 2023-06-27 PROCEDURE — 4A1HXCZ MONITORING OF PRODUCTS OF CONCEPTION, CARDIAC RATE, EXTERNAL APPROACH: ICD-10-PCS | Performed by: OBSTETRICS & GYNECOLOGY

## 2023-06-27 PROCEDURE — 84112 EVAL AMNIOTIC FLUID PROTEIN: CPT

## 2023-06-27 RX ORDER — SODIUM CHLORIDE, SODIUM LACTATE, POTASSIUM CHLORIDE, CALCIUM CHLORIDE 600; 310; 30; 20 MG/100ML; MG/100ML; MG/100ML; MG/100ML
125 INJECTION, SOLUTION INTRAVENOUS CONTINUOUS
Status: DISCONTINUED | OUTPATIENT
Start: 2023-06-27 | End: 2023-06-29

## 2023-06-27 RX ORDER — BUPIVACAINE HYDROCHLORIDE 2.5 MG/ML
30 INJECTION, SOLUTION EPIDURAL; INFILTRATION; INTRACAUDAL ONCE AS NEEDED
Status: DISCONTINUED | OUTPATIENT
Start: 2023-06-27 | End: 2023-06-29

## 2023-06-27 RX ORDER — ONDANSETRON 2 MG/ML
4 INJECTION INTRAMUSCULAR; INTRAVENOUS EVERY 6 HOURS PRN
Status: DISCONTINUED | OUTPATIENT
Start: 2023-06-27 | End: 2023-06-27

## 2023-06-27 NOTE — PROGRESS NOTES
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 at 38w5d presenting for rule out rupture of membranes and rule out labor    PLAN:     1) Rule out PROM:  Speculum: Mucous plug in vaginal vault, scant fluid,no pooling, no bleeding   Wet mt/KOH: negative   Nitrazine: negative   Ferning negative  Amnisure ordered, pending at time of discharge due to Hospital lab not having the reagent necessary to complete test   NAVDEEP: 8 27cm   Pad checks: 1hr post walk-> no fluid noted    2) Discharge home with strict precautions  due to low suspicion for PROM  precautions for early labor signs discussed  Patient expressed desire to go home despite the result being pending      - Case discussed with Dr Nakul Mendoza:    Ioana Gastelum 25 y o  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 at 38w5d with an Estimated Date of Delivery: 7/6/23 presented to triage with a complaint of a gush of fluid and cramping  She states that this afternoon after voiding she noticed a gush of fluid that was different color than her urine  She states that she noticed her mucous plug came out as well  She denies vaginal bleeding  She reports cramping and endorses good fetal movement  She denies recent sexual activity and abdominal trauma  She denies fever and chills       Her current obstetrical history is significant for pregnancy at 44w7d gestational age pregnancy complicated by intractable vomiting and nausea and prolonged QT, obesity, abnormal GTT    Her past obstetrical history none     Contractions: cramping   Leakage of fluid: yes  Vaginal Bleeding: no  Fetal movement: present    OBJECTIVE:    Vitals:    06/27/23 1451   BP: 108/66   Pulse: 105       ROS:  Constitutional: Negative  Respiratory: Negative  Cardiovascular: Negative    Gastrointestinal: Negative    General Physical Exam:  General: in no apparent distress  Cardiovascular: Cor RRR  Lungs: non-labored breathing  Abdomen: abdomen is soft without significant tenderness, masses, organomegaly or guarding  Lower extremeties: nontender    Cervical Exam  Speculum: Cervical os is closed   SVE: 1 / 0% / -3    Fetal monitoring:  FHT:  135 bpm/ Moderate 6 - 25 bpm / 15 x 15 accelerations present, no decelerations  [place]: contractions Q 3-4  KOH/WTMT:     Infection:   - no clue cells    - no hyphae   - n trichomonads present    Membrane status   - no ferning   - negative  nitrazene   - no pooling           Abd  US   NAVDEEP      - Q1 2 84cm     - Q2 2 30cm     - Q3 0 98cm     - Q4 2 15 cm     - Total: 8 27cm   Placenta: anterior          Nazia Dias MD  OBGYN PGY-1  6/27/2023 6:44 PM

## 2023-06-27 NOTE — TELEPHONE ENCOUNTER
"Patient called complaining of \"pain this morning\" and a gush of fluid  Lorene Sorenson informed  Patient to be evaluated at L&D  L&D and on call provider notified     "

## 2023-06-28 ENCOUNTER — ANESTHESIA (INPATIENT)
Dept: ANESTHESIOLOGY | Facility: HOSPITAL | Age: 28
End: 2023-06-28
Payer: COMMERCIAL

## 2023-06-28 ENCOUNTER — ANESTHESIA EVENT (INPATIENT)
Dept: ANESTHESIOLOGY | Facility: HOSPITAL | Age: 28
End: 2023-06-28
Payer: COMMERCIAL

## 2023-06-28 LAB
ABO GROUP BLD: NORMAL
AMPHETAMINES SERPL QL SCN: NEGATIVE
BARBITURATES UR QL: NEGATIVE
BENZODIAZ UR QL: NEGATIVE
BLD GP AB SCN SERPL QL: NEGATIVE
COCAINE UR QL: NEGATIVE
ERYTHROCYTE [DISTWIDTH] IN BLOOD BY AUTOMATED COUNT: 14.2 % (ref 11.6–15.1)
HCT VFR BLD AUTO: 36.7 % (ref 34.8–46.1)
HGB BLD-MCNC: 11.6 G/DL (ref 11.5–15.4)
HOLD SPECIMEN: NORMAL
MCH RBC QN AUTO: 25.7 PG (ref 26.8–34.3)
MCHC RBC AUTO-ENTMCNC: 31.6 G/DL (ref 31.4–37.4)
MCV RBC AUTO: 81 FL (ref 82–98)
METHADONE UR QL: NEGATIVE
OPIATES UR QL SCN: NEGATIVE
OXYCODONE+OXYMORPHONE UR QL SCN: NEGATIVE
PCP UR QL: NEGATIVE
PLATELET # BLD AUTO: 296 THOUSANDS/UL (ref 149–390)
PMV BLD AUTO: 10.4 FL (ref 8.9–12.7)
RBC # BLD AUTO: 4.52 MILLION/UL (ref 3.81–5.12)
RH BLD: POSITIVE
SPECIMEN EXPIRATION DATE: NORMAL
THC UR QL: NEGATIVE
TREPONEMA PALLIDUM IGG+IGM AB [PRESENCE] IN SERUM OR PLASMA BY IMMUNOASSAY: NORMAL
WBC # BLD AUTO: 10.87 THOUSAND/UL (ref 4.31–10.16)

## 2023-06-28 PROCEDURE — 80307 DRUG TEST PRSMV CHEM ANLYZR: CPT

## 2023-06-28 PROCEDURE — 85027 COMPLETE CBC AUTOMATED: CPT

## 2023-06-28 PROCEDURE — 86900 BLOOD TYPING SEROLOGIC ABO: CPT

## 2023-06-28 PROCEDURE — 86780 TREPONEMA PALLIDUM: CPT

## 2023-06-28 PROCEDURE — 86901 BLOOD TYPING SEROLOGIC RH(D): CPT

## 2023-06-28 PROCEDURE — 86850 RBC ANTIBODY SCREEN: CPT

## 2023-06-28 RX ORDER — FENTANYL CITRATE 50 UG/ML
INJECTION, SOLUTION INTRAMUSCULAR; INTRAVENOUS AS NEEDED
Status: DISCONTINUED | OUTPATIENT
Start: 2023-06-28 | End: 2023-06-29 | Stop reason: HOSPADM

## 2023-06-28 RX ORDER — ROPIVACAINE HYDROCHLORIDE 5 MG/ML
INJECTION, SOLUTION EPIDURAL; INFILTRATION; PERINEURAL AS NEEDED
Status: DISCONTINUED | OUTPATIENT
Start: 2023-06-28 | End: 2023-06-29 | Stop reason: HOSPADM

## 2023-06-28 RX ORDER — LIDOCAINE HYDROCHLORIDE AND EPINEPHRINE 15; 5 MG/ML; UG/ML
INJECTION, SOLUTION EPIDURAL AS NEEDED
Status: DISCONTINUED | OUTPATIENT
Start: 2023-06-28 | End: 2023-06-29 | Stop reason: HOSPADM

## 2023-06-28 RX ORDER — SODIUM CHLORIDE, SODIUM LACTATE, POTASSIUM CHLORIDE, CALCIUM CHLORIDE 600; 310; 30; 20 MG/100ML; MG/100ML; MG/100ML; MG/100ML
125 INJECTION, SOLUTION INTRAVENOUS CONTINUOUS
Status: DISCONTINUED | OUTPATIENT
Start: 2023-06-28 | End: 2023-06-29

## 2023-06-28 RX ORDER — MIRTAZAPINE 30 MG/1
30 TABLET, FILM COATED ORAL
Status: DISCONTINUED | OUTPATIENT
Start: 2023-06-28 | End: 2023-07-01 | Stop reason: HOSPADM

## 2023-06-28 RX ORDER — ROPIVACAINE HYDROCHLORIDE 2 MG/ML
INJECTION, SOLUTION EPIDURAL; INFILTRATION; PERINEURAL CONTINUOUS PRN
Status: DISCONTINUED | OUTPATIENT
Start: 2023-06-28 | End: 2023-06-29 | Stop reason: HOSPADM

## 2023-06-28 RX ORDER — LIDOCAINE HYDROCHLORIDE AND EPINEPHRINE 20; 5 MG/ML; UG/ML
INJECTION, SOLUTION EPIDURAL; INFILTRATION; INTRACAUDAL; PERINEURAL AS NEEDED
Status: DISCONTINUED | OUTPATIENT
Start: 2023-06-28 | End: 2023-06-29 | Stop reason: HOSPADM

## 2023-06-28 RX ORDER — ROPIVACAINE HYDROCHLORIDE 2 MG/ML
INJECTION, SOLUTION EPIDURAL; INFILTRATION; PERINEURAL AS NEEDED
Status: DISCONTINUED | OUTPATIENT
Start: 2023-06-28 | End: 2023-06-29 | Stop reason: HOSPADM

## 2023-06-28 RX ORDER — FENTANYL CITRATE 50 UG/ML
INJECTION, SOLUTION INTRAMUSCULAR; INTRAVENOUS
Status: COMPLETED
Start: 2023-06-28 | End: 2023-06-28

## 2023-06-28 RX ORDER — BUPIVACAINE HYDROCHLORIDE 5 MG/ML
INJECTION, SOLUTION EPIDURAL; INTRACAUDAL AS NEEDED
Status: DISCONTINUED | OUTPATIENT
Start: 2023-06-28 | End: 2023-06-29 | Stop reason: HOSPADM

## 2023-06-28 RX ORDER — OXYTOCIN/RINGER'S LACTATE 30/500 ML
1-30 PLASTIC BAG, INJECTION (ML) INTRAVENOUS
Status: DISCONTINUED | OUTPATIENT
Start: 2023-06-28 | End: 2023-06-29

## 2023-06-28 RX ORDER — CHLOROPROCAINE HYDROCHLORIDE 30 MG/ML
INJECTION, SOLUTION EPIDURAL; INFILTRATION; INTRACAUDAL; PERINEURAL AS NEEDED
Status: DISCONTINUED | OUTPATIENT
Start: 2023-06-28 | End: 2023-06-29 | Stop reason: HOSPADM

## 2023-06-28 RX ADMIN — ROPIVACAINE HYDROCHLORIDE 4 ML: 2 INJECTION, SOLUTION EPIDURAL; INFILTRATION; PERINEURAL at 05:38

## 2023-06-28 RX ADMIN — SODIUM CHLORIDE, SODIUM LACTATE, POTASSIUM CHLORIDE, AND CALCIUM CHLORIDE 999 ML/HR: .6; .31; .03; .02 INJECTION, SOLUTION INTRAVENOUS at 03:46

## 2023-06-28 RX ADMIN — SODIUM CHLORIDE, SODIUM LACTATE, POTASSIUM CHLORIDE, AND CALCIUM CHLORIDE 125 ML/HR: .6; .31; .03; .02 INJECTION, SOLUTION INTRAVENOUS at 16:00

## 2023-06-28 RX ADMIN — ROPIVACAINE HYDROCHLORIDE: 2 INJECTION, SOLUTION EPIDURAL; INFILTRATION at 16:22

## 2023-06-28 RX ADMIN — Medication 2 MILLI-UNITS/MIN: at 05:21

## 2023-06-28 RX ADMIN — SODIUM CHLORIDE, SODIUM LACTATE, POTASSIUM CHLORIDE, AND CALCIUM CHLORIDE 125 ML/HR: .6; .31; .03; .02 INJECTION, SOLUTION INTRAVENOUS at 05:21

## 2023-06-28 RX ADMIN — ROPIVACAINE HYDROCHLORIDE 8 ML/HR: 2 INJECTION, SOLUTION EPIDURAL; INFILTRATION at 05:41

## 2023-06-28 RX ADMIN — FENTANYL CITRATE 100 MCG: 50 INJECTION INTRAMUSCULAR; INTRAVENOUS at 17:52

## 2023-06-28 RX ADMIN — ROPIVACAINE HYDROCHLORIDE 8 ML: 5 INJECTION EPIDURAL; INFILTRATION; PERINEURAL at 17:52

## 2023-06-28 RX ADMIN — Medication 50 MCG: at 00:48

## 2023-06-28 RX ADMIN — LIDOCAINE HYDROCHLORIDE AND EPINEPHRINE 2 ML: 15; 5 INJECTION, SOLUTION EPIDURAL at 05:36

## 2023-06-28 RX ADMIN — LIDOCAINE HYDROCHLORIDE,EPINEPHRINE BITARTRATE 4 ML: 20; .005 INJECTION, SOLUTION EPIDURAL; INFILTRATION; INTRACAUDAL; PERINEURAL at 16:30

## 2023-06-28 RX ADMIN — SODIUM CHLORIDE, SODIUM LACTATE, POTASSIUM CHLORIDE, AND CALCIUM CHLORIDE 125 ML/HR: .6; .31; .03; .02 INJECTION, SOLUTION INTRAVENOUS at 17:19

## 2023-06-28 RX ADMIN — ROPIVACAINE HYDROCHLORIDE 4 ML: 2 INJECTION, SOLUTION EPIDURAL; INFILTRATION; PERINEURAL at 05:35

## 2023-06-28 RX ADMIN — SODIUM CHLORIDE, SODIUM LACTATE, POTASSIUM CHLORIDE, AND CALCIUM CHLORIDE 125 ML/HR: .6; .31; .03; .02 INJECTION, SOLUTION INTRAVENOUS at 23:36

## 2023-06-28 RX ADMIN — ROPIVACAINE HYDROCHLORIDE 10 ML: 5 INJECTION EPIDURAL; INFILTRATION; PERINEURAL at 12:06

## 2023-06-28 RX ADMIN — CHLOROPROCAINE HYDROCHLORIDE 4 ML: 30 INJECTION, SOLUTION EPIDURAL; INFILTRATION; INTRACAUDAL; PERINEURAL at 16:30

## 2023-06-28 RX ADMIN — ROPIVACAINE HYDROCHLORIDE: 2 INJECTION, SOLUTION EPIDURAL; INFILTRATION at 05:46

## 2023-06-28 RX ADMIN — LIDOCAINE HYDROCHLORIDE AND EPINEPHRINE 3 ML: 15; 5 INJECTION, SOLUTION EPIDURAL at 05:34

## 2023-06-28 RX ADMIN — ROPIVACAINE HYDROCHLORIDE: 2 INJECTION, SOLUTION EPIDURAL; INFILTRATION at 23:34

## 2023-06-28 RX ADMIN — SODIUM CHLORIDE, SODIUM LACTATE, POTASSIUM CHLORIDE, AND CALCIUM CHLORIDE 250 ML/HR: .6; .31; .03; .02 INJECTION, SOLUTION INTRAVENOUS at 13:07

## 2023-06-28 RX ADMIN — BUPIVACAINE HYDROCHLORIDE 8 ML: 5 INJECTION, SOLUTION EPIDURAL; INTRACAUDAL; PERINEURAL at 23:58

## 2023-06-28 RX ADMIN — MIRTAZAPINE 30 MG: 30 TABLET, FILM COATED ORAL at 02:57

## 2023-06-28 RX ADMIN — SODIUM CHLORIDE, SODIUM LACTATE, POTASSIUM CHLORIDE, AND CALCIUM CHLORIDE 125 ML/HR: .6; .31; .03; .02 INJECTION, SOLUTION INTRAVENOUS at 08:28

## 2023-06-28 NOTE — H&P
H&P Exam - Obstetrics   Adrienne Israel 25 y.o. female MRN: [id number]  Unit/Bed#: L&D 322-01 Encounter: [id number]      ASSESSMENT:  Patient is 26 yo  at 38w5d weeks gestation who is being admitted for IOL after PROM.   EFW: 1977 grams - 4 lbs 6 oz (54%) as of    VTX by transabdominal ultrasound     PLAN:    * 38 weeks gestation of pregnancy  Assessment & Plan  Pregnancy at 38w5d  Admit  Follow up CBC, RPR, Blood Type  Start with Cytotec, FB, Pitocin as needed  Method of contraception: oral contraceptive pills  GBS negative status  Analgesia and/or epidural at patient request  Anticipate     PROM (premature rupture of membranes)  Assessment & Plan  Nitrazine and Ferning negative  Amnisure (Malachi-1 Microglobulin) - positive    Depression affecting pregnancy in third trimester, antepartum  Assessment & Plan  On Remeron 30mg daily    Marijuana use  Assessment & Plan  Urine drug screen     Anxiety  Assessment & Plan  On Remeron 30 mg HS    Prolonged QT interval syndrome  Assessment & Plan  Avoid Zofran, Reglan and other QT prolonging medications. Intractable cyclical vomiting with nausea  Assessment & Plan  No active symptoms  As needed antiemetic    Discussed with Dr. Mark Michel    This patient will be an INPATIENT  and I certify the anticipated length of stay is >2 Midnights    History of Present Illness     Chief Complaint: PROM    HPI:  Adrienne Israel is a 25 y.o.  female with an JOSE A of 2023, by Last Menstrual Period at 38w5d weeks gestation who is being admitted for PROM. Patient felt gush of fluid this afternoon. Evaluated [facility] at [facility] earlier today for r/o PROM. Initial Nitrazine and Ferning negative, NAVDEEP wnl, and low clinical suspicion for rupture. Discharged home with return precautions. Amnisure was pending at the time of discharge. Amnisure report came back positive.  Discussed with patient the high false positive rate and discussed with her the option of induction at this time or close follow up outpatient. Patient opted for admission with IOL. Denies nausea, vomiting, vaginal bleeding, vaginal discharge. Contractions: Present  Loss of fluid: Yes (PROM)  Vaginal bleeding: No  Fetal movement: Present    She is Dr. Radu Clemente MD patient. PREGNANCY COMPLICATIONS:   As above    OB History    Para Term  AB Living   1 0 0 0 0 0   SAB IAB Ectopic Multiple Live Births   0 0 0 0 0      # Outcome Date GA Lbr Duglas/2nd Weight Sex Delivery Anes PTL Lv   1 Current               Obstetric Comments   Menarche 12   Irregular, on OCP       Baby complications/comments: None    Review of Systems   Constitutional: Negative for activity change, chills and fever. HENT: Negative. Respiratory: Negative for chest tightness and shortness of breath. Cardiovascular: Negative for chest pain and palpitations. Gastrointestinal: Negative for abdominal pain, nausea and vomiting. Genitourinary: Positive for vaginal discharge. Negative for vaginal bleeding. Musculoskeletal: Negative for gait problem. Skin: Negative for rash. Neurological: Negative. All other systems reviewed and are negative.         Historical Information   Past Medical History:   Diagnosis Date   • Cyclical vomiting syndrome    • Depression    • Functional dyspnea    • Migraine    • Varicella     had chicken pox as a child     Past Surgical History:   Procedure Laterality Date   • CHOLECYSTECTOMY  2018   • TONSILLECTOMY       Social History   Social History     Substance and Sexual Activity   Alcohol Use Not Currently    Comment: rarely     Social History     Substance and Sexual Activity   Drug Use Not Currently   • Types: Marijuana    Comment: last used 2 weeks before finding out patient was pregnant (used within last two years)     Social History     Tobacco Use   Smoking Status Former   • Types: Cigarettes   Smokeless Tobacco Never     Family History:   Family History Problem Relation Age of Onset   • Drug abuse Father    • No Known Problems Brother    • Cancer Maternal Grandmother         lung   • Breast cancer Neg Hx    • Colon cancer Neg Hx    • Ovarian cancer Neg Hx    • Uterine cancer Neg Hx        Meds/Allergies      Medications Prior to Admission   Medication   • mirtazapine (REMERON) 30 mg tablet   • Prenatal Vit-Iron Carbonyl-FA (prenatal multivitamin) TABS        Allergies   Allergen Reactions   • Metoclopramide Other (See Comments)     racing heart, anxious, agitated       OBJECTIVE:    Vitals: Blood pressure 129/86, pulse (!) 116, temperature 99 °F (37.2 °C), temperature source Oral, resp. rate 18, height 5' 5" (1.651 m), weight 111 kg (244 lb), last menstrual period 09/29/2022. Body mass index is 40.6 kg/m². Physical Exam  Vitals and nursing note reviewed. Exam conducted with a chaperone present. Constitutional:       General: She is not in acute distress. HENT:      Head: Normocephalic and atraumatic. Right Ear: External ear normal.      Left Ear: External ear normal.      Nose: Nose normal.      Mouth/Throat:      Mouth: Mucous membranes are moist.      Pharynx: Oropharynx is clear. Eyes:      General: No scleral icterus. Extraocular Movements: Extraocular movements intact. Conjunctiva/sclera: Conjunctivae normal.   Cardiovascular:      Pulses: Normal pulses. Pulmonary:      Effort: Pulmonary effort is normal.   Abdominal:      Tenderness: There is no abdominal tenderness. There is no guarding. Comments: gravid   Musculoskeletal:         General: Normal range of motion. Skin:     General: Skin is warm. Capillary Refill: Capillary refill takes less than 2 seconds. Neurological:      General: No focal deficit present. Mental Status: She is alert and oriented to person, place, and time.          Exam done during previous presentation:  Ferning: Negative  Nitrazine: Negative  Amnisure: Positive    Cervix:   SVE: 1/0/-3 Fetal heart rate: Baseline Rate: 140 bpm  Variability: Moderate 6-25 bpm  Accelerations: 15 x 15 or greater    [place]:   Contraction Frequency (minutes): 3  Contraction Duration (seconds): 60-70  Contraction Quality: Mild    GBS: Negative    Prenatal Labs: I have personally reviewed pertinent reports.   , Blood Type:   Lab Results   Component Value Date/Time    ABO Grouping A 12/07/2022 02:27 PM      Lab Results   Component Value Date/Time    Rh Factor Positive 12/07/2022 02:27 PM      Lab Results   Component Value Date/Time    Hematocrit 36.7 06/28/2023 03:18 AM    Hematocrit 40.2 06/26/2015 04:56 PM    Hemoglobin 11.6 06/28/2023 03:18 AM    Hemoglobin 13.8 06/26/2015 04:56 PM     Lab Results   Component Value Date/Time    MCV 81 (L) 06/28/2023 03:18 AM    MCV 86 06/26/2015 04:56 PM      Lab Results   Component Value Date/Time    Platelets 513 02/21/9513 03:18 AM    Platelets 227 19/07/8248 04:56 PM     Lab Results   Component Value Date/Time    Glucose 140 (H) 04/07/2023 10:21 AM     Lab Results   Component Value Date/Time    Glucose, GTT - 3 Hour 101 04/15/2023 11:12 AM     Lab Results   Component Value Date/Time    Rubella IgG Quant >175.0 12/07/2022 02:27 PM     Lab Results   Component Value Date/Time    RPR Non-Reactive 12/07/2022 02:27 PM      Lab Results   Component Value Date/Time    Urine Culture No Growth <1000 cfu/mL 12/09/2022 02:17 PM        Lab Results   Component Value Date/Time    Hepatitis B Surface Ag Non-reactive 12/07/2022 02:27 PM       Lab Results   Component Value Date/Time    HIV-1/HIV-2 Ab Non-Reactive 12/07/2022 02:27 PM     Lab Results   Component Value Date/Time    External Chlamydia Screen negative 12/22/2022 12:00 AM      Lab Results   Component Value Date/Time    N gonorrhoeae, DNA Probe Negative 12/22/2022 11:34 AM     Lab Results   Component Value Date/Time    Strep Grp B PCR Negative 06/09/2023 11:38 AM          Invasive Devices     Peripheral Intravenous Line  Duration Peripheral IV 06/28/23 Dorsal (posterior); Right Hand <1 day                Yeyo Avina  PGGY-2  06/27/2023  4:27 AM      Morena iVncent MD  , PGY-2  6/28/2023  4:27 AM

## 2023-06-28 NOTE — OB LABOR/OXYTOCIN SAFETY PROGRESS
Labor Progress Note - Michele Jensen 25 y.o. female MRN: [id number]    Unit/Bed#: L&D 322-01 Encounter: [id number]    Dose (bel-units/min) Oxytocin: 8 bel-units/min  Contraction Frequency (minutes):  Indeterminate  Contraction Quality: Moderate  Tachysystole: No   Cervical Dilation: 3        Cervical Effacement: 90  Fetal Station: -2  Baseline Rate: 125 bpm  Fetal Heart Rate: 135 BPM  FHR Category: Category I               Vital Signs:   Vitals:    06/28/23 0955   BP: 102/50   Pulse: 102   Resp:    Temp:        Notes/comments:   AROM forebag for clear, bloody fluid  Reposition  Continue to titrate pitocin  All questions answered    Minor Hyman MD 6/28/2023 10:54 AM

## 2023-06-28 NOTE — OB LABOR/OXYTOCIN SAFETY PROGRESS
Oxytocin Safety Progress Check Note - Eduardo Gant 25 y.o. female MRN: [id number]    Unit/Bed#: L&D 322-01 Encounter: [id number]    Dose (bel-units/min) Oxytocin: 12 bel-units/min  Contraction Frequency (minutes): 2-3  Contraction Quality: Mild  Tachysystole: No   Cervical Dilation: 3        Cervical Effacement: 90  Fetal Station: -2  Baseline Rate: 130 bpm  Fetal Heart Rate: 135 BPM  FHR Category: Category II               Vital Signs:  Vitals:    06/28/23 1154   BP: 118/79   Pulse: 91   Resp:    Temp:        Notes/comments:   SAFETY HUDDLE:  TRIGGER: Category 2 FHR tracing    PARTICIPANTS: Marlon    REVIEW OF CURRENT PLAN OF CARE AND MANAGEMENT: Currently, moderate variability is/are present. The tracing has been category II for 30 minutes due to variable decelerations. At this time, resuscitative measures are being employed, including amnioinfusion, discontinuing oxytocin. I recommend continued resuscitation and observation because the decelerations have been occurring for less than one hour.      TIMELINE FOR NEXT ASSESSMENT: 30 mins    ALL PARTICIPANTS IN AGREEMENT WITH PLAN OF CARE: Yes    IS PERRT REQUIRED: No       IUPC inserted due to recurrent variable decelerations  Cervix is unchanged  Amnioinfusion started  Oxytocin held - will re-start once category 1 tracing    Beverley Rush MD 6/28/2023 1:02 PM

## 2023-06-28 NOTE — ANESTHESIA PROCEDURE NOTES
Epidural Block    Patient location during procedure: OB  Start time: 6/28/2023 5:33 AM  Reason for block: at surgeon's request and primary anesthetic  Staffing  Performed by: Mary Quintanilla DO  Authorized by: Mary Quintanilla DO    Preanesthetic Checklist  Completed: patient identified, IV checked, site marked, risks and benefits discussed, surgical consent, monitors and equipment checked, pre-op evaluation and timeout performed  Epidural  Patient position: sitting  Prep: Betadine  Patient monitoring: frequent blood pressure checks  Approach: midline  Location: lumbar  Injection technique: SHAUNA saline  Needle  Needle type: Tuohy   Needle gauge: 18 G  Catheter type: side hole  Catheter size: 20 G  Catheter at skin depth: 10 cm  Catheter securement method: clear occlusive dressing  Test dose: negative  Assessment  Number of attempts: 1negative aspiration for CSF, negative aspiration for heme and no paresthesia on injection  patient tolerated the procedure well with no immediate complications

## 2023-06-28 NOTE — OB LABOR/OXYTOCIN SAFETY PROGRESS
Oxytocin Safety Progress Check Note - Alondra Wong 25 y.o. female MRN: [id number]    Unit/Bed#: L&D 322-01 Encounter: [id number]    Dose (bel-units/min) Oxytocin: 6 bel-units/min  Contraction Frequency (minutes): 3  Contraction Quality: Moderate  Tachysystole: No   Cervical Dilation: 4        Cervical Effacement: 90  Fetal Station: -1  Baseline Rate: 130 bpm  Fetal Heart Rate: 135 BPM  FHR Category: Category I  Oxytocin Safety Progress Check: Safety check completed            Vital Signs:   Vitals:    06/28/23 1600   BP: 131/82   Pulse:    Resp:    Temp:        Notes/comments:   FHR cat 1  IUPC was displaced into vagina. Replaced.    Urinary siddiqui was low: moved to position, off tension  Cervix progressed slightly in dilation and station  Continue oxytocin  Reassurance provided  If amnioinfusion does not leak out and variables are not present, ok to DC infusion and keep IUPC for monitoring only        Radha Bueno MD 6/28/2023 4:23 PM

## 2023-06-28 NOTE — OB LABOR/OXYTOCIN SAFETY PROGRESS
Labor Progress Note - Marty Summers 25 y.o. female MRN: [id number]    Unit/Bed#: L&D 322-01 Encounter: [id number]       Contraction Frequency (minutes): 3  Contraction Quality: Mild  Tachysystole: No   Cervical Dilation: 2-3        Cervical Effacement: 20  Fetal Station: -3  Baseline Rate: 140 bpm  Fetal Heart Rate: 130 BPM              Vital Signs:   Vitals:    06/28/23 0008   BP: 129/86   Pulse: (!) 116   Resp: 18   Temp: 99 °F (37.2 °C)       Notes/comments:   Pt is feeling increasing pain with contractions. SVE as above.  bpm with moderate variability, accelerations, no decelerations. She is requesting an epidural.  We will start Pitocin after epidural placement.     Dr. Beverly Castillo aware    Kaela Day MD 6/28/2023 4:58 AM

## 2023-06-28 NOTE — OB LABOR/OXYTOCIN SAFETY PROGRESS
Oxytocin Safety Progress Check Note - Mario Alberto Speak 25 y.o. female MRN: [id number]    Unit/Bed#: L&D 322-01 Encounter: [id number]    Dose (bel-units/min) Oxytocin: 2 bel-units/min  Contraction Frequency (minutes): 2-4  Contraction Quality: Mild  Tachysystole: No   Cervical Dilation: 2-3        Cervical Effacement: 20  Fetal Station: -3  Baseline Rate: 130 bpm  Fetal Heart Rate: 135 BPM  FHR Category: Category I               Vital Signs:   Vitals:    06/28/23 0641   BP: 115/66   Pulse: (!) 115   Resp:    Temp:        Notes/comments:   Patient comfortable with epidural.  Cervical exam deferred. Continue oxytocin.         Naveen Ladd MD 6/28/2023 7:55 AM

## 2023-06-28 NOTE — PLAN OF CARE
Problem: ANTEPARTUM  Goal: Maintain pregnancy as long as maternal and/or fetal condition is stable  Description: INTERVENTIONS:  - Maternal surveillance  - Fetal surveillance  - Monitor uterine activity  - Medications as ordered  - Bedrest  Outcome: Progressing     Problem: BIRTH - VAGINAL/ SECTION  Goal: Fetal and maternal status remain reassuring during the birth process  Description: INTERVENTIONS:  - Monitor vital signs  - Monitor fetal heart rate  - Monitor uterine activity  - Monitor labor progression (vaginal delivery)  - DVT prophylaxis  - Antibiotic prophylaxis  Outcome: Progressing  Goal: Emotionally satisfying birthing experience for mother/fetus  Description: Interventions:  - Assess, plan, implement and evaluate the nursing care given to the patient in labor  - Advocate the philosophy that each childbirth experience is a unique experience and support the family's chosen level of involvement and control during the labor process   - Actively participate in both the patient's and family's teaching of the birth process  - Consider cultural, [employer] and age-specific factors and plan care for the patient in labor  Outcome: Progressing

## 2023-06-28 NOTE — TELEPHONE ENCOUNTER
"  Reason for Disposition  • [1] Leakage of fluid from vagina AND [2] leakage started < 4 hours ago    Answer Assessment - Initial Assessment Questions  1  ONSET: \"When did the symptoms begin? \"         This afternoon   2  CONTRACTIONS: \"Describe the contractions that you are having  \" (e g , duration, frequency, regularity, severity)      Yes infrequent  3  JOSE A: \"What date are you expecting to deliver? \"      2 weeks   4  PARITY: \"Have you had a baby before? \" If Yes, ask: \"How long did the labor last?\"      1st  5  FETAL MOVEMENT: \"Has the baby's movement decreased or changed significantly from normal?\"      Baby is moving normally  6  OTHER SYMPTOMS: \"Do you have any other symptoms? \" (e g , leaking fluid from vagina, vaginal bleeding, fever, hand/facial swelling)      No    Protocols used: PREGNANCY - LABOR-ADULT-AH    "
Regardin Weeks & 5 days pregnant looking for medical advice regarding positive test results  ----- Message from Natasha Glover sent at 2023  8:55 PM EDT -----  '' I'm 38 weeks and 5 days pregnant  I went to the hospital because I thought my water had broke I was told that my water did not broke, I had a test done while I was at the hospital and was told that I would've gotten a call from the doctor my with the results  I just got back my results and it's saying positive looking for medical advice  ''
none

## 2023-06-28 NOTE — ASSESSMENT & PLAN NOTE
Pregnancy at 38w5d  Admit  Follow up CBC, RPR, Blood Type  Start with Cytotec, Pitocin as needed  Method of contraception: oral contraceptive pills  GBS negative status  Analgesia and/or epidural at patient request  Anticipate

## 2023-06-28 NOTE — ANESTHESIA PREPROCEDURE EVALUATION
Procedure:  LABOR ANALGESIA    Relevant Problems   CARDIO   (+) Prolonged QT interval syndrome      GYN   (+) 38 weeks gestation of pregnancy   (+) Encounter for supervision of normal first pregnancy in second trimester      NEURO/PSYCH   (+) Anxiety   (+) Depression affecting pregnancy in third trimester, antepartum      Other   (+) Maternal obesity, antepartum        Physical Exam    Airway    Mallampati score: III  TM Distance: >3 FB  Neck ROM: full     Dental   No notable dental hx     Cardiovascular  Cardiovascular exam normal    Pulmonary  Pulmonary exam normal     Other Findings        Anesthesia Plan  ASA Score- 3     Anesthesia Type- epidural with ASA Monitors  Additional Monitors:   Airway Plan:           Plan Factors-    Chart reviewed  Existing labs reviewed  Patient summary reviewed  Induction-     Postoperative Plan-     Informed Consent- Anesthetic plan and risks discussed with patient

## 2023-06-29 LAB
BASE EXCESS BLDCOA CALC-SCNC: -5.8 MMOL/L (ref 3–11)
BASE EXCESS BLDCOV CALC-SCNC: -5.8 MMOL/L (ref 1–9)
HCO3 BLDCOA-SCNC: 20.2 MMOL/L (ref 17.3–27.3)
HCO3 BLDCOV-SCNC: 19.2 MMOL/L (ref 12.2–28.6)
O2 CT VFR BLDCOA CALC: 8.1 ML/DL
OXYHGB MFR BLDCOA: 34.9 %
OXYHGB MFR BLDCOV: 47.5 %
PCO2 BLDCOA: 41.7 MM[HG] (ref 30–60)
PCO2 BLDCOV: 36.7 MM HG (ref 27–43)
PH BLDCOA: 7.3 [PH] (ref 7.23–7.43)
PH BLDCOV: 7.34 [PH] (ref 7.19–7.49)
PO2 BLDCOA: 18.3 MM HG (ref 5–25)
PO2 BLDCOV: 21.5 MM HG (ref 15–45)
SAO2 % BLDCOV: 11.2 ML/DL

## 2023-06-29 PROCEDURE — 88307 TISSUE EXAM BY PATHOLOGIST: CPT | Performed by: STUDENT IN AN ORGANIZED HEALTH CARE EDUCATION/TRAINING PROGRAM

## 2023-06-29 PROCEDURE — 59400 OBSTETRICAL CARE: CPT | Performed by: OBSTETRICS & GYNECOLOGY

## 2023-06-29 PROCEDURE — 0KQM0ZZ REPAIR PERINEUM MUSCLE, OPEN APPROACH: ICD-10-PCS | Performed by: OBSTETRICS & GYNECOLOGY

## 2023-06-29 PROCEDURE — 82805 BLOOD GASES W/O2 SATURATION: CPT | Performed by: OBSTETRICS & GYNECOLOGY

## 2023-06-29 RX ORDER — IBUPROFEN 600 MG/1
600 TABLET ORAL EVERY 6 HOURS
Status: DISCONTINUED | OUTPATIENT
Start: 2023-06-29 | End: 2023-06-29

## 2023-06-29 RX ORDER — DIAPER,BRIEF,INFANT-TODD,DISP
1 EACH MISCELLANEOUS DAILY PRN
Status: DISCONTINUED | OUTPATIENT
Start: 2023-06-29 | End: 2023-07-01 | Stop reason: HOSPADM

## 2023-06-29 RX ORDER — SIMETHICONE 80 MG
80 TABLET,CHEWABLE ORAL 4 TIMES DAILY PRN
Status: DISCONTINUED | OUTPATIENT
Start: 2023-06-29 | End: 2023-07-01 | Stop reason: HOSPADM

## 2023-06-29 RX ORDER — OXYCODONE HYDROCHLORIDE 5 MG/1
5 TABLET ORAL ONCE
Status: COMPLETED | OUTPATIENT
Start: 2023-06-29 | End: 2023-06-29

## 2023-06-29 RX ORDER — ONDANSETRON 2 MG/ML
4 INJECTION INTRAMUSCULAR; INTRAVENOUS EVERY 8 HOURS PRN
Status: DISCONTINUED | OUTPATIENT
Start: 2023-06-29 | End: 2023-07-01 | Stop reason: HOSPADM

## 2023-06-29 RX ORDER — ACETAMINOPHEN 325 MG/1
650 TABLET ORAL EVERY 4 HOURS PRN
Status: DISCONTINUED | OUTPATIENT
Start: 2023-06-29 | End: 2023-07-01 | Stop reason: HOSPADM

## 2023-06-29 RX ORDER — ACETAMINOPHEN 325 MG/1
975 TABLET ORAL ONCE
Status: COMPLETED | OUTPATIENT
Start: 2023-06-29 | End: 2023-06-29

## 2023-06-29 RX ORDER — DOCUSATE SODIUM 100 MG/1
100 CAPSULE, LIQUID FILLED ORAL 2 TIMES DAILY
Status: DISCONTINUED | OUTPATIENT
Start: 2023-06-29 | End: 2023-07-01 | Stop reason: HOSPADM

## 2023-06-29 RX ORDER — DIPHENHYDRAMINE HCL 25 MG
25 TABLET ORAL EVERY 6 HOURS PRN
Status: DISCONTINUED | OUTPATIENT
Start: 2023-06-29 | End: 2023-07-01 | Stop reason: HOSPADM

## 2023-06-29 RX ORDER — CALCIUM CARBONATE 500 MG/1
1000 TABLET, CHEWABLE ORAL DAILY PRN
Status: DISCONTINUED | OUTPATIENT
Start: 2023-06-29 | End: 2023-07-01 | Stop reason: HOSPADM

## 2023-06-29 RX ORDER — IBUPROFEN 600 MG/1
600 TABLET ORAL EVERY 6 HOURS
Status: DISCONTINUED | OUTPATIENT
Start: 2023-06-29 | End: 2023-06-30

## 2023-06-29 RX ORDER — KETOROLAC TROMETHAMINE 30 MG/ML
30 INJECTION, SOLUTION INTRAMUSCULAR; INTRAVENOUS ONCE
Status: COMPLETED | OUTPATIENT
Start: 2023-06-29 | End: 2023-06-29

## 2023-06-29 RX ORDER — OXYTOCIN/RINGER'S LACTATE 30/500 ML
250 PLASTIC BAG, INJECTION (ML) INTRAVENOUS ONCE
Status: COMPLETED | OUTPATIENT
Start: 2023-06-29 | End: 2023-06-29

## 2023-06-29 RX ADMIN — Medication 250 MILLI-UNITS/MIN: at 07:16

## 2023-06-29 RX ADMIN — TRIMETHOBENZAMIDE HYDROCHLORIDE 200 MG: 100 INJECTION INTRAMUSCULAR at 01:25

## 2023-06-29 RX ADMIN — ACETAMINOPHEN 325MG 650 MG: 325 TABLET ORAL at 22:36

## 2023-06-29 RX ADMIN — PRENATAL VIT W/ FE FUMARATE-FA TAB 27-0.8 MG 1 TABLET: 27-0.8 TAB at 08:08

## 2023-06-29 RX ADMIN — MIRTAZAPINE 30 MG: 30 TABLET, FILM COATED ORAL at 22:36

## 2023-06-29 RX ADMIN — SODIUM CHLORIDE 3 G: 9 INJECTION, SOLUTION INTRAVENOUS at 08:09

## 2023-06-29 RX ADMIN — DOCUSATE SODIUM 100 MG: 100 CAPSULE, LIQUID FILLED ORAL at 17:18

## 2023-06-29 RX ADMIN — ACETAMINOPHEN 325MG 650 MG: 325 TABLET ORAL at 17:18

## 2023-06-29 RX ADMIN — IBUPROFEN 600 MG: 600 TABLET, FILM COATED ORAL at 08:08

## 2023-06-29 RX ADMIN — ROPIVACAINE HYDROCHLORIDE: 2 INJECTION, SOLUTION EPIDURAL; INFILTRATION at 04:30

## 2023-06-29 RX ADMIN — BENZOCAINE AND LEVOMENTHOL 1 APPLICATION: 200; 5 SPRAY TOPICAL at 09:36

## 2023-06-29 RX ADMIN — KETOROLAC TROMETHAMINE 30 MG: 30 INJECTION, SOLUTION INTRAMUSCULAR; INTRAVENOUS at 13:00

## 2023-06-29 RX ADMIN — DOCUSATE SODIUM 100 MG: 100 CAPSULE, LIQUID FILLED ORAL at 08:08

## 2023-06-29 RX ADMIN — MIRTAZAPINE 30 MG: 30 TABLET, FILM COATED ORAL at 00:07

## 2023-06-29 RX ADMIN — ACETAMINOPHEN 325MG 975 MG: 325 TABLET ORAL at 07:02

## 2023-06-29 RX ADMIN — IBUPROFEN 600 MG: 600 TABLET, FILM COATED ORAL at 19:59

## 2023-06-29 RX ADMIN — OXYCODONE HYDROCHLORIDE 5 MG: 5 TABLET ORAL at 19:59

## 2023-06-29 RX ADMIN — WITCH HAZEL 1 PAD: 500 SOLUTION RECTAL; TOPICAL at 09:36

## 2023-06-29 NOTE — UTILIZATION REVIEW
Initial Clinical Review    Admission: Date/Time/Statement:   Admission Orders (From admission, onward)     Ordered        23 2324  Inpatient Admission  Once                      Orders Placed This Encounter   Procedures   • Inpatient Admission     Standing Status:   Standing     Number of Occurrences:   1     Order Specific Question:   Level of Care     Answer:   Med Surg [16]     Order Specific Question:   Estimated length of stay     Answer:   More than 2 Midnights     Order Specific Question:   Certification     Answer:   I certify that inpatient services are medically necessary for this patient for a duration of greater than two midnights. See H&P and MD Progress Notes for additional information about the patient's course of treatment. Chief Complaint   Patient presents with   • Scheduled Induction       Initial Presentation: 25 y.o. female , presented to [facility]", Direct Admission, from home via walk in. Admitted as Inpatient due to IOL after PROM. Date: 2023      female with an JOSE A of 2023, by Last Menstrual Period at 38w5d weeks gestation who is being admitted for PROM. Patient felt gush of fluid this afternoon. Evaluated here at [facility] earlier today for r/o PROM. Initial Nitrazine and Ferning negative, NAVDEEP wnl, and low clinical suspicion for rupture. Discharged home with return precautions. Amnisure was pending at the time of discharge. Amnisure report came back positive. Discussed with patient the high false positive rate and discussed with her the option of induction at this time or close follow up outpatient. Patient opted for admission with IOL. Denies nausea, vomiting, vaginal bleeding, vaginal discharge.    Contractions: Present  Loss of fluid: Yes (PROM)  Vaginal bleeding: No  Fetal movement: Present    Day 2: 2023 @ 0458  Contraction Frequency (minutes): 3  Contraction Quality: Mild  Tachysystole: No   Cervical Dilation: 2-3  Cervical Effacement: 20  Fetal Station: -3  Baseline Rate: 140 bpm  Fetal Heart Rate: 130 BPM  Pt is feeling increasing pain with contractions. SVE as above.  bpm with moderate variability, accelerations, no decelerations. She is requesting an epidural.  We will start Pitocin after epidural placement. 06/28/2023 @ 1045  Dose (bel-units/min) Oxytocin: 8 bel-units/min  Contraction Frequency (minutes): Indeterminate  Contraction Quality: Moderate  Tachysystole: No   Cervical Dilation: 3  Cervical Effacement: 90  Fetal Station: -2  Baseline Rate: 125 bpm  Fetal Heart Rate: 135 BPM  FHR Category: Category I  AROM forebag for clear, bloody fluid. Reposition. Continue to titrate pitocin.    06/28/2023 @ 1623  Dose (bel-units/min) Oxytocin: 6 bel-units/min  Contraction Frequency (minutes): 3  Contraction Quality: Moderate  Tachysystole: No   Cervical Dilation: 4  Cervical Effacement: 90  Fetal Station: -1  Baseline Rate: 130 bpm  Fetal Heart Rate: 135 BPM  FHR Category: Category I  Oxytocin Safety Progress Check: Safety check completed  FHR cat 1  IUPC was displaced into vagina. Replaced. Urinary siddiqui was low: moved to position, off tension  Cervix progressed slightly in dilation and station  Continue oxytocin  Reassurance provided  If amnioinfusion does not leak out and variables are not present, ok to DC infusion and keep IUPC for monitoring only    Day 3: 06/29/2023 06/29/2023 @ 0012  Dose (bel-units/min) Oxytocin: 12 bel-units/min   Contraction Frequency (minutes): 2-4   Contraction Quality: Moderate   Tachysystole: No   Cervical Dilation: 7       Cervical Effacement: 90   Fetal Station: -1   Baseline Rate: 140 bpm   Fetal Heart Rate: 140 BPM   FHR Category: Category I   Oxytocin Safety Progress Check: Safety check completed  Patient feeling increasing pain in perineal region. SVE as above.   Fetal heart tracing category 1 with 140 baseline with moderate variability, accelerations, no decelerations. IUPC replaced at this time. Anesthesia to bolus for pain control. We will continue titrating Pitocin as possible.     2023 @ 0522  Dose (bel-units/min) Oxytocin: 12 bel-units/min  Contraction Frequency (minutes): 1.5-2.5  Contraction Quality: Moderate  Tachysystole: No   Cervical Dilation: 10  Dilation Complete Date: 23  Dilation Complete Time: 0506  Cervical Effacement: 100  Fetal Station: 1  Baseline Rate: 155 bpm  Fetal Heart Rate: 140 BPM  FHR Category: Category I  Oxytocin Safety Progress Check: Safety check completed  Patient feeling increasing vaginal pressure. SVE as above. Fetal heart tracing category 1 with moderate variability, accelerations, no decelerations. We will start pushing at this time. Anticipate       2023 @ 0717  Procedure: Spontaneous Vaginal Delivery, repair of second degree laceration  Anesthesia: Epidural  Findings:   1. Viable male at 12, with APGARS of 8 and 8 at 1 and 5 minutes respectively,  2.  Spontaneous delivery of intact placenta   3. 2 degree laceration repaired with 3-0 and 2-0 Vicryl Rapide       Triage Vitals [23 0008]   Temperature Pulse Respirations Blood Pressure SpO2   99 °F (37.2 °C) (!) 116 18 129/86 --      Temp Source Heart Rate Source Patient Position - Orthostatic VS BP Location FiO2 (%)   Oral Monitor Lying Right arm --      Pain Score       6          Wt Readings from Last 1 Encounters:   23 111 kg (244 lb)     Additional Vital Signs:   Date/Time Temp Pulse Resp BP O2 Device Cardiac (WDL) Patient Position - Orthostatic VS   23 0929 99.5 °F (37.5 °C) 114 Abnormal  18 134/80 None (Room air) WDL --   23 0900 -- 118 Abnormal  18 129/73 -- -- --   23 0829 -- 115 Abnormal  18 128/80 -- -- --   23 0756 99.8 °F (37.7 °C) 129 Abnormal  19 128/73 -- -- --   23 0741 99.6 °F (37.6 °C) 127 Abnormal  19 124/77 -- -- --   23 0727 -- 133 Abnormal  19 127/75 -- -- -- 06/29/23 0724 99.6 °F (37.6 °C) -- -- -- -- -- --   06/29/23 0711 -- 130 Abnormal   18 143/63 -- -- --   Pulse: MD Avina aware at 06/29/23 0711   06/29/23 0701 100.3 °F (37.9 °C) -- -- -- -- -- --   06/29/23 0656 -- 141 Abnormal  -- 131/58 -- -- --   06/29/23 0642 -- 129 Abnormal  -- 136/66 -- -- --   06/29/23 0628 -- 123 Abnormal  -- 125/68 -- -- --   06/29/23 0613 -- 117 Abnormal  -- 126/63 -- -- --   06/29/23 0556 -- 120 Abnormal  -- 119/65 -- -- --   06/29/23 0542 -- 117 Abnormal  -- 119/64 -- -- --   06/29/23 0527 -- 120 Abnormal  -- 119/57 -- -- --   06/29/23 0513 -- 131 Abnormal  -- 113/63 -- -- --   06/29/23 0457 -- 122 Abnormal  -- 130/73 -- -- --   06/29/23 0358 99.4 °F (37.4 °C) 117 Abnormal  -- 136/76 -- -- --   06/29/23 0341 -- 117 Abnormal  -- 125/74 -- -- --   06/29/23 0335 100.1 °F (37.8 °C) -- -- -- -- -- --   06/29/23 0327 -- 118 Abnormal  -- 125/74 -- -- --   06/29/23 0311 -- 116 Abnormal  -- 120/68 -- -- --   06/29/23 0256 -- 117 Abnormal  -- 121/69 -- -- --   06/29/23 0242 99.3 °F (37.4 °C) 121 Abnormal  -- 128/72 -- -- --   06/29/23 0227 -- 127 Abnormal  -- 115/73 -- -- --   06/29/23 0212 -- 120 Abnormal  -- 120/75 -- -- --   06/29/23 0157 -- 129 Abnormal  -- 116/72 -- -- --   06/29/23 0141 98.4 °F (36.9 °C) 129 Abnormal  -- 114/64 -- -- --   06/29/23 0128 -- 137 Abnormal  -- 131/73 -- -- --   06/29/23 0111 -- 125 Abnormal  -- 132/83 -- -- --   06/29/23 0057 -- 122 Abnormal  -- 123/76 -- -- --   06/29/23 0041 -- 126 Abnormal  -- 112/67 -- -- --   06/29/23 0027 98.6 °F (37 °C) 123 Abnormal  -- 121/74 -- -- --   06/29/23 0026 -- 123 Abnormal  -- 121/74 -- -- --   06/28/23 2356 -- 121 Abnormal  -- 130/78 -- -- --   06/28/23 2326 -- 117 Abnormal  -- 119/77 -- -- --   06/28/23 2311 98.5 °F (36.9 °C) 121 Abnormal  18 125/80 -- -- Lying   06/28/23 2256 -- 116 Abnormal  -- 116/74 -- -- --   06/28/23 2241 -- 114 Abnormal  -- 114/76 -- -- --   06/28/23 2226 -- 107 Abnormal  -- 125/76 -- -- -- 06/28/23 2211 -- 111 Abnormal  -- 118/76 -- -- --   06/28/23 2156 -- 108 Abnormal  -- 116/72 -- -- --   06/28/23 2141 -- 105 -- 117/71 -- -- --   06/28/23 2126 98.2 °F (36.8 °C) 108 Abnormal  18 126/76 -- -- Lying   06/28/23 2111 -- 110 Abnormal  -- 120/74 -- -- --   06/28/23 2056 -- 108 Abnormal  -- 129/82 -- -- --   06/28/23 2041 -- 115 Abnormal  -- 124/84 -- -- --   06/28/23 2023 98.4 °F (36.9 °C) 115 Abnormal  198 Abnormal  123/77 -- -- Lying   06/28/23 2020 -- 114 Abnormal  -- 123/80 -- -- --   06/28/23 2017 -- 111 Abnormal  -- 124/83 -- -- --   06/28/23 2014 -- 114 Abnormal  -- 121/78 -- -- --   06/28/23 2011 -- 112 Abnormal  -- 118/77 -- -- --   06/28/23 2008 -- 113 Abnormal  -- 117/73 -- -- --   06/28/23 2005 -- 106 Abnormal  -- 118/73 -- -- --   06/28/23 2002 -- 111 Abnormal  -- 125/81 -- -- --   06/28/23 1959 -- 113 Abnormal  -- 121/77 -- -- --   06/28/23 1956 -- 113 Abnormal  -- 118/73 -- -- --   06/28/23 1951 -- 120 Abnormal  -- 121/66 -- -- --   06/28/23 1925 -- 114 Abnormal  -- 119/77 -- -- --   06/28/23 1911 98.6 °F (37 °C) 107 Abnormal  18 132/76 -- -- Lying   06/28/23 1854 -- 110 Abnormal  -- 114/72 -- -- --   06/28/23 1845 -- -- -- 118/77 -- -- --   06/28/23 1839 -- 111 Abnormal  -- -- -- -- --   06/28/23 1830 -- -- -- 122/82 -- -- --   06/28/23 1815 -- -- -- 122/78 -- -- --   06/28/23 1800 -- -- -- 122/79 -- -- --   06/28/23 1745 -- -- -- 121/77 -- -- --   06/28/23 1730 -- -- -- 120/71 -- -- --   06/28/23 1719 98.7 °F (37.1 °C) -- -- 126/74 -- -- --   06/28/23 1700 -- -- -- 119/60 -- -- --   06/28/23 1600 -- -- -- 131/82 -- -- --   06/28/23 1512 98.6 °F (37 °C) 93 -- 115/77 -- -- --   06/28/23 1454 -- 102 -- 113/63 -- -- --   06/28/23 1439 -- 111 Abnormal  -- 97/56 -- -- --   06/28/23 1424 -- 102 -- 106/58 -- -- --   06/28/23 1409 -- 107 Abnormal  -- 105/56 -- -- --   06/28/23 1354 -- 127 Abnormal  -- 117/60 -- -- --   06/28/23 1340 -- 118 Abnormal  -- 116/66 -- -- --   06/28/23 1324 -- 97 -- 120/58 -- -- --   06/28/23 1309 -- 102 -- 133/60 -- -- --   06/28/23 1300 97.4 °F (36.3 °C) Abnormal  -- -- -- -- -- --   06/28/23 1256 -- 100 -- 149/72 -- -- --   06/28/23 1240 -- 99 -- 129/77 -- -- --   06/28/23 1224 -- 105 -- 110/74 -- -- --   06/28/23 1209 -- 103 -- 111/71 -- -- --   06/28/23 1154 -- 91 -- 118/79 -- -- --   06/28/23 1139 -- 88 -- 114/76 -- -- --   06/28/23 1124 -- -- -- 109/70 -- -- --   06/28/23 1109 -- 109 Abnormal  -- 110/70 -- -- --   06/28/23 1055 -- 114 Abnormal  -- 115/72 -- -- --   06/28/23 1039 -- 101 -- 101/56 -- -- --   06/28/23 1030 98.6 °F (37 °C) -- -- -- -- -- --   06/28/23 1024 -- 100 -- 103/57 -- -- --   06/28/23 1009 -- 103 -- 99/53 -- -- --   06/28/23 0955 -- 102 -- 102/50 -- -- --   06/28/23 0940 -- 116 Abnormal  -- 107/58 -- -- --   06/28/23 0925 -- 114 Abnormal  -- 121/59 -- -- --   06/28/23 0924 -- 114 Abnormal  -- 121/59 -- -- --   06/28/23 0910 -- 112 Abnormal  -- 124/63 -- -- --   06/28/23 0900 -- -- -- 118/58 -- -- --   06/28/23 0854 -- 111 Abnormal  -- -- -- -- --   06/28/23 0840 -- 96 -- 120/61 -- -- --   06/28/23 0824 -- 102 -- 104/60 -- -- --   06/28/23 0809 -- 98 -- 101/59 -- -- --   06/28/23 0800 -- -- -- 100/59 -- -- --   06/28/23 0754 -- 100 -- -- -- -- --   06/28/23 0739 -- 97 -- 94/53 -- -- --   06/28/23 0724 -- 98 -- 106/63 -- -- --   06/28/23 0709 -- 114 Abnormal  -- 93/53 -- -- --   06/28/23 0700 -- -- -- 98/57 -- -- --   06/28/23 0641 -- 115 Abnormal  -- 115/66 -- -- --   06/28/23 0630 99 °F (37.2 °C) -- -- -- -- -- --   06/28/23 0625 -- 108 Abnormal  -- 109/63 -- -- --   06/28/23 0608 -- 114 Abnormal  -- 107/56 -- -- --   06/28/23 0605 -- 118 Abnormal  -- 111/62 -- -- --   06/28/23 0602 -- 117 Abnormal  -- 101/61 -- -- --   06/28/23 0559 -- 118 Abnormal  -- 100/58 -- -- --   06/28/23 0556 -- 122 Abnormal  -- 97/56 -- -- --   06/28/23 0553 -- 126 Abnormal  -- 103/60 -- -- --   06/28/23 0550 -- 115 Abnormal  -- 108/65 -- -- --   06/28/23 0547 -- 113 Abnormal  -- 107/64 -- -- --   06/28/23 0544 -- 125 Abnormal  -- 114/72 -- -- --   06/28/23 0541 -- 112 Abnormal  -- 117/81 -- -- --   06/28/23 0540 -- 109 Abnormal  -- 119/84 -- -- --   06/28/23 0415 99.2 °F (37.3 °C) -- -- -- -- -- --   06/28/23 0230 98.9 °F (37.2 °C) -- -- -- -- -- --           Pertinent Labs/Diagnostic Test Results:   Results from last 7 days   Lab Units 06/28/23  0318   WBC Thousand/uL 10.87*   HEMOGLOBIN g/dL 11.6   HEMATOCRIT % 36.7   PLATELETS Thousands/uL 296     Results from last 7 days   Lab Units 06/28/23  0355   AMPH/METH  Negative   BARBITURATE UR  Negative   BENZODIAZEPINE UR  Negative   COCAINE UR  Negative   METHADONE URINE  Negative   OPIATE UR  Negative   PCP UR  Negative   THC UR  Negative     Past Medical History:   Diagnosis Date   • Cyclical vomiting syndrome    • Depression    • Functional dyspnea    • Migraine    • Varicella     had chicken pox as a child     Present on Admission:  • Depression affecting pregnancy in third trimester, antepartum  • Marijuana use  • Intractable cyclical vomiting with nausea  • Prolonged QT interval syndrome  • Anxiety      Admitting Diagnosis: 38 weeks gestation of pregnancy [Z3A.38]  Age/Sex: 25 y.o. female  Admission Orders:  Regular diet  Up as tolerated    Scheduled Medications:  docusate sodium, 100 mg, Oral, BID  ibuprofen, 600 mg, Oral, Q6H  mirtazapine, 30 mg, Oral, HS  prenatal multivitamin, 1 tablet, Oral, Daily      Continuous IV Infusions:     PRN Meds:  acetaminophen, 650 mg, Oral, Q4H PRN  benzocaine-menthol-lanolin-aloe, 1 Application, Topical, G0B PRN  calcium carbonate, 1,000 mg, Oral, Daily PRN  diphenhydrAMINE, 25 mg, Oral, Q6H PRN  hydrocortisone, 1 Application, Topical, Daily PRN  ondansetron, 4 mg, Intravenous, Q8H PRN  simethicone, 80 mg, Oral, 4x Daily PRN  trimethobenzamide, 200 mg, Intramuscular, Q6H PRN  witch hazel-glycerin, 1 Pad, Topical, Q4H PRN            Network Utilization Review Department  ATTENTION: Please call with any questions or concerns to [phone] and carefully listen to the prompts so that you are directed to the right person. All voicemails are confidential.  [place] all requests for admission clinical reviews, approved or denied determinations and any other requests to dedicated fax number below belonging to the campus where the patient is receiving treatment.  List of dedicated fax numbers for the Facilities:  [place] DENIALS (Administrative/Medical Necessity) [phone] [address] [facility] [phone]   [address] [place] [address] [phone]   [facility] [address] [facility] [phone]   [address] [place] [address] [address] [phone]   [address] [facility] [address] [facility] [address] [phone]

## 2023-06-29 NOTE — UTILIZATION REVIEW
Initial Clinical Review    Admission: Date/Time/Statement:   Admission Orders (From admission, onward)     Ordered        06/27/23 2324  Inpatient Admission  Once                      Orders Placed This Encounter   Procedures   • Inpatient Admission     Standing Status:   Standing     Number of Occurrences:   1     Order Specific Question:   Level of Care     Answer:   Med Surg [16]     Order Specific Question:   Estimated length of stay     Answer:   More than 2 Midnights     Order Specific Question:   Certification     Answer:   I certify that inpatient services are medically necessary for this patient for a duration of greater than two midnights. See H&P and MD Progress Notes for additional information about the patient's course of treatment.      ED Arrival Information     Patient not seen in ED                     Chief Complaint   Patient presents with   • Scheduled Induction       Initial Presentation: 25 y.o. female ***    Date: ***   Day 2: ***    ED Triage Vitals [06/28/23 0008]   Temperature Pulse Respirations Blood Pressure SpO2   99 °F (37.2 °C) (!) 116 18 129/86 --      Temp Source Heart Rate Source Patient Position - Orthostatic VS BP Location FiO2 (%)   Oral Monitor Lying Right arm --      Pain Score       6          Wt Readings from Last 1 Encounters:   06/28/23 111 kg (244 lb)     Additional Vital Signs: ***  Pertinent Labs/Diagnostic Test Results:   No orders to display         Results from last 7 days   Lab Units 06/28/23  0318   WBC Thousand/uL 10.87*   HEMOGLOBIN g/dL 11.6   HEMATOCRIT % 36.7   PLATELETS Thousands/uL 296                                 No results found for: "BETA-HYDROXYBUTYRATE"                                                                                                   Results from last 7 days   Lab Units 06/28/23  0355   AMPH/METH  Negative   BARBITURATE UR  Negative   BENZODIAZEPINE UR  Negative   COCAINE UR  Negative   METHADONE URINE  Negative   OPIATE UR  Negative PCP UR  Negative   THC UR  Negative                                       ED Treatment:   Medication Administration - No Administrations Displayed (No Start Event Found)     None        Past Medical History:   Diagnosis Date   • Cyclical vomiting syndrome    • Depression    • Functional dyspnea    • Migraine    • Varicella     had chicken pox as a child     Present on Admission:  • Depression affecting pregnancy in third trimester, antepartum  • Marijuana use  • Intractable cyclical vomiting with nausea  • Prolonged QT interval syndrome  • Anxiety      Admitting Diagnosis: 38 weeks gestation of pregnancy [Z3A.38]  Age/Sex: 25 y.o. female  Admission Orders:  Scheduled Medications:  docusate sodium, 100 mg, Oral, BID  ibuprofen, 600 mg, Oral, Q6H  mirtazapine, 30 mg, Oral, HS  oxytocin, 250 bel-units/min, Intravenous, Once  prenatal multivitamin, 1 tablet, Oral, Daily      Continuous IV Infusions:     PRN Meds:  acetaminophen, 650 mg, Oral, Q4H PRN  benzocaine-menthol-lanolin-aloe, 1 Application, Topical, A2P PRN  calcium carbonate, 1,000 mg, Oral, Daily PRN  diphenhydrAMINE, 25 mg, Oral, Q6H PRN  hydrocortisone, 1 Application, Topical, Daily PRN  ondansetron, 4 mg, Intravenous, Q8H PRN  simethicone, 80 mg, Oral, 4x Daily PRN  trimethobenzamide, 200 mg, Intramuscular, Q6H PRN  witch hazel-glycerin, 1 Pad, Topical, Q4H PRN        None    Network Utilization Review Department  ATTENTION: Please call with any questions or concerns to [phone] and carefully listen to the prompts so that you are directed to the right person. All voicemails are confidential.  Pippa Cantor all requests for admission clinical reviews, approved or denied determinations and any other requests to dedicated fax number below belonging to the campus where the patient is receiving treatment.  List of dedicated fax numbers for the Facilities:  FACILITY NAME UR FAX NUMBER   ADMISS[place] DENIALS (Administrative/Medical Necessity) [phone]   PARENT [address] [facility] (Maternity/NICU/Pediatrics) [address] [address] [facility] [address] [phone] 150[address] [facility] [address] [address] [facility] [address] [facility] [address] [facility] [facility] [phone]

## 2023-06-29 NOTE — LACTATION NOTE
This note was copied from a baby's chart. CONSULT - LACTATION  Baby Boy Mike Favorite) F[place] 0 days male MRN: [id number]    [address] [place] [address] Room / Bed: NICU 02/NICU 06 Encounter: [id number]    Maternal Information     MOTHER:  Carline Rahman  Maternal Age: 25 y.o.   OB History: # 1 - Date: None, Sex: None, Weight: None, GA: None, Delivery: None, Apgar1: None, Apgar5: None, Living: None, Birth Comments: None   Previouse breast reduction surgery? No    Lactation history:   Has patient previously breast fed: No   How long had patient previously breast fed:     Previous breast feeding complications:       Past Surgical History:   Procedure Laterality Date   • CHOLECYSTECTOMY  07/2018   • TONSILLECTOMY          Birth information:  YOB: 2025   Time of birth: 6:53 AM   Sex: male   Delivery type: Vaginal, Spontaneous   Birth Weight: 3920 g (8 lb 10.3 oz)   Percent of Weight Change: 0%     Gestational Age: 39w0d   [unfilled]    Assessment     Breast and nipple assessment: Widely spaced     06/29/23 1400   Lactation Consultation   Reason for Consult 20;10 minute;15 min   Risk Factors NICU infant   Maternal Information   Has mother  before? No   Breasts/Nipples   Date Pumping Initiated 06/29/23   Time Pumping Initiated 1315   Left Breast Soft  (Widely spaced)   Right Breast Soft  (Widely spaced)   Left Nipple Everted  (History of nipple piercings)   Right Nipple Everted  (History of nipple piercings)   Intervention Hand expression   Breastfeeding Status Yes   Breastfeeding Progress Not yet established; Other issues (comment)   Reasons for not Breastfeeding Infant medical condition   Breast Pump   Pump 3  (Has Lansinoh)   Patient Follow-Up   Lactation Consult Status 2   Follow-Up Type Inpatient;Call as needed   Other OB Lactation Documentation    Additional Problem Noted Baby [place] in NICU for respiratory distress.  Started pumping     Feeding recommendations: breast feed on demand     Discussed use of donor breast milk with Silverio Hare. Literature given. Pumping:   - When pumping, begin in stimulation mode (high cycle, low vacuum) until milk begins to express. Change pump to expression mode (low cycle, high vacuum). Use hands on pumping techniques to assist with milk transfer. When milk stops expressing, change back to stimulation mode. When milk begins to flow, change to expression mode. You may cycle pump up to three times in a pumping session. Instructions given on pumping. Discussed when to start, frequency, different pumps available versus manual expression. Instructions given on pumping. Discussed when to start, frequency, different pumps available versus manual expression. Discussed hygiene of hands and supplies as well as assembly, placement of flanges, size of flanged, preparing the breast and cycles and suction settings on pump. Demonstrated use of hand pump. Discussed labeling of milk, storage, and preparation of stored milk. Mom provided with and discussed RBS, Hand expression/2nd night handout and increase supply for NICU baby. Reviewed pumping log and expectations for pumping output in the first week. Reviewed cycle pumping and appropriate pump settings, as well as pumping for 10-15 min 8-12 times per day. Enc Mom to discussed putting baby to the breast with the NICU team when baby is medically stable to do so. Enc her to call for lactation support as needed throughout her stay. Encouraged parents to call for assistance, questions, and concerns about breastfeeding. Extension provided.     Verdia Hamman, RN 6/29/2023 2:04 PM

## 2023-06-29 NOTE — OB LABOR/OXYTOCIN SAFETY PROGRESS
Oxytocin Safety Progress Check Note - Maricel Sen 25 y.o. female MRN: [id number]    Unit/Bed#: L&D 322-01 Encounter: [id number]    Dose (bel-units/min) Oxytocin: 12 bel-units/min  Contraction Frequency (minutes): 1.5-2.5  Contraction Quality: Moderate  Tachysystole: No   Cervical Dilation: 10  Dilation Complete Date: 23  Dilation Complete Time: 0506  Cervical Effacement: 100  Fetal Station: 1  Baseline Rate: 155 bpm  Fetal Heart Rate: 140 BPM  FHR Category: Category I  Oxytocin Safety Progress Check: Safety check completed        Vital Signs:   Vitals:    23 0358   BP: 136/76   Pulse: (!) 117   Resp:    Temp: 99.4 °F (37.4 °C)       Notes/comments:   Patient feeling increasing vaginal pressure. SVE as above. Fetal heart tracing category 1 with moderate variability, accelerations, no decelerations. We will start pushing at this time.   Anticipate     Dr. Jessy Hidalgo aware    Lucille Limon MD 2023 5:22 AM

## 2023-06-29 NOTE — DISCHARGE SUMMARY
Discharge Summary - OB/GYN   Carline Rahman 25 y.o. female MRN: [id number]  Unit/Bed#: L&D 322-01 Encounter: [id number]      Admission Date: 2023     Discharge Date: 23    Predelivery Diagnosis:  1. Pregnancy at 39 weeks  2. PROM  3. Depression  4. Marijuana use  5. Anxiety  6. Prolonged QT syndrome      Postdelivery Diagnosis:  1. Same as above  2. Delivery of term     Procedures:  Spontaneous vaginal delivery  Second degree perineal laceration repair    Attending: Umer Fatima MD    Hospital Course:     Ms. Carline Rahman is a 25 y.o.  at 39wk0d. She presented to labor and delivery for IOL for PROM. She was given one dose of cytotec. She was then started on pitocin. She receive an epidural for pain control. She received an IUPC and amnioinfusion for variable decelerations. Epidural was replaced as it was dislodged. She continued to make cervical change to completely dilated and began pushing. She delivered a viable male  on 23 at 780 9025. Weight 8lbs 10.3oz via spontaneous vaginal delivery. Apgars were 8 (1 min) and 8 (5 min).  was transferred to  nursery. Patient tolerated the procedure well and was transferred to recovery in stable condition. Her post-partum course was complicated by low urine output which improved with oral hydration. Labs were collected and notable for BERNARD with peak creatinine 1.25 (baseline 0.68) which spontaneously improved with PO hydration. Her post-partum pain was well controlled with oral analgesics. On day of discharge, she was ambulating and able to reasonably perform all ADLs. She was voiding and had appropriate bowel function. Pain was well controlled. She was discharged home on post-partum day #2 without complications. Patient was instructed to follow up with her OB as an outpatient and was given appropriate warnings to call provider if she develops signs of infection or uncontrolled pain.     Complications: none apparent    Condition at discharge: good     Discharge instructions/Information to patient and family:   See after visit summary for information provided to patient and family. Provisions for Follow-Up Care:  See after visit summary for information related to follow-up care and any pertinent home health orders. Disposition: Home    Planned Readmission: No    Discharge Medications: For a complete list of the patient's medications, please refer to her med rec.       Ace Orr MD  OBGYN Resident  07/01/23  7:27 AM

## 2023-06-29 NOTE — L&D DELIVERY NOTE
Vaginal Delivery Summary - OB/GYN   Michele Jensen 25 y.o. female MRN: [id number]  Unit/Bed#: L&D 322-01 Encounter: [id number]          Predelivery Diagnosis:  1. Pregnancy at 39 weeks  2. PROM  3. Depression  4. Marijuana use  5. Anxiety  6. Prolonged QT syndrome     Postdelivery Diagnosis:  1. Same as above  2. Delivery of term     Procedure: Spontaneous Vaginal Delivery, repair of second degree laceration    Attending: Hafsa Saleem    Assistant: Kailyn    Anesthesia: Epidural    QBL: 242 cc  Admission H.6  Admission platelets: 708    Complications: none apparent    Specimens: cord blood, arterial and venous cord blood gasses, placenta to storage    Findings:   1. Viable male at 12, with APGARS of 8 and 8 at 1 and 5 minutes respectively,  2. Spontaneous delivery of intact placenta   3. 2 degree laceration repaired with 3-0 and 2-0 Vicryl Rapide  4. Blood gases:    Umbilical Cord Venous Blood Gas:  Results from last 7 days   Lab Units 23  0655   PH COV  7.337   PCO2 COV mm HG 36.7   HCO3 COV mmol/L 19.2   BASE EXC COV mmol/L -5.8*   O2 CT CD VB mL/dL 11.2   O2 HGB, VENOUS CORD % 12.4     Umbilical Cord Arterial Blood Gas:  Results from last 7 days   Lab Units 23  0655   PH COA  7.304   PCO2 COA  41.7   PO2 COA mm HG 18.3   HCO3 COA mmol/L 20.2   BASE EXC COA mmol/L -5.8*   O2 CONTENT CORD ART ml/dl 8.1   O2 HGB, ARTERIAL CORD % 34.9       Disposition:  Patient tolerated the procedure well and was recovering in labor and delivery room     Brief history and labor course:  Ms. Michele Jensen is a 25 y.o.  at 37wk0d. She presented to labor and delivery for IOL for PROM. She was given one dose of cytotec. She was then started on pitocin. She receive an epidural for pain control. She received an IUPC and amnioinfusion for variable decelerations. Epidural was replaced as it was dislodged. She continued to make cervical change to completely dilated and began pushing.     Description of procedure    After pushing for 1 hour 53 minutes, at 0653 patient delivered a viable male , wt pending, apgars of 8 (1 min) and 8 (5 min). The fetal vertex delivered spontaneously. Baby was checked for nuchal, none noted. The anterior shoulder delivered atraumatically with maternal expulsive forces and the assistance of downward traction. The posterior shoulder delivered with maternal expulsive forces and the assistance of upward traction. The remainder of the fetus delivered spontaneously. Upon delivery, the infant was placed on the mothers abdomen and the cord was clamped and cut. Delayed cord clamping was performed. The infant was noted to cry spontaneously and was moving all extremities appropriately. There was no evidence for injury. Awaiting nurse resuscitators evaluated the . Arterial and venous cord blood gases and cord blood was collected for analysis. These were promptly sent to the lab. In the immediate post-partum, 30 units of IV pitocin was administered, and the uterus was noted to contract down well with massage and pitocin. The placenta delivered spontaneously at 6938 and was noted to have a centrally inserted 3 vessel cord. The vagina, cervix, perineum, and rectum were inspected and there was noted to be a second degree laceration that was repaired with 3-0 and 2-0 Vicryl Rapide. At the conclusion of the procedure, all needle, sponge, and instrument counts were noted to be correct. Patient tolerated the procedure well and was allowed to recover in labor and delivery room with family and  before being transferred to the post-partum floor. Dr. Jolene Gibbons was present and participated in all key portions of the case.     Stella Deleon MD  2023  7:18 AM

## 2023-06-29 NOTE — CASE MANAGEMENT
Case Management Progress Note    Patient name Valerie Nava  Location L&D 314/L&D 875-38 MRN [id number]  : 1995 Date 2023       LOS (days): 2  Geometric Mean LOS (GMLOS) (days):   Days to GMLOS:        OBJECTIVE:        Current admission status: Inpatient  Preferred Pharmacy:   [address] [place] [address],[place] [address]  Phone: [phone] Fax: [phone]    Primary Care Provider: Elsy Cabrera MD    Primary Insurance: Sandra Ceja  Secondary Insurance:     PROGRESS NOTE:  Consult(s):  NICU assessment      · Delivery method/date: 23    · Gestational age 36w0d  · Admitted to NICU for respiratory distress on CPAP    CM met w/MOB who provided the following information:      · Baby's name/gender: Baby Boy Fahringer   · Mother of baby:  Valerie Nava   · Father of baby//SO: [facility]   · Other Legal Guardian(s) for baby: N/A  · Alternate emergency contact: N/A  · Other children: N/A  · Lives with: FOB  · Baby Supplies: MOB confirmed she has all needed baby supplies. · Bottle or Breast Feeding: Breast feeding  · Breast Pump if breast feeding: Lactation consult pending. CM to follow for breast pump needs. · Government Assistance Programs/WIC/EBT/SSI: Denies   · Work/School: Both MOB/FOB are employed   · Transportation:  Both MOB/FOB drive   · Prenatal care: [facility]   · Pediatrician: Evelyn Thomas   · Mental Health Hx or Treatment: Diagnoses of depression and anxiety. MOB denies any medication management or counseling at this time. Not interested in resources at this time. · Substance Abuse: MOB has history of THC use. MOB reported she last used before she found out she was pregnant. MOB's UDS negative. CM informed MOB of UDS results and explained sending of cord blood for additional testing. CM informed MOB that if cord blood results are positive, CM will call MOB and place a referral to [facility].  MOB expressed understanding. · Hx DV/IPV: MOB denies   · Community Referrals/C&Y/NFP:  MOB denies   · Legal (parole/probation/incarceration): MOB denies   · Insurance for baby: FOB intends to add baby to his insurance ([employer])  · NICU Resources/[facility]: Provided resource list to MOB/FOB and encouraged to reach out to CM with any questions/needs. MOB denies any other CM needs at this time. CM will follow infant in NICU through discharge.

## 2023-06-29 NOTE — PROGRESS NOTES
Vitals:    06/29/23 0929   BP: 134/80   Pulse: (!) 114   Resp: 18   Temp: 99.5 °F (37.5 °C)     Patient seen and examined at bedside for concern of increased vaginal pain  Pelvic exam completed with nurse present, intact suture on exam from laceration repair, no signs of hematoma or other fluid collection  Pain secondary to inflammation status post recent delivery  Encourage application of ice to affected area and Dermoplast for symptom relief  Plan for 1 dose of IV Toradol at 1 PM for continued management of pain      Dave Bull MD  OB/GYN PGY-2

## 2023-06-29 NOTE — OB LABOR/OXYTOCIN SAFETY PROGRESS
Oxytocin Safety Progress Check Note - Nancy Starks 25 y.o. female MRN: [id number]    Unit/Bed#: L&D 322-01 Encounter: [id number]    Dose (bel-units/min) Oxytocin: 12 bel-units/min  Contraction Frequency (minutes): 2-3  Contraction Quality: Moderate  Tachysystole: No   Cervical Dilation: Lip/rim (Comment)        Cervical Effacement: 100  Fetal Station: 0  Baseline Rate: 140 bpm  Fetal Heart Rate: 140 BPM  FHR Category: Category I  Oxytocin Safety Progress Check: Safety check completed        Vital Signs:   Vitals:    23 0242   BP: 128/72   Pulse: (!) 121   Resp:    Temp: 99.3 °F (37.4 °C)       Notes/comments:   Patient resting comfortably. SVE as above. Fetal heart tracing    40 baseline with moderate variability, accelerations, no decelerations, ever every 2 to 3 minutes. We will continue monitoring at this time. We will start pushing at 10 cm dilated. Anticipate .     Dr. Gutiérrez Prudent aware    Sheri Escobar MD 2023 3:14 AM

## 2023-06-29 NOTE — PLAN OF CARE
Problem: ANTEPARTUM  Goal: Maintain pregnancy as long as maternal and/or fetal condition is stable  Description: INTERVENTIONS:  - Maternal surveillance  - Fetal surveillance  - Monitor uterine activity  - Medications as ordered  - Bedrest  Outcome: Completed     Problem: BIRTH - VAGINAL/ SECTION  Goal: Fetal and maternal status remain reassuring during the birth process  Description: INTERVENTIONS:  - Monitor vital signs  - Monitor fetal heart rate  - Monitor uterine activity  - Monitor labor progression (vaginal delivery)  - DVT prophylaxis  - Antibiotic prophylaxis  Outcome: Completed  Goal: Emotionally satisfying birthing experience for mother/fetus  Description: Interventions:  - Assess, plan, implement and evaluate the nursing care given to the patient in labor  - Advocate the philosophy that each childbirth experience is a unique experience and support the family's chosen level of involvement and control during the labor process   - Actively participate in both the patient's and family's teaching of the birth process  - Consider cultural, Caodaism and age-specific factors and plan care for the patient in labor  Outcome: Completed

## 2023-06-29 NOTE — ANESTHESIA POSTPROCEDURE EVALUATION
Post-Op Assessment Note    CV Status:  Stable  Pain Score: 0    Pain management: adequate     Mental Status:  Alert and awake   Hydration Status:  Euvolemic and stable   PONV Controlled:  Controlled   Airway Patency:  Patent      Post Op Vitals Reviewed: Yes      Staff: Anesthesiologist     Post-op block assessment: site cleaned, catheter intact and no complications      No notable events documented      /77 (06/29/23 0741)    Temp 99 6 °F (37 6 °C) (06/29/23 0741)    Pulse (!) 127 (06/29/23 0741)   Resp 19 (06/29/23 0741)    SpO2

## 2023-06-29 NOTE — OB LABOR/OXYTOCIN SAFETY PROGRESS
Oxytocin Safety Progress Check Note - Doug Osorio 25 y.o. female MRN: [id number]    Unit/Bed#: L&D 322-01 Encounter: [id number]    Dose (bel-units/min) Oxytocin: 12 bel-units/min  Contraction Frequency (minutes): 2-4  Contraction Quality: Moderate  Tachysystole: No   Cervical Dilation: 5        Cervical Effacement: 90  Fetal Station: -1  Baseline Rate: 130 bpm  Fetal Heart Rate: 130 BPM  FHR Category: Category I  Oxytocin Safety Progress Check: Safety check completed        Vital Signs:   Vitals:    06/28/23 2023   BP:    Pulse:    Resp:    Temp: 98.4 °F (36.9 °C)       Notes/comments:   Pt feeling increasing vaginal pressure. SVE as above. FHT cat 1 with moderate variability, accelerations, no decelerations, contractions q2-3 mins. Epidural to be replaced as the first one dislodged.     Dr. Munoz Pac aware     Miguel Lantigua MD 6/28/2023 10:03 PM

## 2023-06-29 NOTE — OB LABOR/OXYTOCIN SAFETY PROGRESS
Oxytocin Safety Progress Check Note - Mario Alberto Speak 25 y.o. female MRN: [id number]    Unit/Bed#: L&D 322-01 Encounter: [id number]    Dose (bel-units/min) Oxytocin: 12 bel-units/min  Contraction Frequency (minutes): 2-4  Contraction Quality: Moderate  Tachysystole: No   Cervical Dilation: 7        Cervical Effacement: 90  Fetal Station: -1  Baseline Rate: 140 bpm  Fetal Heart Rate: 140 BPM  FHR Category: Category I  Oxytocin Safety Progress Check: Safety check completed          Vital Signs:   Vitals:    06/28/23 2023   BP:    Pulse:    Resp:    Temp: 98.4 °F (36.9 °C)       Notes/comments:   Patient feeling increasing pain in perineal region. SVE as above. Fetal heart tracing category 1 with 140 baseline with moderate variability, accelerations, no decelerations. IUPC replaced at this time. Anesthesia to bolus for pain control. We will continue titrating Pitocin as possible.     Dr. Eliana Urbina aware    Margoth Velazco MD 6/29/2023 12:12 AM

## 2023-06-30 PROBLEM — R34 URINE OUTPUT LOW: Status: ACTIVE | Noted: 2023-06-30

## 2023-06-30 LAB
ALBUMIN SERPL BCP-MCNC: 2.7 G/DL (ref 3.5–5)
ALP SERPL-CCNC: 95 U/L (ref 34–104)
ALT SERPL W P-5'-P-CCNC: 17 U/L (ref 7–52)
ANION GAP SERPL CALCULATED.3IONS-SCNC: 10 MMOL/L
ANION GAP SERPL CALCULATED.3IONS-SCNC: 6 MMOL/L
AST SERPL W P-5'-P-CCNC: 24 U/L (ref 13–39)
BILIRUB DIRECT SERPL-MCNC: 0.04 MG/DL (ref 0–0.2)
BILIRUB SERPL-MCNC: 0.26 MG/DL (ref 0.2–1)
BUN SERPL-MCNC: 18 MG/DL (ref 5–25)
BUN SERPL-MCNC: 20 MG/DL (ref 5–25)
CALCIUM SERPL-MCNC: 8.5 MG/DL (ref 8.4–10.2)
CALCIUM SERPL-MCNC: 8.9 MG/DL (ref 8.4–10.2)
CHLORIDE SERPL-SCNC: 107 MMOL/L (ref 96–108)
CHLORIDE SERPL-SCNC: 109 MMOL/L (ref 96–108)
CO2 SERPL-SCNC: 21 MMOL/L (ref 21–32)
CO2 SERPL-SCNC: 23 MMOL/L (ref 21–32)
CREAT SERPL-MCNC: 1.07 MG/DL (ref 0.6–1.3)
CREAT SERPL-MCNC: 1.25 MG/DL (ref 0.6–1.3)
CREAT UR-MCNC: 173.7 MG/DL
ERYTHROCYTE [DISTWIDTH] IN BLOOD BY AUTOMATED COUNT: 14.6 % (ref 11.6–15.1)
ERYTHROCYTE [DISTWIDTH] IN BLOOD BY AUTOMATED COUNT: 14.6 % (ref 11.6–15.1)
GFR SERPL CREATININE-BSD FRML MDRD: 57 ML/MIN/1.73SQ M
GFR SERPL CREATININE-BSD FRML MDRD: 69 ML/MIN/1.73SQ M
GLUCOSE SERPL-MCNC: 71 MG/DL (ref 65–140)
GLUCOSE SERPL-MCNC: 97 MG/DL (ref 65–140)
HCT VFR BLD AUTO: 26.1 % (ref 34.8–46.1)
HCT VFR BLD AUTO: 27.7 % (ref 34.8–46.1)
HGB BLD-MCNC: 8.2 G/DL (ref 11.5–15.4)
HGB BLD-MCNC: 8.6 G/DL (ref 11.5–15.4)
MCH RBC QN AUTO: 25.7 PG (ref 26.8–34.3)
MCH RBC QN AUTO: 25.9 PG (ref 26.8–34.3)
MCHC RBC AUTO-ENTMCNC: 31 G/DL (ref 31.4–37.4)
MCHC RBC AUTO-ENTMCNC: 31.4 G/DL (ref 31.4–37.4)
MCV RBC AUTO: 83 FL (ref 82–98)
MCV RBC AUTO: 83 FL (ref 82–98)
PLATELET # BLD AUTO: 293 THOUSANDS/UL (ref 149–390)
PLATELET # BLD AUTO: 348 THOUSANDS/UL (ref 149–390)
PMV BLD AUTO: 10.3 FL (ref 8.9–12.7)
PMV BLD AUTO: 10.3 FL (ref 8.9–12.7)
POTASSIUM SERPL-SCNC: 4 MMOL/L (ref 3.5–5.3)
POTASSIUM SERPL-SCNC: 4.3 MMOL/L (ref 3.5–5.3)
PROT SERPL-MCNC: 5.2 G/DL (ref 6.4–8.4)
RBC # BLD AUTO: 3.16 MILLION/UL (ref 3.81–5.12)
RBC # BLD AUTO: 3.34 MILLION/UL (ref 3.81–5.12)
SODIUM 24H UR-SCNC: 75 MOL/L
SODIUM SERPL-SCNC: 138 MMOL/L (ref 135–147)
SODIUM SERPL-SCNC: 138 MMOL/L (ref 135–147)
WBC # BLD AUTO: 15.94 THOUSAND/UL (ref 4.31–10.16)
WBC # BLD AUTO: 16.6 THOUSAND/UL (ref 4.31–10.16)

## 2023-06-30 PROCEDURE — 80076 HEPATIC FUNCTION PANEL: CPT

## 2023-06-30 PROCEDURE — 80048 BASIC METABOLIC PNL TOTAL CA: CPT

## 2023-06-30 PROCEDURE — 84300 ASSAY OF URINE SODIUM: CPT

## 2023-06-30 PROCEDURE — 82570 ASSAY OF URINE CREATININE: CPT

## 2023-06-30 PROCEDURE — 85027 COMPLETE CBC AUTOMATED: CPT

## 2023-06-30 PROCEDURE — 99024 POSTOP FOLLOW-UP VISIT: CPT | Performed by: OBSTETRICS & GYNECOLOGY

## 2023-06-30 RX ORDER — LIDOCAINE 50 MG/G
1 PATCH TOPICAL DAILY PRN
Status: DISCONTINUED | OUTPATIENT
Start: 2023-06-30 | End: 2023-07-01 | Stop reason: HOSPADM

## 2023-06-30 RX ORDER — MUSCLE RUB CREAM 100; 150 MG/G; MG/G
CREAM TOPICAL 4 TIMES DAILY PRN
Status: DISCONTINUED | OUTPATIENT
Start: 2023-06-30 | End: 2023-07-01 | Stop reason: HOSPADM

## 2023-06-30 RX ORDER — LIDOCAINE 40 MG/G
CREAM TOPICAL 4 TIMES DAILY PRN
Status: DISCONTINUED | OUTPATIENT
Start: 2023-06-30 | End: 2023-06-30

## 2023-06-30 RX ORDER — FERROUS SULFATE 325(65) MG
325 TABLET ORAL
Status: DISCONTINUED | OUTPATIENT
Start: 2023-06-30 | End: 2023-07-01 | Stop reason: HOSPADM

## 2023-06-30 RX ADMIN — ACETAMINOPHEN 325MG 650 MG: 325 TABLET ORAL at 17:54

## 2023-06-30 RX ADMIN — IBUPROFEN 600 MG: 600 TABLET, FILM COATED ORAL at 01:44

## 2023-06-30 RX ADMIN — DOCUSATE SODIUM 100 MG: 100 CAPSULE, LIQUID FILLED ORAL at 08:11

## 2023-06-30 RX ADMIN — PRENATAL VIT W/ FE FUMARATE-FA TAB 27-0.8 MG 1 TABLET: 27-0.8 TAB at 08:11

## 2023-06-30 RX ADMIN — IBUPROFEN 600 MG: 600 TABLET, FILM COATED ORAL at 08:11

## 2023-06-30 RX ADMIN — DOCUSATE SODIUM 100 MG: 100 CAPSULE, LIQUID FILLED ORAL at 17:54

## 2023-06-30 RX ADMIN — IBUPROFEN 600 MG: 600 TABLET, FILM COATED ORAL at 13:01

## 2023-06-30 RX ADMIN — FERROUS SULFATE TAB 325 MG (65 MG ELEMENTAL FE) 325 MG: 325 (65 FE) TAB at 10:26

## 2023-06-30 RX ADMIN — LIDOCAINE 1 PATCH: 700 PATCH TOPICAL at 18:49

## 2023-06-30 RX ADMIN — MIRTAZAPINE 30 MG: 30 TABLET, FILM COATED ORAL at 22:16

## 2023-06-30 NOTE — ASSESSMENT & PLAN NOTE
Concern for low UOP after delivery, straight cath'd for 450 mL. Now voiding spontaneously with adequate urine output.

## 2023-06-30 NOTE — PLAN OF CARE
Problem: POSTPARTUM  Goal: Experiences normal postpartum course  Description: INTERVENTIONS:  - Monitor maternal vital signs  - Assess uterine involution and lochia  Outcome: Progressing  Goal: Appropriate maternal -  bonding  Description: INTERVENTIONS:  - Identify family support  - Assess for appropriate maternal/infant bonding   -Encourage maternal/infant bonding opportunities  - Referral to  or  as needed  Outcome: Progressing  Goal: Establishment of infant feeding pattern  Description: INTERVENTIONS:  - Assess breast/bottle feeding  - Refer to lactation as needed  Outcome: Progressing  Goal: Incision(s), wounds(s) or drain site(s) healing without S/S of infection  Description: INTERVENTIONS  - Assess and document dressing, incision, wound bed, drain sites and surrounding tissue  - Provide patient and family education  - Perform skin care/dressing changes every come: Progressing

## 2023-06-30 NOTE — ASSESSMENT & PLAN NOTE
Pain well controlled  Voiding spontaneously  Appropriate bowel function  Tolerating PO fluids and solids  Bottle feeding  Lochia wnl  • Continue routine postpartum care

## 2023-06-30 NOTE — PROGRESS NOTES
Progress Note - OB/GYN  Braeden Fontanez 25 y.o. female MRN: [id number]  Unit/Bed#: L&D 437-52 Encounter: [id number]    Assessment and Plan     Braeden Fontanez is a patient of: [facility] (Dr. Krish Mooney, Dr. Lashawn Galicia, Dr. Carolina Suarez). She is PPD# 1 s/p  spontaneous vaginal delivery  Recovering well and is stable       Urine output low  Assessment & Plan  Concern for low UOP yesterday  Straight cath for 450cc yesterday afternoon  150cc output overnight  Plan for CBC/BMP this am and urine sodium  Encouraged continued PO hydration    Depression affecting pregnancy in third trimester, antepartum  Assessment & Plan  On Remeron 30mg daily    Anxiety  Assessment & Plan  On Remeron 30 mg HS    Prolonged QT interval syndrome  Assessment & Plan  Avoid excess use of Zofran, Reglan and other QT prolonging medications    Intractable cyclical vomiting with nausea  Assessment & Plan  No active symptoms  As needed antiemetic    *  (spontaneous vaginal delivery)  Assessment & Plan  Pain well-controlled  Voiding spontaneously with low output, passing flatus  Encouraged breastfeeding  Encouraged ambulation        Disposition    - Anticipate discharge home on PPD# 1/2      Subjective/Objective     Chief Complaint: Postpartum State     Subjective:    Braeden Fontanez is PPD/POD#1 s/p  spontaneous vaginal delivery. She has no current complaints. Pain is well controlled. Patient is currently voiding. She is ambulating. Patient is currently passing flatus and has had bowel movement. She is tolerating PO, and denies nausea or vomitting. Patient denies fever, chills, chest pain, shortness of breath, or calf tenderness. Lochia is normal. She is  Breastfeeding. She is recovering well and is stable.        Vitals:   /63 (BP Location: Right arm)   Pulse 102   Temp 97.5 °F (36.4 °C) (Temporal)   Resp 18   Ht 5' 5" (1.651 m)   Wt 111 kg (244 lb)   LMP 2022 (Exact Date)   Breastfeeding Yes   BMI 40.60 kg/m² Intake/Output Summary (Last 24 hours) at 6/30/2023 0653  Last data filed at 6/30/2023 0140  Gross per 24 hour   Intake --   Output 794 ml   Net -794 ml       Invasive Devices     Intrauterine Pressure Catheter  Duration           Intrauterine Pressure Catheter 06/28/23 1300 1 day                Physical Exam:   GEN: Elizabet Ortega appears well, alert and oriented x 3, pleasant and cooperative   CARDIO: RRR, no murmurs or rubs  RESP:  CTAB, no wheezes or rales  ABDOMEN: soft, no tenderness, no distention, fundus @ umbilicus  EXTREMITIES: SCDs on, non tender, no erythema, b/l Chanel's sign negative      Labs:     Hemoglobin   Date Value Ref Range Status   06/28/2023 11.6 11.5 - 15.4 g/dL Final   04/07/2023 12.3 11.5 - 15.4 g/dL Final   06/26/2015 13.8 11.5 - 15.4 g/dL Final     WBC   Date Value Ref Range Status   06/28/2023 10.87 (H) 4.31 - 10.16 Thousand/uL Final   04/07/2023 13.12 (H) 4.31 - 10.16 Thousand/uL Final   06/26/2015 7.96 4.31 - 10.16 Thousand/uL Final     Platelets   Date Value Ref Range Status   06/28/2023 296 149 - 390 Thousands/uL Final   04/07/2023 335 149 - 390 Thousands/uL Final   06/26/2015 297 149 - 390 Thousand/uL Final     Creatinine   Date Value Ref Range Status   12/07/2022 0.68 0.60 - 1.30 mg/dL Final     Comment:     Standardized to IDMS reference method   11/13/2022 0.74 0.60 - 1.30 mg/dL Final     Comment:     Standardized to IDMS reference method   06/26/2015 0.63 0.60 - 1.30 mg/dL Final     Comment:     Standardized to IDMS reference method     AST   Date Value Ref Range Status   12/07/2022 12 5 - 45 U/L Final     Comment:     Specimen collection should occur prior to Sulfasalazine administration due to the potential for falsely depressed results. 11/13/2022 18 5 - 45 U/L Final     Comment:     Specimen collection should occur prior to Sulfasalazine administration due to the potential for falsely depressed results.     06/26/2015 15 0 - 45 U/L Final     ALT   Date Value Ref Range Status   2022 23 12 - 78 U/L Final     Comment:     Specimen collection should occur prior to Sulfasalazine and/or Sulfapyridine administration due to the potential for falsely depressed results. 2022 38 12 - 78 U/L Final     Comment:     Specimen collection should occur prior to Sulfasalazine administration due to the potential for falsely depressed results.     2015 24 14 - 59 U/L Final          Theola Apgar, MD  2023  6:53 AM

## 2023-07-01 VITALS
HEART RATE: 97 BPM | DIASTOLIC BLOOD PRESSURE: 85 MMHG | RESPIRATION RATE: 16 BRPM | WEIGHT: 244 LBS | OXYGEN SATURATION: 99 % | SYSTOLIC BLOOD PRESSURE: 136 MMHG | HEIGHT: 65 IN | BODY MASS INDEX: 40.65 KG/M2 | TEMPERATURE: 98 F

## 2023-07-01 PROBLEM — R50.9 ELEVATED TEMPERATURE: Status: ACTIVE | Noted: 2023-07-01

## 2023-07-01 PROBLEM — N17.9 AKI (ACUTE KIDNEY INJURY) (HCC): Status: ACTIVE | Noted: 2023-07-01

## 2023-07-01 LAB
ANION GAP SERPL CALCULATED.3IONS-SCNC: 8 MMOL/L
BUN SERPL-MCNC: 18 MG/DL (ref 5–25)
CALCIUM SERPL-MCNC: 8.3 MG/DL (ref 8.4–10.2)
CHLORIDE SERPL-SCNC: 107 MMOL/L (ref 96–108)
CO2 SERPL-SCNC: 23 MMOL/L (ref 21–32)
CREAT SERPL-MCNC: 0.98 MG/DL (ref 0.6–1.3)
ERYTHROCYTE [DISTWIDTH] IN BLOOD BY AUTOMATED COUNT: 14.7 % (ref 11.6–15.1)
GFR SERPL CREATININE-BSD FRML MDRD: 77 ML/MIN/1.73SQ M
GLUCOSE SERPL-MCNC: 72 MG/DL (ref 65–140)
HCT VFR BLD AUTO: 26.1 % (ref 34.8–46.1)
HGB BLD-MCNC: 8.1 G/DL (ref 11.5–15.4)
MCH RBC QN AUTO: 26 PG (ref 26.8–34.3)
MCHC RBC AUTO-ENTMCNC: 31 G/DL (ref 31.4–37.4)
MCV RBC AUTO: 84 FL (ref 82–98)
PLATELET # BLD AUTO: 337 THOUSANDS/UL (ref 149–390)
PMV BLD AUTO: 10.3 FL (ref 8.9–12.7)
POTASSIUM SERPL-SCNC: 3.8 MMOL/L (ref 3.5–5.3)
RBC # BLD AUTO: 3.11 MILLION/UL (ref 3.81–5.12)
SODIUM SERPL-SCNC: 138 MMOL/L (ref 135–147)
WBC # BLD AUTO: 12.87 THOUSAND/UL (ref 4.31–10.16)

## 2023-07-01 PROCEDURE — 80048 BASIC METABOLIC PNL TOTAL CA: CPT

## 2023-07-01 PROCEDURE — 85027 COMPLETE CBC AUTOMATED: CPT

## 2023-07-01 PROCEDURE — 99024 POSTOP FOLLOW-UP VISIT: CPT | Performed by: OBSTETRICS & GYNECOLOGY

## 2023-07-01 RX ORDER — ACETAMINOPHEN AND CODEINE PHOSPHATE 120; 12 MG/5ML; MG/5ML
1 SOLUTION ORAL DAILY
Qty: 28 TABLET | Refills: 1 | Status: SHIPPED | OUTPATIENT
Start: 2023-07-01 | End: 2023-07-01

## 2023-07-01 RX ORDER — IBUPROFEN 600 MG/1
600 TABLET ORAL EVERY 6 HOURS PRN
Qty: 30 TABLET | Refills: 0 | Status: SHIPPED | OUTPATIENT
Start: 2023-07-01

## 2023-07-01 RX ORDER — IBUPROFEN 600 MG/1
600 TABLET ORAL EVERY 6 HOURS PRN
Status: DISCONTINUED | OUTPATIENT
Start: 2023-07-01 | End: 2023-07-01 | Stop reason: HOSPADM

## 2023-07-01 RX ORDER — DOCUSATE SODIUM 100 MG/1
100 CAPSULE, LIQUID FILLED ORAL 2 TIMES DAILY PRN
Refills: 0
Start: 2023-07-01

## 2023-07-01 RX ORDER — ACETAMINOPHEN 325 MG/1
650 TABLET ORAL EVERY 6 HOURS PRN
Refills: 0
Start: 2023-07-01

## 2023-07-01 RX ADMIN — ACETAMINOPHEN 325MG 650 MG: 325 TABLET ORAL at 13:15

## 2023-07-01 RX ADMIN — DOCUSATE SODIUM 100 MG: 100 CAPSULE, LIQUID FILLED ORAL at 08:48

## 2023-07-01 RX ADMIN — IBUPROFEN 600 MG: 600 TABLET, FILM COATED ORAL at 10:32

## 2023-07-01 RX ADMIN — ACETAMINOPHEN 325MG 650 MG: 325 TABLET ORAL at 00:12

## 2023-07-01 RX ADMIN — PRENATAL VIT W/ FE FUMARATE-FA TAB 27-0.8 MG 1 TABLET: 27-0.8 TAB at 08:48

## 2023-07-01 RX ADMIN — FERROUS SULFATE TAB 325 MG (65 MG ELEMENTAL FE) 325 MG: 325 (65 FE) TAB at 08:48

## 2023-07-01 NOTE — ASSESSMENT & PLAN NOTE
Temp 100.3 F intrapartum. Did not meet criteria for chorioamnionitis. Given single dose of Unasyn. Postpartum, no fundal tenderness, low concern for intrauterine infection.   · Continue routine postpartum care

## 2023-07-01 NOTE — ASSESSMENT & PLAN NOTE
Baseline Cr 0.68.   Cr resolving with PO hydration: 1.25 --> 1.07 --> 0.98 7/1 am.  · No further monitoring needed  · Continue PO hydration

## 2023-07-01 NOTE — NURSING NOTE
Maternal and  education provided. All questions answered. Save Your Life Magnet review. Pt reminded about videos and post-partum depression screening.

## 2023-07-01 NOTE — PROGRESS NOTES
Progress Note - OB/GYN  Pat Plata 25 y.o. female MRN: [id number]  Unit/Bed#: L&D 548-06 Encounter: [id number]    Assessment and Plan:  Pat Plata is a patient of: Dr. Antony Burris. She is PPD# 2 s/p  spontaneous vaginal delivery. Recovering well and is stable. By problem:      *  (spontaneous vaginal delivery)  Assessment & Plan  Pain well controlled  Voiding spontaneously  Appropriate bowel function  Tolerating PO fluids and solids  Bottle feeding  Lochia wnl  • Continue routine postpartum care      Elevated temperature  Assessment & Plan  Temp 100.3 F intrapartum. Did not meet criteria for chorioamnionitis. Given single dose of Unasyn. Postpartum, no fundal tenderness, low concern for intrauterine infection. · Continue routine postpartum care    BERNARD (acute kidney injury) ([address])  Assessment & Plan  Baseline Cr 0.68. Cr resolving with PO hydration: 1.25 --> 1.07 --> 0.98 7/1 am.  · No further monitoring needed  · Continue PO hydration    Urine output low  Assessment & Plan  Concern for low UOP after delivery, straight cath'd for 450 mL. Now voiding spontaneously with adequate urine output. Depression affecting pregnancy in third trimester, antepartum  Assessment & Plan  · Continue home  Remeron 30 mg daily    Anxiety  Assessment & Plan  · Continue home Remeron 30 mg qhs    Prolonged QT interval syndrome  Assessment & Plan  · Avoid excess use of Zofran, Reglan, and other QT prolonging medications. Disposition    - Anticipate discharge home on PPD# 2 pending labs      Subjective/Objective     Chief Complaint: Postpartum State     Subjective:    Pat Plata is PPD#2 s/p  spontaneous vaginal delivery. She has no current complaints. Pain is well controlled. Patient is currently voiding. She is ambulating. Patient is currently passing flatus and has had bowel movement. She is tolerating PO, and denies nausea or vomitting.  Patient denies fever, chills, chest pain, shortness of breath, or calf tenderness. Lochia is normal. She is  Bottle feeding. She is recovering well and is stable.        Vitals:   /85 (BP Location: Right arm)   Pulse 100   Temp 98.7 °F (37.1 °C) (Oral)   Resp 21   Ht 5' 5" (1.651 m)   Wt 111 kg (244 lb)   LMP 09/29/2022 (Exact Date)   SpO2 98%   Breastfeeding Yes   BMI 40.60 kg/m²       Intake/Output Summary (Last 24 hours) at 7/1/2023 8671  Last data filed at 7/1/2023 0601  Gross per 24 hour   Intake --   Output 2200 ml   Net -2200 ml       Invasive Devices     Intrauterine Pressure Catheter  Duration           Intrauterine Pressure Catheter 06/28/23 1300 2 days                Physical Exam:   GEN: Eddy Coffey appears well, alert and oriented x 3, pleasant and cooperative   CARDIO: warm and well-perfused  RESP: non-labored breathing  ABDOMEN: soft, no tenderness, no distention, fundus @ U -2  EXTREMITIES: SCDs on, non tender, no erythema, b/l Chanel's sign negative      Labs:     Hemoglobin   Date Value Ref Range Status   07/01/2023 8.1 (L) 11.5 - 15.4 g/dL Final   06/30/2023 8.6 (L) 11.5 - 15.4 g/dL Final   06/26/2015 13.8 11.5 - 15.4 g/dL Final     WBC   Date Value Ref Range Status   07/01/2023 12.87 (H) 4.31 - 10.16 Thousand/uL Final   06/30/2023 15.94 (H) 4.31 - 10.16 Thousand/uL Final   06/26/2015 7.96 4.31 - 10.16 Thousand/uL Final     Platelets   Date Value Ref Range Status   07/01/2023 337 149 - 390 Thousands/uL Final   06/30/2023 348 149 - 390 Thousands/uL Final   06/26/2015 297 149 - 390 Thousand/uL Final     Creatinine   Date Value Ref Range Status   07/01/2023 0.98 0.60 - 1.30 mg/dL Final     Comment:     Standardized to IDMS reference method   06/30/2023 1.07 0.60 - 1.30 mg/dL Final     Comment:     Standardized to IDMS reference method   06/26/2015 0.63 0.60 - 1.30 mg/dL Final     Comment:     Standardized to IDMS reference method     AST   Date Value Ref Range Status   06/30/2023 24 13 - 39 U/L Final   12/07/2022 12 5 - 45 U/L Final Comment:     Specimen collection should occur prior to Sulfasalazine administration due to the potential for falsely depressed results. 06/26/2015 15 0 - 45 U/L Final     ALT   Date Value Ref Range Status   06/30/2023 17 7 - 52 U/L Final     Comment:     Specimen collection should occur prior to Sulfasalazine administration due to the potential for falsely depressed results. 12/07/2022 23 12 - 78 U/L Final     Comment:     Specimen collection should occur prior to Sulfasalazine and/or Sulfapyridine administration due to the potential for falsely depressed results.     06/26/2015 24 14 - 59 U/L Final          Keisha Chew MD  7/1/2023  7:14 AM

## 2023-07-01 NOTE — PROGRESS NOTES
Called to patient's room for low back and buttock pain. Patient denies perineal pain and reports "aching" in her low back and buttocks. She has been sitting or lying reclined most of the day and hasn't ambulated much due to feet swelling and her sandals not fitting. Vitals:    06/30/23 0700   BP: 100/55   Pulse: 92   Resp: 18   Temp: 97.8 °F (36.6 °C)   SpO2: 97%       Physical exam notable for normal skin of back, perineum, and buttocks with no rash/bruising/injury noted. No tenderness to paraspinal muscles of spinous processes. Patient has tried lidocaine patch and aqua K pad and Tylenol with minimal relief. Discussed ambulating, lying/sitting in alternate positions (e.g., her side), and alternating ice and heat will likely be helpful.     - Bengay ordered QID PRN  - Continue other measures including Tylenol, ice, heating, and lidocaine patch    Discussed w/ Dr. Pedro Pablo Love MD  OBGYN Resident  06/30/23  8:45 PM

## 2023-07-01 NOTE — UTILIZATION REVIEW
Notification of Maternity Inpatient Admission/Maternity Inpatient Authorization Request  This is a Notification of Maternity Inpatient Admission/Maternity Inpatient Authorization Request to our facility [address] [place] [address]. Please be advised that this patient is currently in our facility under Inpatient Status. Below you will find the Birth/ Summary, Attending Physician and Facility’s information including NPI#. and contact information for the Utilization Review Department where the patient is receiving care services. Facility: [address] [place] [address]  Address: [address]  Phone: [phone] Tax ID: [id number]  NPI: [id number]  Medicare ID: [id number]    Place of Service Code: 24   Place of Service Name: Inpatient Hospital  Presentation Date & Time: 2023 11:21 PM  Inpatient Admission Date & Time: 23 11:21 PM  Discharge Date & Time: No discharge date for patient encounter. Discharge Disposition (if discharged): Home/Self Care  Attending Physician & NPI: Lizy Vincent Md [[id number]]    Mother of  Information: Marty Summers   MRN: [id number] YOB: 1995   Mother's Admitting Diagnosis: 45 weeks gestation of pregnancy [Z3A.38]  Estimated Date of Delivery: 23  Type of Delivery: Vaginal, Spontaneous  ICD 10: O80  Delivering clinician: Lizy Vincent   OB History        1    Para   1    Term   1       0    AB   0    Living   1       SAB   0    IAB   0    Ectopic   0    Multiple   0    Live Births   1           Obstetric Comments   Menarche 13  Irregular, on OCP           [place] Name & MRN:   Information for the patient's :  Blu Cottrell [[id number]]     [place] Delivery Information:  Sex: male  Delivered 2023 6:53 AM by Vaginal, Spontaneous; Gestational Age: 36w0d     Measurements:  Weight: 8 lb 10.3 oz (3920 g);   Height: 20.08"    APGAR 1 minute 5 minutes 10 minutes   Totals: 8 8      Thank you,  Zan Mary Utilization Review Department  Phone: Radha Stroud. - [phone]; Fax [phone]  ATTENTION: Please call with any questions or concerns to [phone]  and carefully follow the prompts so that you are directed to the right person. Send all requests for admission clinical reviews, approved or denied determinations and any other requests to fax [phone]. All voicemails are confidential.  Initial Clinical Review    Admission: Date/Time/Statement:   Admission Orders (From admission, onward)     Ordered        23 2324  Inpatient Admission  Once                      Orders Placed This Encounter   Procedures   • Inpatient Admission     Standing Status:   Standing     Number of Occurrences:   1     Order Specific Question:   Level of Care     Answer:   Med Surg [16]     Order Specific Question:   Estimated length of stay     Answer:   More than 2 Midnights     Order Specific Question:   Certification     Answer:   I certify that inpatient services are medically necessary for this patient for a duration of greater than two midnights. See H&P and MD Progress Notes for additional information about the patient's course of treatment. Chief Complaint   Patient presents with   • Scheduled Induction       Initial Presentation: 25 y.o. female , presented to [facility], Direct Admission, from home via walk in. Admitted as Inpatient due to IOL after PROM. Date: 2023      female with an JOSE A of 2023, by Last Menstrual Period at 38w5d weeks gestation who is being admitted for PROM. Patient felt gush of fluid this afternoon. Evaluated here at [facility] earlier today for r/o PROM. Initial Nitrazine and Ferning negative, NAVDEEP wnl, and low clinical suspicion for rupture. Discharged home with return precautions. Amnisure was pending at the time of discharge.  Amnisure report came back positive. Discussed with patient the high false positive rate and discussed with her the option of induction at this time or close follow up outpatient. Patient opted for admission with IOL. Denies nausea, vomiting, vaginal bleeding, vaginal discharge. Contractions: Present  Loss of fluid: Yes (PROM)  Vaginal bleeding: No  Fetal movement: Present    Day 2: 06/28/2023 06/28/2023 @ 0458  Contraction Frequency (minutes): 3  Contraction Quality: Mild  Tachysystole: No   Cervical Dilation: 2-3  Cervical Effacement: 20  Fetal Station: -3  Baseline Rate: 140 bpm  Fetal Heart Rate: 130 BPM  Pt is feeling increasing pain with contractions. SVE as above.  bpm with moderate variability, accelerations, no decelerations. She is requesting an epidural.  We will start Pitocin after epidural placement. 06/28/2023 @ 1045  Dose (bel-units/min) Oxytocin: 8 bel-units/min  Contraction Frequency (minutes): Indeterminate  Contraction Quality: Moderate  Tachysystole: No   Cervical Dilation: 3  Cervical Effacement: 90  Fetal Station: -2  Baseline Rate: 125 bpm  Fetal Heart Rate: 135 BPM  FHR Category: Category I  AROM forebag for clear, bloody fluid. Reposition. Continue to titrate pitocin.    06/28/2023 @ 1623  Dose (bel-units/min) Oxytocin: 6 bel-units/min  Contraction Frequency (minutes): 3  Contraction Quality: Moderate  Tachysystole: No   Cervical Dilation: 4  Cervical Effacement: 90  Fetal Station: -1  Baseline Rate: 130 bpm  Fetal Heart Rate: 135 BPM  FHR Category: Category I  Oxytocin Safety Progress Check: Safety check completed  FHR cat 1  IUPC was displaced into vagina. Replaced.    Urinary siddiqui was low: moved to position, off tension  Cervix progressed slightly in dilation and station  Continue oxytocin  Reassurance provided  If amnioinfusion does not leak out and variables are not present, ok to DC infusion and keep IUPC for monitoring only    Day 3: 06/29/2023 06/29/2023 @ 0012  Dose (bel-units/min) Oxytocin: 12 bel-units/min   Contraction Frequency (minutes): 2-4   Contraction Quality: Moderate   Tachysystole: No   Cervical Dilation: 7       Cervical Effacement: 90   Fetal Station: -1   Baseline Rate: 140 bpm   Fetal Heart Rate: 140 BPM   FHR Category: Category I   Oxytocin Safety Progress Check: Safety check completed  Patient feeling increasing pain in perineal region. SVE as above. Fetal heart tracing category 1 with 140 baseline with moderate variability, accelerations, no decelerations. IUPC replaced at this time. Anesthesia to bolus for pain control. We will continue titrating Pitocin as possible.     2023 @ 0522  Dose (bel-units/min) Oxytocin: 12 bel-units/min  Contraction Frequency (minutes): 1.5-2.5  Contraction Quality: Moderate  Tachysystole: No   Cervical Dilation: 10  Dilation Complete Date: 23  Dilation Complete Time: 0506  Cervical Effacement: 100  Fetal Station: 1  Baseline Rate: 155 bpm  Fetal Heart Rate: 140 BPM  FHR Category: Category I  Oxytocin Safety Progress Check: Safety check completed  Patient feeling increasing vaginal pressure. SVE as above. Fetal heart tracing category 1 with moderate variability, accelerations, no decelerations. We will start pushing at this time. Anticipate       2023 @ 0717  Procedure: Spontaneous Vaginal Delivery, repair of second degree laceration  Anesthesia: Epidural  Findings:   1. Viable male at 12, with APGARS of 8 and 8 at 1 and 5 minutes respectively,  2.  Spontaneous delivery of intact placenta   3. 2 degree laceration repaired with 3-0 and 2-0 Vicryl Rapide       Triage Vitals [23 0008]   Temperature Pulse Respirations Blood Pressure SpO2   99 °F (37.2 °C) (!) 116 18 129/86 --      Temp Source Heart Rate Source Patient Position - Orthostatic VS BP Location FiO2 (%)   Oral Monitor Lying Right arm --      Pain Score       6          Wt Readings from Last 1 Encounters:   23 111 kg (244 lb)     Additional Vital Signs:   Date/Time Temp Pulse Resp BP O2 Device Cardiac (WDL) Patient Position - Orthostatic VS   06/29/23 0929 99.5 °F (37.5 °C) 114 Abnormal  18 134/80 None (Room air) WDL --   06/29/23 0900 -- 118 Abnormal  18 129/73 -- -- --   06/29/23 0829 -- 115 Abnormal  18 128/80 -- -- --   06/29/23 0756 99.8 °F (37.7 °C) 129 Abnormal  19 128/73 -- -- --   06/29/23 0741 99.6 °F (37.6 °C) 127 Abnormal  19 124/77 -- -- --   06/29/23 0727 -- 133 Abnormal  19 127/75 -- -- --   06/29/23 0724 99.6 °F (37.6 °C) -- -- -- -- -- --   06/29/23 0711 -- 130 Abnormal   18 143/63 -- -- --   Pulse: MD Avina aware at 06/29/23 0711   06/29/23 0701 100.3 °F (37.9 °C) -- -- -- -- -- --   06/29/23 0656 -- 141 Abnormal  -- 131/58 -- -- --   06/29/23 0642 -- 129 Abnormal  -- 136/66 -- -- --   06/29/23 0628 -- 123 Abnormal  -- 125/68 -- -- --   06/29/23 0613 -- 117 Abnormal  -- 126/63 -- -- --   06/29/23 0556 -- 120 Abnormal  -- 119/65 -- -- --   06/29/23 0542 -- 117 Abnormal  -- 119/64 -- -- --   06/29/23 0527 -- 120 Abnormal  -- 119/57 -- -- --   06/29/23 0513 -- 131 Abnormal  -- 113/63 -- -- --   06/29/23 0457 -- 122 Abnormal  -- 130/73 -- -- --   06/29/23 0358 99.4 °F (37.4 °C) 117 Abnormal  -- 136/76 -- -- --   06/29/23 0341 -- 117 Abnormal  -- 125/74 -- -- --   06/29/23 0335 100.1 °F (37.8 °C) -- -- -- -- -- --   06/29/23 0327 -- 118 Abnormal  -- 125/74 -- -- --   06/29/23 0311 -- 116 Abnormal  -- 120/68 -- -- --   06/29/23 0256 -- 117 Abnormal  -- 121/69 -- -- --   06/29/23 0242 99.3 °F (37.4 °C) 121 Abnormal  -- 128/72 -- -- --   06/29/23 0227 -- 127 Abnormal  -- 115/73 -- -- --   06/29/23 0212 -- 120 Abnormal  -- 120/75 -- -- --   06/29/23 0157 -- 129 Abnormal  -- 116/72 -- -- --   06/29/23 0141 98.4 °F (36.9 °C) 129 Abnormal  -- 114/64 -- -- --   06/29/23 0128 -- 137 Abnormal  -- 131/73 -- -- --   06/29/23 0111 -- 125 Abnormal  -- 132/83 -- -- --   06/29/23 0057 -- 122 Abnormal  -- 123/76 -- -- --   06/29/23 0041 -- 126 Abnormal  -- 112/67 -- -- --   06/29/23 0027 98.6 °F (37 °C) 123 Abnormal  -- 121/74 -- -- --   06/29/23 0026 -- 123 Abnormal  -- 121/74 -- -- --   06/28/23 2356 -- 121 Abnormal  -- 130/78 -- -- --   06/28/23 2326 -- 117 Abnormal  -- 119/77 -- -- --   06/28/23 2311 98.5 °F (36.9 °C) 121 Abnormal  18 125/80 -- -- Lying   06/28/23 2256 -- 116 Abnormal  -- 116/74 -- -- --   06/28/23 2241 -- 114 Abnormal  -- 114/76 -- -- --   06/28/23 2226 -- 107 Abnormal  -- 125/76 -- -- --   06/28/23 2211 -- 111 Abnormal  -- 118/76 -- -- --   06/28/23 2156 -- 108 Abnormal  -- 116/72 -- -- --   06/28/23 2141 -- 105 -- 117/71 -- -- --   06/28/23 2126 98.2 °F (36.8 °C) 108 Abnormal  18 126/76 -- -- Lying   06/28/23 2111 -- 110 Abnormal  -- 120/74 -- -- --   06/28/23 2056 -- 108 Abnormal  -- 129/82 -- -- --   06/28/23 2041 -- 115 Abnormal  -- 124/84 -- -- --   06/28/23 2023 98.4 °F (36.9 °C) 115 Abnormal  198 Abnormal  123/77 -- -- Lying   06/28/23 2020 -- 114 Abnormal  -- 123/80 -- -- --   06/28/23 2017 -- 111 Abnormal  -- 124/83 -- -- --   06/28/23 2014 -- 114 Abnormal  -- 121/78 -- -- --   06/28/23 2011 -- 112 Abnormal  -- 118/77 -- -- --   06/28/23 2008 -- 113 Abnormal  -- 117/73 -- -- --   06/28/23 2005 -- 106 Abnormal  -- 118/73 -- -- --   06/28/23 2002 -- 111 Abnormal  -- 125/81 -- -- --   06/28/23 1959 -- 113 Abnormal  -- 121/77 -- -- --   06/28/23 1956 -- 113 Abnormal  -- 118/73 -- -- --   06/28/23 1951 -- 120 Abnormal  -- 121/66 -- -- --   06/28/23 1925 -- 114 Abnormal  -- 119/77 -- -- --   06/28/23 1911 98.6 °F (37 °C) 107 Abnormal  18 132/76 -- -- Lying   06/28/23 1854 -- 110 Abnormal  -- 114/72 -- -- --   06/28/23 1845 -- -- -- 118/77 -- -- --   06/28/23 1839 -- 111 Abnormal  -- -- -- -- --   06/28/23 1830 -- -- -- 122/82 -- -- --   06/28/23 1815 -- -- -- 122/78 -- -- --   06/28/23 1800 -- -- -- 122/79 -- -- --   06/28/23 1745 -- -- -- 121/77 -- -- --   06/28/23 1730 -- -- -- 120/71 -- -- --   06/28/23 1719 98.7 °F (37.1 °C) -- -- 126/74 -- -- --   06/28/23 1700 -- -- -- 119/60 -- -- --   06/28/23 1600 -- -- -- 131/82 -- -- --   06/28/23 1512 98.6 °F (37 °C) 93 -- 115/77 -- -- --   06/28/23 1454 -- 102 -- 113/63 -- -- --   06/28/23 1439 -- 111 Abnormal  -- 97/56 -- -- --   06/28/23 1424 -- 102 -- 106/58 -- -- --   06/28/23 1409 -- 107 Abnormal  -- 105/56 -- -- --   06/28/23 1354 -- 127 Abnormal  -- 117/60 -- -- --   06/28/23 1340 -- 118 Abnormal  -- 116/66 -- -- --   06/28/23 1324 -- 97 -- 120/58 -- -- --   06/28/23 1309 -- 102 -- 133/60 -- -- --   06/28/23 1300 97.4 °F (36.3 °C) Abnormal  -- -- -- -- -- --   06/28/23 1256 -- 100 -- 149/72 -- -- --   06/28/23 1240 -- 99 -- 129/77 -- -- --   06/28/23 1224 -- 105 -- 110/74 -- -- --   06/28/23 1209 -- 103 -- 111/71 -- -- --   06/28/23 1154 -- 91 -- 118/79 -- -- --   06/28/23 1139 -- 88 -- 114/76 -- -- --   06/28/23 1124 -- -- -- 109/70 -- -- --   06/28/23 1109 -- 109 Abnormal  -- 110/70 -- -- --   06/28/23 1055 -- 114 Abnormal  -- 115/72 -- -- --   06/28/23 1039 -- 101 -- 101/56 -- -- --   06/28/23 1030 98.6 °F (37 °C) -- -- -- -- -- --   06/28/23 1024 -- 100 -- 103/57 -- -- --   06/28/23 1009 -- 103 -- 99/53 -- -- --   06/28/23 0955 -- 102 -- 102/50 -- -- --   06/28/23 0940 -- 116 Abnormal  -- 107/58 -- -- --   06/28/23 0925 -- 114 Abnormal  -- 121/59 -- -- --   06/28/23 0924 -- 114 Abnormal  -- 121/59 -- -- --   06/28/23 0910 -- 112 Abnormal  -- 124/63 -- -- --   06/28/23 0900 -- -- -- 118/58 -- -- --   06/28/23 0854 -- 111 Abnormal  -- -- -- -- --   06/28/23 0840 -- 96 -- 120/61 -- -- --   06/28/23 0824 -- 102 -- 104/60 -- -- --   06/28/23 0809 -- 98 -- 101/59 -- -- --   06/28/23 0800 -- -- -- 100/59 -- -- --   06/28/23 0754 -- 100 -- -- -- -- --   06/28/23 0739 -- 97 -- 94/53 -- -- --   06/28/23 0724 -- 98 -- 106/63 -- -- --   06/28/23 0709 -- 114 Abnormal  -- 93/53 -- -- --   06/28/23 0700 -- -- -- 98/57 -- -- --   06/28/23 0641 -- 115 Abnormal  -- 115/66 -- -- -- 06/28/23 0630 99 °F (37.2 °C) -- -- -- -- -- --   06/28/23 0625 -- 108 Abnormal  -- 109/63 -- -- --   06/28/23 0608 -- 114 Abnormal  -- 107/56 -- -- --   06/28/23 0605 -- 118 Abnormal  -- 111/62 -- -- --   06/28/23 0602 -- 117 Abnormal  -- 101/61 -- -- --   06/28/23 0559 -- 118 Abnormal  -- 100/58 -- -- --   06/28/23 0556 -- 122 Abnormal  -- 97/56 -- -- --   06/28/23 0553 -- 126 Abnormal  -- 103/60 -- -- --   06/28/23 0550 -- 115 Abnormal  -- 108/65 -- -- --   06/28/23 0547 -- 113 Abnormal  -- 107/64 -- -- --   06/28/23 0544 -- 125 Abnormal  -- 114/72 -- -- --   06/28/23 0541 -- 112 Abnormal  -- 117/81 -- -- --   06/28/23 0540 -- 109 Abnormal  -- 119/84 -- -- --   06/28/23 0415 99.2 °F (37.3 °C) -- -- -- -- -- --   06/28/23 0230 98.9 °F (37.2 °C) -- -- -- -- -- --           Pertinent Labs/Diagnostic Test Results:   Results from last 7 days   Lab Units 07/01/23  0543 06/30/23 2214 06/30/23  0801 06/28/23  0318   WBC Thousand/uL 12.87* 15.94* 16.60* 10.87*   HEMOGLOBIN g/dL 8.1* 8.6* 8.2* 11.6   HEMATOCRIT % 26.1* 27.7* 26.1* 36.7   PLATELETS Thousands/uL 337 348 293 296     Results from last 7 days   Lab Units 06/28/23  0355   AMPH/METH  Negative   BARBITURATE UR  Negative   BENZODIAZEPINE UR  Negative   COCAINE UR  Negative   METHADONE URINE  Negative   OPIATE UR  Negative   PCP UR  Negative   THC UR  Negative     Past Medical History:   Diagnosis Date   • Cyclical vomiting syndrome    • Depression    • Functional dyspnea    • Migraine    • Varicella     had chicken pox as a child     Present on Admission:  • Depression affecting pregnancy in third trimester, antepartum  • Marijuana use  • Prolonged QT interval syndrome  • Anxiety      Admitting Diagnosis: 38 weeks gestation of pregnancy [Z3A.38]  Age/Sex: 25 y.o. female  Admission Orders:  Regular diet  Up as tolerated    Scheduled Medications:  docusate sodium, 100 mg, Oral, BID  ferrous sulfate, 325 mg, Oral, Daily With Breakfast  mirtazapine, 30 mg, Oral, HS  prenatal multivitamin, 1 tablet, Oral, Daily      Continuous IV Infusions:     PRN Meds:  acetaminophen, 650 mg, Oral, Q4H PRN  benzocaine-menthol-lanolin-aloe, 1 Application, Topical, X7B PRN  calcium carbonate, 1,000 mg, Oral, Daily PRN  diphenhydrAMINE, 25 mg, Oral, Q6H PRN  hydrocortisone, 1 Application, Topical, Daily PRN  ibuprofen, 600 mg, Oral, Q6H PRN  lidocaine, 1 patch, Topical, Daily PRN  menthol-methyl salicylate, , Apply externally, 4x Daily PRN  ondansetron, 4 mg, Intravenous, Q8H PRN  simethicone, 80 mg, Oral, 4x Daily PRN  trimethobenzamide, 200 mg, Intramuscular, Q6H PRN  witch hazel-glycerin, 1 Pad, Topical, Q4H PRN            Network Utilization Review Department  ATTENTION: Please call with any questions or concerns to [phone] and carefully listen to the prompts so that you are directed to the right person. All voicemails are confidential.  Gisele Manrique all requests for admission clinical reviews, approved or denied determinations and any other requests to dedicated fax number below belonging to the campus where the patient is receiving treatment.  List of dedicated fax numbers for the Facilities:  [place] DENIALS (Administrative/Medical Necessity) [phone] [address] [facility] (Maternity/NICU/Pediatrics) [phone]   [address] [place] [address] [phone]   [facility] [address] [facility] [phone]   [address] [place] [address] [id number] [place] [address] [phone]   [address] [facility] [address] [facility] [address] [facility] [phone]

## 2023-07-03 NOTE — UTILIZATION REVIEW
[address] [place] [address] 1    Discharge Summary - OB/GYN   Carline Rahman 25 y.o. female MRN: [id number]  Unit/Bed#: L&D 322-01 Encounter: [id number]        Admission Date: 2023      Discharge Date: 23     Predelivery Diagnosis:  1. Pregnancy at 39 weeks  2. PROM  3. Depression  4. Marijuana use  5. Anxiety  6. Prolonged QT syndrome      Postdelivery Diagnosis:  1. Same as above  2. Delivery of term      Procedures:  Spontaneous vaginal delivery  Second degree perineal laceration repair     Attending: Umer Fatima MD     Hospital Course:      Ms. Jennifer Fahringer is a 28 y.o.  at 39wk0d. She presented to labor and delivery for IOL for [employer] was given one dose of cytotec. She was then started on pitocin. She receive an epidural for pain control. She received an IUPC and amnioinfusion for variable decelerations. Epidural was replaced as it was dislodged. She continued to make cervical change to completely dilated and began pushing.     She delivered a viable male  on 23 at 0653. Weight 8lbs 10.3oz via spontaneous vaginal delivery. Apgars were 8 (1 min) and 8 (5 min).  was transferred to  nursery. Patient tolerated the procedure well and was transferred to recovery in stable condition.      Her post-partum course was complicated by low urine output which improved with oral hydration. Labs were collected and notable for BERNARD with peak creatinine 1.25 (baseline 0.68) which spontaneously improved with PO hydration. Her post-partum pain was well controlled with oral analgesics.     On day of discharge, she was ambulating and able to reasonably perform all ADLs. She was voiding and had appropriate bowel function. Pain was well controlled. She was discharged home on post-partum day #2 without complications.  Patient was instructed to follow up with her OB as an outpatient and was given appropriate warnings to call provider if she develops signs of infection or uncontrolled pain.     Complications: none apparent     Condition at discharge: good      Discharge instructions/Information to patient and family:   See after visit summary for information provided to patient and family.       Provisions for Follow-Up Care:  See after visit summary for information related to follow-up care and any pertinent home health orders.       Disposition: Home     Planned Readmission: No     Discharge Medications: For a complete list of the patient's medications, please refer to her med rec.  Richard Aquino MD  OBGYN Resident  23  7:27 AM                     Cosigned by: Luis Mariano MD at 2023  9:59 AM     Revision History                                  Discharge Summary - OB/GYN   Lorin Alvarado 25 y.o. female MRN: [id number]  Unit/Bed#: L&D 322-01 Encounter: [id number]        Admission Date: 2023      Discharge Date: 23     Predelivery Diagnosis:  1. Pregnancy at 39 weeks  2. PROM  3. Depression  4. Marijuana use  5. Anxiety  6. Prolonged QT syndrome      Postdelivery Diagnosis:  1. Same as above  2. Delivery of term      Procedures:  Spontaneous vaginal delivery  Second degree perineal laceration repair     Attending: Eloise Stauffer MD     Hospital Course:      Ms. Jennifer Fahringer is a 28 y.o.  at 39wk0d. She presented to labor and delivery for IOL for [employer] was given one dose of cytotec. She was then started on pitocin. She receive an epidural for pain control. She received an IUPC and amnioinfusion for variable decelerations. Epidural was replaced as it was dislodged. She continued to make cervical change to completely dilated and began pushing.     She delivered a viable male  on 23 at 0653. Weight 8lbs 10.3oz via spontaneous vaginal delivery. Apgars were 8 (1 min) and 8 (5 min).  was transferred to  nursery. Patient tolerated the procedure well and was transferred to recovery in stable condition.    Her post-partum course was complicated by low urine output which improved with oral hydration. Labs were collected and notable for BERNARD with peak creatinine 1.25 (baseline 0.68) which spontaneously improved with PO hydration. Her post-partum pain was well controlled with oral analgesics.     On day of discharge, she was ambulating and able to reasonably perform all ADLs. She was voiding and had appropriate bowel function. Pain was well controlled. She was discharged home on post-partum day #2 without complications. Patient was instructed to follow up with her OB as an outpatient and was given appropriate warnings to call provider if she develops signs of infection or uncontrolled pain.     Complications: none apparent     Condition at discharge: good      Discharge instructions/Information to patient and family:   See after visit summary for information provided to patient and family.       Provisions for Follow-Up Care:  See after visit summary for information related to follow-up care and any pertinent home health orders.       Disposition: Home     Planned Readmission: No     Discharge Medications:   For a complete list of the patient's medications, please refer to her med rec.  Torey Hutton MD  OBGYN Resident  07/01/23  7:27 AM                     Cosigned by: Lukasz Mathews MD at 7/1/2023  9:59 AM     Revision History

## 2023-07-03 NOTE — UTILIZATION REVIEW
NOTIFICATION OF ADMISSION DISCHARGE   This is a Notification of Discharge from [facility]. Please be advised that this patient has been discharge from our facility. Below you will find the admission and discharge date and time including the patient’s disposition. UTILIZATION REVIEW CONTACT:  Javad Riddle  Utilization   Network Utilization Review Department  Phone: [phone] x carefully listen to the prompts. All voicemails are confidential.  Email: [email or web address]     ADMISSION INFORMATION  PRESENTATION DATE: 6/27/2023 11:21 PM    INPATIENT ADMISSION DATE: 6/27/23 11:21 PM   DISCHARGE DATE: 7/1/2023  5:45 PM   DISPOSITION:Home/Self Care    IMPORTANT INFORMATION:  Send all requests for admission clinical reviews, approved or denied determinations and any other requests to dedicated fax number below belonging to the campus where the patient is receiving treatment.  List of dedicated fax numbers:  [address] [facility] [place] (Administrative/Medical Necessity) [phone] [facility] [phone]   [address] [facility] [place] [phone]   [facility] [phone] [address] [facility] [phone]   [address] [place] [address] [phone]   [address] [facility] [phone]   [address] [place] [address] [phone]   [address] [place] [address] [address] [facility] [address] [facility] [phone]           Amena Morales MD  Resident  OB/GYN  Discharge Summary     Attested  Date of Service:  7/1/2023  7:27 AM     Attested            Attestation signed by Meena Mckeon MD at 7/1/2023  9:59 AM     I agree with the discharge summary as dictated by Dr. Kathi Ornelas MD              Discharge Summary - OB/GYN   Eduardo Gant 25 y.o. female MRN: [id number]  Unit/Bed#: L&D 322-01 Encounter: [id number]        Admission Date: 2023      Discharge Date: 23     Predelivery Diagnosis:  1. Pregnancy at 39 weeks  2. PROM  3. Depression  4. Marijuana use  5. Anxiety  6. Prolonged QT syndrome      Postdelivery Diagnosis:  1. Same as above  2. Delivery of term      Procedures:  Spontaneous vaginal delivery  Second degree perineal laceration repair     Attending: Ilya Amezquita MD     Hospital Course:      Ms. Jennifer Fahringer is a 28 y.o.  at 39wk0d. She presented to labor and delivery for IOL for [employer] was given one dose of cytotec. She was then started on pitocin. She receive an epidural for pain control. She received an IUPC and amnioinfusion for variable decelerations. Epidural was replaced as it was dislodged. She continued to make cervical change to completely dilated and began pushing.     She delivered a viable male  on 23 at 0653. Weight 8lbs 10.3oz via spontaneous vaginal delivery. Apgars were 8 (1 min) and 8 (5 min).  was transferred to  nursery. Patient tolerated the procedure well and was transferred to recovery in stable condition.      Her post-partum course was complicated by low urine output which improved with oral hydration. Labs were collected and notable for BERNARD with peak creatinine 1.25 (baseline 0.68) which spontaneously improved with PO hydration. Her post-partum pain was well controlled with oral analgesics.     On day of discharge, she was ambulating and able to reasonably perform all ADLs. She was voiding and had appropriate bowel function. Pain was well controlled. She was discharged home on post-partum day #2 without complications.  Patient was instructed to follow up with her OB as an outpatient and was given appropriate warnings to call provider if she develops signs of infection or uncontrolled pain.     Complications: none apparent     Condition at discharge: good    Discharge instructions/Information to patient and family:   See after visit summary for information provided to patient and family.       Provisions for Follow-Up Care:  See after visit summary for information related to follow-up care and any pertinent home health orders.       Disposition: Home     Planned Readmission: No     Discharge Medications:   For a complete list of the patient's medications, please refer to her med rec.  Darius Mackenzie MD  OBGYN Resident  07/01/23  7:27 AM                     Cosigned by: Silverio Laura MD at 7/1/2023  9:59 AM     Revision History

## 2023-07-05 ENCOUNTER — HOSPITAL ENCOUNTER (EMERGENCY)
Facility: HOSPITAL | Age: 28
Discharge: HOME | End: 2023-07-06
Attending: EMERGENCY MEDICINE | Admitting: OBSTETRICS & GYNECOLOGY
Payer: COMMERCIAL

## 2023-07-05 ENCOUNTER — NURSE TRIAGE (OUTPATIENT)
Dept: OTHER | Facility: OTHER | Age: 28
End: 2023-07-05

## 2023-07-05 DIAGNOSIS — R07.9 CHEST PAIN: ICD-10-CM

## 2023-07-05 DIAGNOSIS — R03.0 ELEVATED BLOOD PRESSURE READING: ICD-10-CM

## 2023-07-05 DIAGNOSIS — M79.89 LEG SWELLING: Primary | ICD-10-CM

## 2023-07-06 ENCOUNTER — APPOINTMENT (EMERGENCY)
Dept: RADIOLOGY | Facility: HOSPITAL | Age: 28
End: 2023-07-06
Payer: COMMERCIAL

## 2023-07-06 VITALS
DIASTOLIC BLOOD PRESSURE: 73 MMHG | OXYGEN SATURATION: 100 % | TEMPERATURE: 98.4 F | HEART RATE: 95 BPM | SYSTOLIC BLOOD PRESSURE: 132 MMHG | RESPIRATION RATE: 20 BRPM

## 2023-07-06 LAB
ALBUMIN SERPL BCP-MCNC: 3.6 G/DL (ref 3.5–5)
ALP SERPL-CCNC: 105 U/L (ref 34–104)
ALT SERPL W P-5'-P-CCNC: 41 U/L (ref 7–52)
ANION GAP SERPL CALCULATED.3IONS-SCNC: 10 MMOL/L
APTT PPP: 18 SECONDS (ref 23–37)
AST SERPL W P-5'-P-CCNC: 27 U/L (ref 13–39)
BACTERIA UR QL AUTO: ABNORMAL /HPF
BASOPHILS # BLD AUTO: 0.03 THOUSANDS/ÂΜL (ref 0–0.1)
BASOPHILS NFR BLD AUTO: 0 % (ref 0–1)
BILIRUB SERPL-MCNC: 0.4 MG/DL (ref 0.2–1)
BILIRUB UR QL STRIP: NEGATIVE
BUN SERPL-MCNC: 13 MG/DL (ref 5–25)
CALCIUM SERPL-MCNC: 9.1 MG/DL (ref 8.4–10.2)
CHLORIDE SERPL-SCNC: 107 MMOL/L (ref 96–108)
CLARITY UR: CLEAR
CO2 SERPL-SCNC: 24 MMOL/L (ref 21–32)
COLOR UR: ABNORMAL
CREAT SERPL-MCNC: 0.94 MG/DL (ref 0.6–1.3)
CREAT UR-MCNC: 124 MG/DL
EOSINOPHIL # BLD AUTO: 0.11 THOUSAND/ÂΜL (ref 0–0.61)
EOSINOPHIL NFR BLD AUTO: 1 % (ref 0–6)
ERYTHROCYTE [DISTWIDTH] IN BLOOD BY AUTOMATED COUNT: 15.3 % (ref 11.6–15.1)
FLUAV RNA RESP QL NAA+PROBE: NEGATIVE
FLUBV RNA RESP QL NAA+PROBE: NEGATIVE
GFR SERPL CREATININE-BSD FRML MDRD: 81 ML/MIN/1.73SQ M
GLUCOSE SERPL-MCNC: 87 MG/DL (ref 65–140)
GLUCOSE UR STRIP-MCNC: NEGATIVE MG/DL
HCT VFR BLD AUTO: 29 % (ref 34.8–46.1)
HGB BLD-MCNC: 8.7 G/DL (ref 11.5–15.4)
HGB UR QL STRIP.AUTO: ABNORMAL
IMM GRANULOCYTES # BLD AUTO: 0.17 THOUSAND/UL (ref 0–0.2)
IMM GRANULOCYTES NFR BLD AUTO: 2 % (ref 0–2)
INR PPP: 0.89 (ref 0.84–1.19)
KETONES UR STRIP-MCNC: NEGATIVE MG/DL
LEUKOCYTE ESTERASE UR QL STRIP: ABNORMAL
LYMPHOCYTES # BLD AUTO: 1.35 THOUSANDS/ÂΜL (ref 0.6–4.47)
LYMPHOCYTES NFR BLD AUTO: 15 % (ref 14–44)
MAGNESIUM SERPL-MCNC: 1.8 MG/DL (ref 1.9–2.7)
MCH RBC QN AUTO: 25.4 PG (ref 26.8–34.3)
MCHC RBC AUTO-ENTMCNC: 30 G/DL (ref 31.4–37.4)
MCV RBC AUTO: 85 FL (ref 82–98)
MONOCYTES # BLD AUTO: 0.46 THOUSAND/ÂΜL (ref 0.17–1.22)
MONOCYTES NFR BLD AUTO: 5 % (ref 4–12)
MUCOUS THREADS UR QL AUTO: ABNORMAL
NEUTROPHILS # BLD AUTO: 6.81 THOUSANDS/ÂΜL (ref 1.85–7.62)
NEUTS SEG NFR BLD AUTO: 77 % (ref 43–75)
NITRITE UR QL STRIP: NEGATIVE
NON-SQ EPI CELLS URNS QL MICRO: ABNORMAL /HPF
NRBC BLD AUTO-RTO: 0 /100 WBCS
PH UR STRIP.AUTO: 7 [PH] (ref 4.5–8)
PLATELET # BLD AUTO: 537 THOUSANDS/UL (ref 149–390)
PMV BLD AUTO: 8.8 FL (ref 8.9–12.7)
POTASSIUM SERPL-SCNC: 4.2 MMOL/L (ref 3.5–5.3)
PROT SERPL-MCNC: 6.6 G/DL (ref 6.4–8.4)
PROT UR STRIP-MCNC: ABNORMAL MG/DL
PROT UR-MCNC: 63 MG/DL
PROT/CREAT UR: 0.51 MG/G{CREAT} (ref 0–0.1)
PROTHROMBIN TIME: 12.1 SECONDS (ref 11.6–14.5)
RBC # BLD AUTO: 3.43 MILLION/UL (ref 3.81–5.12)
RBC #/AREA URNS AUTO: ABNORMAL /HPF
RSV RNA RESP QL NAA+PROBE: NEGATIVE
SARS-COV-2 RNA RESP QL NAA+PROBE: NEGATIVE
SODIUM SERPL-SCNC: 141 MMOL/L (ref 135–147)
SP GR UR STRIP.AUTO: 1.02 (ref 1–1.03)
UROBILINOGEN UR QL STRIP.AUTO: 1 E.U./DL
WBC # BLD AUTO: 8.93 THOUSAND/UL (ref 4.31–10.16)
WBC #/AREA URNS AUTO: ABNORMAL /HPF

## 2023-07-06 PROCEDURE — 82570 ASSAY OF URINE CREATININE: CPT

## 2023-07-06 PROCEDURE — 83735 ASSAY OF MAGNESIUM: CPT

## 2023-07-06 PROCEDURE — 36415 COLL VENOUS BLD VENIPUNCTURE: CPT

## 2023-07-06 PROCEDURE — 99214 OFFICE O/P EST MOD 30 MIN: CPT

## 2023-07-06 PROCEDURE — 80053 COMPREHEN METABOLIC PANEL: CPT

## 2023-07-06 PROCEDURE — 0241U HB NFCT DS VIR RESP RNA 4 TRGT: CPT

## 2023-07-06 PROCEDURE — 85025 COMPLETE CBC W/AUTO DIFF WBC: CPT

## 2023-07-06 PROCEDURE — 87086 URINE CULTURE/COLONY COUNT: CPT

## 2023-07-06 PROCEDURE — 85730 THROMBOPLASTIN TIME PARTIAL: CPT

## 2023-07-06 PROCEDURE — 85610 PROTHROMBIN TIME: CPT

## 2023-07-06 PROCEDURE — 71045 X-RAY EXAM CHEST 1 VIEW: CPT

## 2023-07-06 PROCEDURE — 81001 URINALYSIS AUTO W/SCOPE: CPT

## 2023-07-06 PROCEDURE — 84156 ASSAY OF PROTEIN URINE: CPT

## 2023-07-06 NOTE — ED PROVIDER NOTES
History  Chief Complaint   Patient presents with   • Postpartum Complications     Delivery 6 days ago, pt reports fever, b/l leg swelling and fatigue     The patient is a 78-year-old female, 6 days postpartum status post vaginal delivery on 6/29 at 39w0d following PROM who presents to the ED for evaluation of bilateral leg swelling, generalized fatigue, and vaginal bleeding. She reports that she has had vaginal bleeding consistent with "heavy.",  Approximately 2 pads an hour, since delivery. She reports that 8 PM, she passed a large clot. She denies associated abdominal pain. She also notes bilateral leg swelling, which has been present since delivery but she feels as though this is worsened since discharge. She did have a headache yesterday, but does not currently. She reports tactile fever as well as chest pain, but is unsure if her chest pain is due to breast-feeding. She states that is constant in the center of her chest.  She denies associated dyspnea. She otherwise denies nausea, vomiting, history of DVT/PE, history of preeclampsia, vision changes, numbness, paresthesia. Prior to Admission Medications   Prescriptions Last Dose Informant Patient Reported? Taking?    Prenatal Vit-Iron Carbonyl-FA (prenatal multivitamin) TABS 7/6/2023 Self Yes Yes   Sig: Take 1 tablet by mouth daily   acetaminophen (TYLENOL) 325 mg tablet Past Week  No Yes   Sig: Take 2 tablets (650 mg total) by mouth every 6 (six) hours as needed for mild pain or moderate pain   docusate sodium (COLACE) 100 mg capsule Past Week  No Yes   Sig: Take 1 capsule (100 mg total) by mouth 2 (two) times a day as needed for constipation   ibuprofen (MOTRIN) 600 mg tablet 7/6/2023  No Yes   Sig: Take 1 tablet (600 mg total) by mouth every 6 (six) hours as needed for mild pain (cramping)   mirtazapine (REMERON) 30 mg tablet 7/6/2023 Self No Yes   Sig: TAKE 1 TABLET BY MOUTH EVERYDAY AT BEDTIME   norethindrone (MICRONOR) 0.35 MG tablet Unknown  No No   Sig: Take 1 tablet (0.35 mg total) by mouth daily      Facility-Administered Medications: None       Past Medical History:   Diagnosis Date   • Cyclical vomiting syndrome    • Depression    • Functional dyspnea    • Migraine    • Varicella     had chicken pox as a child       Past Surgical History:   Procedure Laterality Date   • CHOLECYSTECTOMY  07/2018   • TONSILLECTOMY         Family History   Problem Relation Age of Onset   • Drug abuse Father    • No Known Problems Brother    • Cancer Maternal Grandmother         lung   • Breast cancer Neg Hx    • Colon cancer Neg Hx    • Ovarian cancer Neg Hx    • Uterine cancer Neg Hx      I have reviewed and agree with the history as documented. E-Cigarette/Vaping   • E-Cigarette Use Never User      E-Cigarette/Vaping Substances   • Nicotine No    • THC No    • CBD No    • Flavoring No    • Other No    • Unknown No      Social History     Tobacco Use   • Smoking status: Former     Types: Cigarettes   • Smokeless tobacco: Never   Vaping Use   • Vaping Use: Never used   Substance Use Topics   • Alcohol use: Not Currently     Comment: rarely   • Drug use: Not Currently     Types: Marijuana     Comment: last used 2 weeks before finding out patient was pregnant (used within last two years)       Review of Systems   Constitutional: Positive for fatigue and fever (tactile). Negative for chills. HENT: Negative for congestion and rhinorrhea. Eyes: Negative for photophobia and visual disturbance. Respiratory: Negative for cough and shortness of breath. Cardiovascular: Positive for chest pain and leg swelling (bilateral). Gastrointestinal: Negative for abdominal pain, constipation, diarrhea, nausea and vomiting. Genitourinary: Positive for vaginal bleeding. Negative for dysuria and flank pain. Musculoskeletal: Negative for arthralgias and myalgias. Skin: Negative for rash and wound. Neurological: Positive for headaches (resolved).  Negative for dizziness, weakness and numbness. Psychiatric/Behavioral: Negative for behavioral problems. Physical Exam  Physical Exam  Vitals and nursing note reviewed. Constitutional:       General: She is not in acute distress. Appearance: She is well-developed. She is not ill-appearing or toxic-appearing. HENT:      Head: Normocephalic and atraumatic. Mouth/Throat:      Mouth: Mucous membranes are moist.   Eyes:      Conjunctiva/sclera: Conjunctivae normal.   Cardiovascular:      Rate and Rhythm: Normal rate and regular rhythm. Heart sounds: No murmur heard. Pulmonary:      Effort: Pulmonary effort is normal. No respiratory distress. Breath sounds: Normal breath sounds. No stridor. No wheezing, rhonchi or rales. Abdominal:      Palpations: Abdomen is soft. Tenderness: There is no abdominal tenderness. There is no guarding or rebound. Musculoskeletal:         General: No swelling. Normal range of motion. Cervical back: Neck supple. No rigidity. Right lower leg: Edema (3+) present. Left lower leg: Edema (3+) present. Skin:     General: Skin is warm and dry. Capillary Refill: Capillary refill takes less than 2 seconds. Neurological:      Mental Status: She is alert and oriented to person, place, and time. GCS: GCS eye subscore is 4. GCS verbal subscore is 5. GCS motor subscore is 6. Cranial Nerves: No facial asymmetry. Motor: No weakness.    Psychiatric:         Mood and Affect: Mood normal.         Vital Signs  ED Triage Vitals   Temperature Pulse Respirations Blood Pressure SpO2   07/05/23 2228 07/05/23 2228 07/05/23 2228 07/05/23 2229 07/05/23 2229   99.9 °F (37.7 °C) (!) 117 17 154/99 99 %      Temp Source Heart Rate Source Patient Position - Orthostatic VS BP Location FiO2 (%)   07/05/23 2228 07/05/23 2228 07/05/23 2229 07/05/23 2229 --   Oral Monitor Sitting Right arm       Pain Score       07/05/23 2229       No Pain           Vitals: 07/06/23 0211 07/06/23 0227 07/06/23 0241 07/06/23 0300   BP: 137/77 138/87 137/79 132/73   Pulse: 90 97 95    Patient Position - Orthostatic VS:             Visual Acuity      ED Medications  Medications - No data to display    Diagnostic Studies  Results Reviewed     Procedure Component Value Units Date/Time    Protein / creatinine ratio, urine [[id number]]  (Abnormal) Collected: 07/06/23 0016    Lab Status: Final result Specimen: Urine, Clean Catch Updated: 07/06/23 0411     Creatinine, Ur 124.0 mg/dL      Protein Urine Random 63 mg/dL      Prot/Creat Ratio, Ur 0.51    CBC and differential [[id number]]  (Abnormal) Collected: 07/06/23 0051    Lab Status: Final result Specimen: Blood from Arm, Left Updated: 07/06/23 0059     WBC 8.93 Thousand/uL      RBC 3.43 Million/uL      Hemoglobin 8.7 g/dL      Hematocrit 29.0 %      MCV 85 fL      MCH 25.4 pg      MCHC 30.0 g/dL      RDW 15.3 %      MPV 8.8 fL      Platelets 479 Thousands/uL      nRBC 0 /100 WBCs      Neutrophils Relative 77 %      Immat GRANS % 2 %      Lymphocytes Relative 15 %      Monocytes Relative 5 %      Eosinophils Relative 1 %      Basophils Relative 0 %      Neutrophils Absolute 6.81 Thousands/µL      Immature Grans Absolute 0.17 Thousand/uL      Lymphocytes Absolute 1.35 Thousands/µL      Monocytes Absolute 0.46 Thousand/µL      Eosinophils Absolute 0.11 Thousand/µL      Basophils Absolute 0.03 Thousands/µL     Urine Microscopic [[id number]]  (Abnormal) Collected: 07/06/23 0018    Lab Status: Final result Specimen: Urine, Clean Catch Updated: 07/06/23 0053     RBC, UA Innumerable /hpf      WBC, UA Innumerable /hpf      Epithelial Cells Moderate /hpf      Bacteria, UA Innumerable /hpf      MUCUS THREADS Occasional    Urine culture [[id number]] Collected: 07/06/23 0018    Lab Status:  In process Specimen: Urine, Clean Catch Updated: 07/06/23 0053    Protime-INR [[id number]]  (Normal) Collected: 07/06/23 0012    Lab Status: Final result Specimen: Blood from Arm, Left Updated: 07/06/23 0041     Protime 12.1 seconds      INR 0.89    APTT [[id number]]  (Abnormal) Collected: 07/06/23 0012    Lab Status: Final result Specimen: Blood from Arm, Left Updated: 07/06/23 0041     PTT 18 seconds     Comprehensive metabolic panel [[id number]]  (Abnormal) Collected: 07/06/23 0012    Lab Status: Final result Specimen: Blood from Arm, Left Updated: 07/06/23 0037     Sodium 141 mmol/L      Potassium 4.2 mmol/L      Chloride 107 mmol/L      CO2 24 mmol/L      ANION GAP 10 mmol/L      BUN 13 mg/dL      Creatinine 0.94 mg/dL      Glucose 87 mg/dL      Calcium 9.1 mg/dL      AST 27 U/L      ALT 41 U/L      Alkaline Phosphatase 105 U/L      Total Protein 6.6 g/dL      Albumin 3.6 g/dL      Total Bilirubin 0.40 mg/dL      eGFR 81 ml/min/1.73sq m     Narrative:      [facility] guidelines for Chronic Kidney Disease (CKD):   •  Stage 1 with normal or high GFR (GFR > 90 mL/min/1.73 square meters)  •  Stage 2 Mild CKD (GFR = 60-89 mL/min/1.73 square meters)  •  Stage 3A Moderate CKD (GFR = 45-59 mL/min/1.73 square meters)  •  Stage 3B Moderate CKD (GFR = 30-44 mL/min/1.73 square meters)  •  Stage 4 Severe CKD (GFR = 15-29 mL/min/1.73 square meters)  •  Stage 5 End Stage CKD (GFR <15 mL/min/1.73 square meters)  Note: GFR calculation is accurate only with a steady state creatinine    Magnesium [[id number]]  (Abnormal) Collected: 07/06/23 0012    Lab Status: Final result Specimen: Blood from Arm, Left Updated: 07/06/23 0037     Magnesium 1.8 mg/dL     FLU/RSV/COVID - if FLU/RSV clinically relevant [[id number]] Collected: 07/06/23 0020    Lab Status:  In process Specimen: Nares from Nose Updated: 07/06/23 0026    Urine Macroscopic, POC [[id number]]  (Abnormal) Collected: 07/06/23 0018    Lab Status: Final result Specimen: Urine Updated: 07/06/23 0019     Color, UA Mary     Clarity, UA Clear     pH, UA 7.0     Leukocytes, UA Large     Nitrite, UA Negative     Protein,  (2+) mg/dl      Glucose, UA Negative mg/dl      Ketones, UA Negative mg/dl      Urobilinogen, UA 1.0 E.U./dl      Bilirubin, UA Negative     Occult Blood, UA Large     Specific Gravity, UA 1.025    Narrative:      [facility] RESULT                 XR chest 1 view portable    (Results Pending)              Procedures  Procedures         ED Course       SBIRT 22yo+    Flowsheet Row Most Recent Value   Initial Alcohol Screen: US AUDIT-C     1. How often do you have a drink containing alcohol? 0 Filed at: 07/06/2023 0027   2. How many drinks containing alcohol do you have on a typical day you are drinking? 0 Filed at: 07/06/2023 0027   3a. Male UNDER 65: How often do you have five or more drinks on one occasion? 0 Filed at: 07/06/2023 0027   3b. FEMALE Any Age, or MALE 65+: How often do you have 4 or more drinks on one occassion? 0 Filed at: 07/06/2023 0027   Audit-C Score 0 Filed at: 07/06/2023 4327   MELINDA: How many times in the past year have you. .. Used an illegal drug or used a prescription medication for non-medical reasons? Never Filed at: 07/06/2023 0027        Medical Decision Making  Differential diagnosis includes but is not limited to preeclampsia, HELLP syndrome, postpartum hemorrhage, postoperative headache, GERD, mastitis    Initial workup ordered including CBC, CMP, UA, Protein:Cr ratio,Mg, coags, CXR. Initial BP elevated at 445 systolic, however repeat while I am in room is 140/93. Vitals otherwise stable    0006 Tiger text sent to [facility] consult resident   [place] text sent to Labor/postpartum resident role. They request patient be sent up to [facility] for further evaluation. RN has obtained labs but no IV at this time, OB requesting we attempt to place IV prior to sending patient upstairs. 0100 Patient brought upstairs immediately after CXR was obtained. Workup thus far unremarkable; no LFT elevation.  Patient is stable at time of transfer to [facility]    Chest pain: acute illness or injury  Elevated blood pressure reading: acute illness or injury  Leg swelling: acute illness or injury  Amount and/or Complexity of Data Reviewed  External Data Reviewed: labs and notes. Labs: ordered. Decision-making details documented in ED Course. Radiology: ordered. Disposition  Final diagnoses:   Leg swelling   Chest pain   Elevated blood pressure reading     Time reflects when diagnosis was documented in both MDM as applicable and the Disposition within this note     Time User Action Codes Description Comment    7/6/2023 12:55 AM Edwige Pulido Add [M79.89] Leg swelling     7/6/2023 12:55 AM Edwige Swiftier Add [R07.9] Chest pain     7/6/2023 12:56 AM Edwige Swiftier Add [R03.0] Elevated blood pressure reading       ED Disposition     ED Disposition   Send to L&D After Provider Eval    Condition   --    Date/Time   u Jul 6, 2023 12:55 AM    Comment   --         Follow-up Information    None         Discharge Medication List as of 7/6/2023  3:53 AM      CONTINUE these medications which have NOT CHANGED    Details   acetaminophen (TYLENOL) 325 mg tablet Take 2 tablets (650 mg total) by mouth every 6 (six) hours as needed for mild pain or moderate pain, Starting Sat 7/1/2023, No Print      docusate sodium (COLACE) 100 mg capsule Take 1 capsule (100 mg total) by mouth 2 (two) times a day as needed for constipation, Starting Sat 7/1/2023, No Print      ibuprofen (MOTRIN) 600 mg tablet Take 1 tablet (600 mg total) by mouth every 6 (six) hours as needed for mild pain (cramping), Starting Sat 7/1/2023, Normal      mirtazapine (REMERON) 30 mg tablet TAKE 1 TABLET BY MOUTH EVERYDAY AT BEDTIME, Normal      Prenatal Vit-Iron Carbonyl-FA (prenatal multivitamin) TABS Take 1 tablet by mouth daily, Historical Med      norethindrone (MICRONOR) 0.35 MG tablet Take 1 tablet (0.35 mg total) by mouth daily, Starting Sat 7/1/2023, Normal             No discharge procedures on file.     PDMP Review       Value Time User    PDMP Reviewed  Yes 9/14/2022  3:13 PM Bee Lopez MD          ED Provider  Electronically Signed by           Silva Burks PA-C  07/06/23 0442

## 2023-07-06 NOTE — TELEPHONE ENCOUNTER
Regarding: GYN /questions and concerns  ----- Message from Rick Olguin sent at 7/5/2023  9:05 PM EDT -----  "I am calling because I just gave birth a few days ago  and I passed a very large blood clot, having chills woke up this morning drenched in sweat and swollen feet and ankles "

## 2023-07-06 NOTE — PROGRESS NOTES
L&D Triage Note - OB/GYN  Annemarie Seal Fahringer 25 y.o. female MRN: [id number]  Unit/Bed#: L&D 324-01 Encounter: [id number]      ASSESSMENT:    Kalen Foley is a 25 y.o. Fredrick Berger PPD6 from [facility], presenting with chills this morning after a clot, chills, chest pain, and significant leg swelling. Patient had single elevated BP in the ED followed by normal BP in triage, eliminating concern for Pre-Eclampsia. PLAN:    1) R/o PreE  -Patient with significant bilateral foot edema, likely owing to lack of movement of patient  -After initial BP of 154/99 in the ED, patient's BP 130s/70s-80s  -CBC, CMP wnl, PC pending  -Patient declines signs/symptoms of PreE  -Appropriate for discharge with short-term follow-up in the office  2)BERNARD  -Patient had BERNARD noted after delivery, with creatinine stable at 0.94  -Follow up on urine culture  3) Chest pain  -Vital signs wnl, O2 sat 99%  -Covid/flu/RSV swab pendign  -CXR read pending  -Cardiac and pulmonary exam benign  -Pt to call provider with any concerns and follow up at her next scheduled postpartum appointent   - Case discussed with Dr. Jordan Haas:    Kalen Foley 25 y.o. Fredrick Berger at 39w0d with an Estimated Date of Delivery: 23 presenting to triage after evaluation in the emergency department for chills this morning after a clot, chills, chest pain, and significant leg swelling. The patient states that she is most concerned about the leg swelling, which began postpartum and has only increased since being at home. Patient states that she has not walked around much since delivering. Since the clot this morning, she has not had any further bleeding of concern.     OBJECTIVE:    Vitals:    23 0300   BP: 132/73   Pulse:    Resp:    Temp:    SpO2:        ROS:  Constitutional: Negative  Respiratory: Negative  Cardiovascular: Chest pain this morning    Gastrointestinal: Negative    General Physical Exam:  General: in no apparent distress  Cardiovascular: No murmurs  Lungs: non-labored breathing  Abdomen: abdomen is soft without significant tenderness, masses, organomegaly or guarding  Lower extremeties: nontender       Lab Results   Component Value Date    WBC 8.93 07/06/2023    HGB 8.7 (L) 07/06/2023    HCT 29.0 (L) 07/06/2023     (H) 07/06/2023     Lab Results   Component Value Date     06/26/2015    K 4.2 07/06/2023     07/06/2023    CO2 24 07/06/2023    BUN 13 07/06/2023    CREATININE 0.94 07/06/2023    GLUCOSE 82 06/26/2015    AST 27 07/06/2023    ALT 41 07/06/2023     Farida Richter MD,  OBGYN PGY-2  7/6/2023 3:53 AM

## 2023-07-06 NOTE — TELEPHONE ENCOUNTER
Reason for Disposition  • SEVERE leg swelling (e.g., swelling extends above knee, entire leg is swollen)  • SEVERE headache  • Swelling of face, arm or hands  (Exception: slight puffiness of fingers)    Answer Assessment - Initial Assessment Questions  1. ONSET: "When did the swelling start?" (e.g., minutes, hours, days)      Since giving birth but getting worse instead of improving  2. LOCATION: "What part of the leg is swollen?"  "Are both legs swollen or just one leg?"      B/l legs below knee  3. SEVERITY: "How bad is the swelling?" (e.g., localized; mild, moderate, severe)   - Localized - small area of swelling localized to one leg   - MILD pedal edema - swelling limited to foot and ankle, pitting edema < 1/4 inch (6 mm) deep, rest and elevation eliminate most or all swelling   - MODERATE edema - swelling of lower leg to knee, pitting edema > 1/4 inch (6 mm) deep, rest and elevation only partially reduce swelling   - SEVERE edema - swelling extends above knee, facial or hand swelling present       Persistent swelling on hands and legs and pitting edema  4. REDNESS: "Does the swelling look red or infected?"      denies  5. PAIN: "Is the swelling painful to touch?" If Yes, ask: "How painful is it?"   (Scale 1-10; mild, moderate or severe)      Feels like there's bruising. Difficulty walking  6. FEVER: "Do you have a fever?" If Yes, ask: "What is it, how was it measured, and when did it start?"       denies  7. MEDICAL HISTORY: "Do you have a history of blood clots, heart failure, kidney disease, liver failure, or cancer?"      Pt had isabel inpatient and treated with fluids  8. OTHER SYMPTOMS: "Do you have any other symptoms?" (e.g., chest pain, difficulty breathing)      Had chest pain yesterday was unsure if it was related to breastfeeding. Occurs when she is breastfeeding    Answer Assessment - Initial Assessment Questions  1.  ONSET: "Describe your bleeding: is it getting worse, staying the same, improving, or stopping and starting?"      Compares to heavier period. Was standing and felt blood clot come out. Size smaller than ping pong ball  2. AMOUNT: "How much bleeding are you having today?"      - SPOTTING: spotting, or pinkish / brownish mucous discharge; does not fill panti-liner or pad     - MILD:  less than 1 pad / hour; less than patient's usual menstrual bleeding    - MODERATE: 1-2 pads / hour; 1 menstrual cup every 6 hours; small-medium blood clots (e.g., pea, grape, small coin)    - SEVERE: soaking 2 or more pads/hour for 2 or more hours; 1 menstrual cup every 2 hours; bleeding not contained by pads or continuous red blood from vagina; large blood clots (e.g., golf ball, large coin)       Changing pad every few hours  3. ABDOMINAL PAIN: "Do you have any pain?" "How bad is the pain?" "What does it keep you from doing?"      - MILD -  doesn't interfere with normal activities, abdomen soft and not tender to touch      - MODERATE - interferes with normal activities or awakens from sleep, tender to touch      - SEVERE - excruciating pain, doubled over, unable to do any normal activities        Denies just feels sore  4. HORMONES: "Are you taking any birth control medications?" (e.g., birth control pills, Depo-Provera)      denies  5. BLOOD THINNERS: "Do you take any blood thinners?" (e.g., coumadin, aspirin)      denies  6. OTHER SYMPTOMS: "Do you have any other symptoms?" (e.g., fever, chills, dizziness)      Sweaty, chills, swollen feet and ankles. Had headache this morning but went away with tylenol  7. DELIVERY DATE: "When was your delivery date?" "Vaginal delivery or ?"      2023  8.  BREASTFEEDING: "Are you breastfeeding?"      yes    Protocols used: POSTPARTUM - LEG SWELLING AND EDEMA-ADULT-AH, POSTPARTUM - VAGINAL BLEEDING AND LOCHIA-ADULT-AH

## 2023-07-08 LAB
BACTERIA UR CULT: ABNORMAL
BACTERIA UR CULT: ABNORMAL

## 2023-08-05 DIAGNOSIS — F41.9 ANXIETY: ICD-10-CM

## 2023-08-05 DIAGNOSIS — G47.00 INSOMNIA, UNSPECIFIED TYPE: ICD-10-CM

## 2023-08-07 RX ORDER — MIRTAZAPINE 30 MG/1
TABLET, FILM COATED ORAL
Qty: 30 TABLET | Refills: 0 | Status: SHIPPED | OUTPATIENT
Start: 2023-08-07

## 2023-08-11 ENCOUNTER — POSTPARTUM VISIT (OUTPATIENT)
Dept: OBGYN CLINIC | Facility: CLINIC | Age: 28
End: 2023-08-11

## 2023-08-11 VITALS
OXYGEN SATURATION: 99 % | SYSTOLIC BLOOD PRESSURE: 118 MMHG | DIASTOLIC BLOOD PRESSURE: 76 MMHG | HEIGHT: 65 IN | BODY MASS INDEX: 35.59 KG/M2 | WEIGHT: 213.6 LBS

## 2023-08-11 DIAGNOSIS — Z30.011 ORAL CONTRACEPTIVE PRESCRIBED: ICD-10-CM

## 2023-08-11 PROBLEM — F32.A DEPRESSION AFFECTING PREGNANCY IN THIRD TRIMESTER, ANTEPARTUM: Status: RESOLVED | Noted: 2023-01-24 | Resolved: 2023-08-11

## 2023-08-11 PROBLEM — Z3A.38 38 WEEKS GESTATION OF PREGNANCY: Status: RESOLVED | Noted: 2023-02-16 | Resolved: 2023-08-11

## 2023-08-11 PROBLEM — O99.343 DEPRESSION AFFECTING PREGNANCY IN THIRD TRIMESTER, ANTEPARTUM: Status: RESOLVED | Noted: 2023-01-24 | Resolved: 2023-08-11

## 2023-08-11 PROCEDURE — 99024 POSTOP FOLLOW-UP VISIT: CPT | Performed by: NURSE PRACTITIONER

## 2023-08-11 RX ORDER — ACETAMINOPHEN AND CODEINE PHOSPHATE 120; 12 MG/5ML; MG/5ML
1 SOLUTION ORAL DAILY
Qty: 84 TABLET | Refills: 1 | Status: SHIPPED | OUTPATIENT
Start: 2023-08-11

## 2023-08-11 NOTE — PROGRESS NOTES
Assessment/Plan:    1. Postpartum exam  Normal exam.  RV 3 mo for YV    2. Oral contraceptive prescribed  Refills sent    - norethindrone (Ortho Micronor) 0.35 MG tablet; Take 1 tablet (0.35 mg total) by mouth daily  Dispense: 84 tablet; Refill: 1      Subjective:      Patient ID: Alyssa Gordon is a 25 y.o. female. HPI  POSTPARTUM EXAM    24 yo   Post vaginal delivery w/ repair of 2nd degree laceration on 23; male, 8lbs 10oz. Breast feeding/pumping exclusively. Denies any problems with urination or bowel function. No longer bleeding. She has not been sexual active yet. Was prescribed a  progesterone only pill at time of delivery but has not started it yet. PPD score 8. Still taking her Remeron. Still has break through anxiety. She will follow up with Dr Belkys Guerrier regarding this. The following portions of the patient's history were reviewed and updated as appropriate: allergies, current medications, past family history, past medical history, past social history, past surgical history and problem list.    Review of Systems   Constitutional: Negative for chills and fever. Gastrointestinal: Negative. Genitourinary: Negative. Objective:    /76 (BP Location: Left arm, Patient Position: Sitting, Cuff Size: Standard)   Ht 5' 5" (1.651 m)   Wt 96.9 kg (213 lb 9.6 oz)   LMP 2022 (Exact Date)   SpO2 99%   Breastfeeding Yes   BMI 35.54 kg/m²      Physical Exam  Constitutional:       General: She is not in acute distress. Appearance: Normal appearance. She is well-developed. She is not ill-appearing or diaphoretic. Comments: bmi 35.5   HENT:      Head: Normocephalic and atraumatic. Eyes:      Pupils: Pupils are equal, round, and reactive to light. Pulmonary:      Effort: Pulmonary effort is normal.   Chest:   Breasts:     Right: Normal.      Left: Normal.   Abdominal:      General: Abdomen is flat. Palpations: Abdomen is soft.    Genitourinary: General: Normal vulva. Exam position: Lithotomy position. Labia:         Right: No rash, tenderness, lesion or injury. Left: No rash, tenderness, lesion or injury. Vagina: No signs of injury and foreign body. No vaginal discharge, erythema, tenderness or bleeding. Cervix: No cervical motion tenderness, discharge or friability. Uterus: Not enlarged and not tender. Adnexa:         Right: No mass or tenderness. Left: No mass or tenderness. Skin:     General: Skin is warm and dry. Neurological:      General: No focal deficit present. Mental Status: She is alert and oriented to person, place, and time. Psychiatric:         Mood and Affect: Mood normal.         Behavior: Behavior normal.         Thought Content:  Thought content normal.         Judgment: Judgment normal.

## 2023-09-02 DIAGNOSIS — F41.9 ANXIETY: ICD-10-CM

## 2023-09-02 DIAGNOSIS — G47.00 INSOMNIA, UNSPECIFIED TYPE: ICD-10-CM

## 2023-09-05 RX ORDER — MIRTAZAPINE 30 MG/1
TABLET, FILM COATED ORAL
Qty: 90 TABLET | Refills: 1 | OUTPATIENT
Start: 2023-09-05

## 2023-09-06 DIAGNOSIS — G47.00 INSOMNIA, UNSPECIFIED TYPE: ICD-10-CM

## 2023-09-06 DIAGNOSIS — F41.9 ANXIETY: ICD-10-CM

## 2023-09-06 RX ORDER — MIRTAZAPINE 30 MG/1
TABLET, FILM COATED ORAL
Qty: 30 TABLET | Refills: 0 | OUTPATIENT
Start: 2023-09-06

## 2023-09-22 DIAGNOSIS — G47.00 INSOMNIA, UNSPECIFIED TYPE: ICD-10-CM

## 2023-09-22 DIAGNOSIS — F41.9 ANXIETY: ICD-10-CM

## 2023-09-22 RX ORDER — MIRTAZAPINE 30 MG/1
TABLET, FILM COATED ORAL
Qty: 30 TABLET | Refills: 0 | Status: SHIPPED | OUTPATIENT
Start: 2023-09-22

## 2023-10-16 DIAGNOSIS — F41.9 ANXIETY: ICD-10-CM

## 2023-10-16 DIAGNOSIS — G47.00 INSOMNIA, UNSPECIFIED TYPE: ICD-10-CM

## 2023-10-16 RX ORDER — MIRTAZAPINE 30 MG/1
TABLET, FILM COATED ORAL
Qty: 90 TABLET | Refills: 1 | Status: SHIPPED | OUTPATIENT
Start: 2023-10-16

## 2023-10-26 ENCOUNTER — OFFICE VISIT (OUTPATIENT)
Dept: FAMILY MEDICINE CLINIC | Facility: CLINIC | Age: 28
End: 2023-10-26
Payer: COMMERCIAL

## 2023-10-26 VITALS
HEART RATE: 89 BPM | SYSTOLIC BLOOD PRESSURE: 118 MMHG | TEMPERATURE: 97.6 F | BODY MASS INDEX: 34.01 KG/M2 | DIASTOLIC BLOOD PRESSURE: 76 MMHG | WEIGHT: 204.38 LBS | OXYGEN SATURATION: 97 %

## 2023-10-26 DIAGNOSIS — R53.83 OTHER FATIGUE: ICD-10-CM

## 2023-10-26 DIAGNOSIS — F41.9 ANXIETY: Primary | ICD-10-CM

## 2023-10-26 DIAGNOSIS — G47.00 INSOMNIA, UNSPECIFIED TYPE: ICD-10-CM

## 2023-10-26 PROCEDURE — 99214 OFFICE O/P EST MOD 30 MIN: CPT | Performed by: NURSE PRACTITIONER

## 2023-10-26 RX ORDER — ALPRAZOLAM 0.25 MG/1
0.25 TABLET ORAL 2 TIMES DAILY PRN
Qty: 60 TABLET | Refills: 0 | Status: SHIPPED | OUTPATIENT
Start: 2023-10-26 | End: 2023-11-01

## 2023-10-26 RX ORDER — BUPROPION HYDROCHLORIDE 150 MG/1
150 TABLET ORAL EVERY MORNING
Qty: 30 TABLET | Refills: 1 | Status: SHIPPED | OUTPATIENT
Start: 2023-10-26

## 2023-10-26 NOTE — ASSESSMENT & PLAN NOTE
Well-controlled on current dosage of Remeron. Patient was advised to continue taking Remeron at bedtime as directed.

## 2023-10-26 NOTE — ASSESSMENT & PLAN NOTE
Patient will be started on Wellbutrin  mg daily to help with daily anxiety symptoms. Patient was advised he can take 2 to 4 weeks to see true effect from the medication so she was also prescribed Xanax 0.25 mg to be used as needed up to 2 times per day for panic attacks. I will have patient return to the office in 1 month to reassess her symptoms.

## 2023-10-26 NOTE — PROGRESS NOTES
Name: Mary Rodriguez      : 1995      MRN: [id number]  Encounter Provider: MARGOT Mohan  Encounter Date: 10/26/2023   Encounter department: [facility]    Assessment & Plan     1. Anxiety  Assessment & Plan:  Patient will be started on Wellbutrin  mg daily to help with daily anxiety symptoms. Patient was advised he can take 2 to 4 weeks to see true effect from the medication so she was also prescribed Xanax 0.25 mg to be used as needed up to 2 times per day for panic attacks. I will have patient return to the office in 1 month to reassess her symptoms. Orders:  -     buPROPion (WELLBUTRIN XL) 150 mg 24 hr tablet; Take 1 tablet (150 mg total) by mouth every morning  -     ALPRAZolam (XANAX) 0.25 mg tablet; Take 1 tablet (0.25 mg total) by mouth 2 (two) times a day as needed for anxiety    2. Other fatigue  Assessment & Plan:  Multiple labs were ordered to assess for causes of recurrent fatigue. Orders:  -     CBC and differential; Future  -     Iron Panel (Includes Ferritin, Iron Sat%, Iron, and TIBC); Future  -     Vitamin D 25 hydroxy; Future  -     TSH, 3rd generation; Future  -     T4, free; Future  -     Lyme Total AB W Reflex to IGM/IGG; Future    3. Insomnia, unspecified type  Assessment & Plan:  Well-controlled on current dosage of Remeron. Patient was advised to continue taking Remeron at bedtime as directed. BMI Counseling: Body mass index is 34.01 kg/m². The BMI is above normal. Nutrition recommendations include decreasing portion sizes, encouraging healthy choices of fruits and vegetables, moderation in carbohydrate intake and increasing intake of lean protein. Exercise recommendations include exercising 3-5 times per week and obtaining a gym membership. No pharmacotherapy was ordered. Rationale for BMI follow-up plan is due to patient being overweight or obese.          Subjective      Anxiety: The patient has a noted history of anxiety and she reports that she has been having increased anxiety since having her child in June 2023. The patient is currently managed on Remeron 30 mg at bedtime for insomnia. The medication was also linked to the diagnosis of anxiety by her PCP Dr. William Barron however, the patient reports that the medication has never improved her anxiety much. She reports the medication does help with her insomnia however. The patient was prescribed Zoloft during her pregnancy but she reports this also did not improve her anxiety. She was prescribed Xanax 0.25 mg in the past and she reports that when she did take this medication as needed it did control her panic attacks and anxiety. Patient is interested in trialing another daily medication for her anxiety as she reports she is having the anxiety daily as well as frequent panic attacks and palpitations. She reports the anxiety is mostly related to feeling as though something bad will happen to her baby as well as separation anxiety from her baby when she must work. Patient would also like accommodation paperwork filled out to allow her to work from home as she does experience increased anxiety in public settings and would prefer to continue working remotely. Fatigue: Patient reports that she has been having increased fatigue recently. She reports that as long as she takes her Remeron she does sleep well but still feels fatigued during the day. Review of Systems   Constitutional:  Positive for fatigue. Negative for chills and fever. HENT:  Negative for ear pain and sore throat. Eyes:  Negative for pain and visual disturbance. Respiratory:  Negative for cough, chest tightness, shortness of breath and wheezing. Cardiovascular:  Positive for palpitations (intermittent-due to anxiety). Negative for chest pain and leg swelling. Gastrointestinal:  Negative for abdominal pain, constipation, diarrhea, nausea and vomiting.    Endocrine: Negative for cold intolerance and heat intolerance. Genitourinary:  Negative for decreased urine volume, dysuria and hematuria. Musculoskeletal:  Negative for arthralgias, back pain and myalgias. Skin:  Negative for color change and rash. Allergic/Immunologic: Negative for environmental allergies. Neurological:  Negative for dizziness, seizures, syncope, weakness, numbness and headaches. Hematological:  Negative for adenopathy. Psychiatric/Behavioral:  Negative for confusion, dysphoric mood, self-injury, sleep disturbance and suicidal ideas. The patient is nervous/anxious. All other systems reviewed and are negative. Current Outpatient Medications on File Prior to Visit   Medication Sig   • mirtazapine (REMERON) 30 mg tablet TAKE 1 TABLET BY MOUTH EVERYDAY AT BEDTIME   • norethindrone (Ortho Micronor) 0.35 MG tablet Take 1 tablet (0.35 mg total) by mouth daily   • [DISCONTINUED] acetaminophen (TYLENOL) 325 mg tablet Take 2 tablets (650 mg total) by mouth every 6 (six) hours as needed for mild pain or moderate pain (Patient not taking: Reported on 10/26/2023)   • [DISCONTINUED] docusate sodium (COLACE) 100 mg capsule Take 1 capsule (100 mg total) by mouth 2 (two) times a day as needed for constipation (Patient not taking: Reported on 8/11/2023)   • [DISCONTINUED] ibuprofen (MOTRIN) 600 mg tablet Take 1 tablet (600 mg total) by mouth every 6 (six) hours as needed for mild pain (cramping) (Patient not taking: Reported on 10/26/2023)   • [DISCONTINUED] Prenatal Vit-Iron Carbonyl-FA (prenatal multivitamin) TABS Take 1 tablet by mouth daily (Patient not taking: Reported on 8/11/2023)       Objective     /76 (BP Location: Right arm, Patient Position: Sitting, Cuff Size: Large)   Pulse 89   Temp 97.6 °F (36.4 °C) (Temporal)   Wt 92.7 kg (204 lb 6 oz)   SpO2 97%   BMI 34.01 kg/m²     Physical Exam  Vitals and nursing note reviewed. Constitutional:       General: She is not in acute distress.      Appearance: Normal appearance. She is not ill-appearing. HENT:      Head: Normocephalic. Eyes:      Conjunctiva/sclera: Conjunctivae normal.   Cardiovascular:      Rate and Rhythm: Normal rate and regular rhythm. Pulses: Normal pulses. Carotid pulses are 2+ on the right side and 2+ on the left side. Radial pulses are 2+ on the right side and 2+ on the left side. Posterior tibial pulses are 2+ on the right side and 2+ on the left side. Heart sounds: Normal heart sounds. No murmur heard. Pulmonary:      Effort: Pulmonary effort is normal. No respiratory distress. Breath sounds: Normal breath sounds. No decreased breath sounds, wheezing, rhonchi or rales. Abdominal:      General: Abdomen is flat. Bowel sounds are normal. There is no distension. Palpations: Abdomen is soft. Tenderness: There is no abdominal tenderness. There is no guarding. Musculoskeletal:         General: Normal range of motion. Cervical back: Normal range of motion. Right lower leg: No edema. Left lower leg: No edema. Skin:     General: Skin is warm and dry. Capillary Refill: Capillary refill takes less than 2 seconds. Neurological:      General: No focal deficit present. Mental Status: She is alert and oriented to person, place, and time. Psychiatric:         Mood and Affect: Mood normal.         Behavior: Behavior normal.         Thought Content:  Thought content normal.         Judgment: Judgment normal.       MARGOT Caban

## 2023-10-31 ENCOUNTER — TELEPHONE (OUTPATIENT)
Dept: FAMILY MEDICINE CLINIC | Facility: CLINIC | Age: 28
End: 2023-10-31

## 2023-10-31 NOTE — TELEPHONE ENCOUNTER
Pt called to check that it is safe for her to be taking Wellbutrin XL and Xanax while nursing. States she doesn't believe DL would have prescribed them if not, but would like reassurance. Also refaxed work form for pt.

## 2023-11-01 NOTE — TELEPHONE ENCOUNTER
Wellbutrin can be continued however, patient should not take Xanax while breast-feeding. This is my fault as I assumed that the patient was not breast-feeding I should have confirmed this before prescribing the medication.

## 2023-11-09 ENCOUNTER — TELEPHONE (OUTPATIENT)
Dept: FAMILY MEDICINE CLINIC | Facility: CLINIC | Age: 28
End: 2023-11-09

## 2023-11-09 NOTE — TELEPHONE ENCOUNTER
Please make patient aware that her paperwork was completed and faxed. She can pick the paperwork up at the  at her convenience if she would like.

## 2023-11-22 DIAGNOSIS — F41.9 ANXIETY: ICD-10-CM

## 2023-11-22 RX ORDER — BUPROPION HYDROCHLORIDE 150 MG/1
150 TABLET ORAL EVERY MORNING
Qty: 90 TABLET | Refills: 1 | Status: SHIPPED | OUTPATIENT
Start: 2023-11-22

## 2024-01-22 DIAGNOSIS — Z30.011 ORAL CONTRACEPTIVE PRESCRIBED: ICD-10-CM

## 2024-01-22 DIAGNOSIS — G47.00 INSOMNIA, UNSPECIFIED TYPE: ICD-10-CM

## 2024-01-22 DIAGNOSIS — F41.9 ANXIETY: ICD-10-CM

## 2024-01-22 RX ORDER — ACETAMINOPHEN AND CODEINE PHOSPHATE 120; 12 MG/5ML; MG/5ML
1 SOLUTION ORAL DAILY
Qty: 84 TABLET | Refills: 0 | Status: SHIPPED | OUTPATIENT
Start: 2024-01-22

## 2024-01-22 NOTE — TELEPHONE ENCOUNTER
One 90 day supply refill sent.  Patient is nearly 3 months overdue for yearly.  She needs to schedule YV for further refills.

## 2024-01-23 RX ORDER — MIRTAZAPINE 30 MG/1
30 TABLET, FILM COATED ORAL
Qty: 90 TABLET | Refills: 0 | Status: SHIPPED | OUTPATIENT
Start: 2024-01-23

## 2024-04-14 DIAGNOSIS — Z30.011 ORAL CONTRACEPTIVE PRESCRIBED: ICD-10-CM

## 2024-04-15 RX ORDER — ACETAMINOPHEN AND CODEINE PHOSPHATE 120; 12 MG/5ML; MG/5ML
1 SOLUTION ORAL DAILY
Qty: 84 TABLET | Refills: 1 | Status: SHIPPED | OUTPATIENT
Start: 2024-04-15

## 2024-04-18 DIAGNOSIS — G47.00 INSOMNIA, UNSPECIFIED TYPE: ICD-10-CM

## 2024-04-18 DIAGNOSIS — F41.9 ANXIETY: ICD-10-CM

## 2024-04-19 RX ORDER — MIRTAZAPINE 30 MG/1
30 TABLET, FILM COATED ORAL
Qty: 90 TABLET | Refills: 1 | Status: SHIPPED | OUTPATIENT
Start: 2024-04-19

## 2024-07-24 DIAGNOSIS — F41.9 ANXIETY: ICD-10-CM

## 2024-07-24 RX ORDER — BUPROPION HYDROCHLORIDE 150 MG/1
150 TABLET ORAL EVERY MORNING
Qty: 30 TABLET | Refills: 0 | Status: SHIPPED | OUTPATIENT
Start: 2024-07-24

## 2024-07-24 NOTE — TELEPHONE ENCOUNTER
Called pt and advice has been refilled for her as needed, once doing so I had schedule pt for an Annual Physical appt for July 29 at 2:00 PM with ANDIE Betts.    Thank you.  Brooke.

## 2024-09-05 ENCOUNTER — OFFICE VISIT (OUTPATIENT)
Dept: FAMILY MEDICINE CLINIC | Facility: CLINIC | Age: 29
End: 2024-09-05
Payer: COMMERCIAL

## 2024-09-05 VITALS
OXYGEN SATURATION: 97 % | HEART RATE: 88 BPM | DIASTOLIC BLOOD PRESSURE: 76 MMHG | WEIGHT: 205.8 LBS | BODY MASS INDEX: 34.29 KG/M2 | SYSTOLIC BLOOD PRESSURE: 116 MMHG | HEIGHT: 65 IN

## 2024-09-05 DIAGNOSIS — R35.0 FREQUENT URINATION: ICD-10-CM

## 2024-09-05 DIAGNOSIS — N30.01 ACUTE CYSTITIS WITH HEMATURIA: ICD-10-CM

## 2024-09-05 DIAGNOSIS — F41.9 ANXIETY: Primary | ICD-10-CM

## 2024-09-05 LAB
SL AMB  POCT GLUCOSE, UA: NEGATIVE
SL AMB LEUKOCYTE ESTERASE,UA: NORMAL
SL AMB POCT BILIRUBIN,UA: NEGATIVE
SL AMB POCT BLOOD,UA: NORMAL
SL AMB POCT CLARITY,UA: CLEAR
SL AMB POCT COLOR,UA: YELLOW
SL AMB POCT KETONES,UA: NEGATIVE
SL AMB POCT NITRITE,UA: POSITIVE
SL AMB POCT PH,UA: 7.5
SL AMB POCT SPECIFIC GRAVITY,UA: 1.02
SL AMB POCT URINE PROTEIN: NEGATIVE
SL AMB POCT UROBILINOGEN: 0.2

## 2024-09-05 PROCEDURE — 3725F SCREEN DEPRESSION PERFORMED: CPT | Performed by: NURSE PRACTITIONER

## 2024-09-05 PROCEDURE — 87086 URINE CULTURE/COLONY COUNT: CPT | Performed by: NURSE PRACTITIONER

## 2024-09-05 PROCEDURE — 87186 SC STD MICRODIL/AGAR DIL: CPT | Performed by: NURSE PRACTITIONER

## 2024-09-05 PROCEDURE — 81002 URINALYSIS NONAUTO W/O SCOPE: CPT | Performed by: NURSE PRACTITIONER

## 2024-09-05 PROCEDURE — 87077 CULTURE AEROBIC IDENTIFY: CPT | Performed by: NURSE PRACTITIONER

## 2024-09-05 PROCEDURE — 99214 OFFICE O/P EST MOD 30 MIN: CPT | Performed by: NURSE PRACTITIONER

## 2024-09-05 RX ORDER — BUSPIRONE HYDROCHLORIDE 5 MG/1
5 TABLET ORAL 2 TIMES DAILY
Qty: 60 TABLET | Refills: 1 | Status: SHIPPED | OUTPATIENT
Start: 2024-09-05

## 2024-09-05 RX ORDER — SERTRALINE HYDROCHLORIDE 25 MG/1
25 TABLET, FILM COATED ORAL DAILY
Qty: 30 TABLET | Refills: 5 | Status: CANCELLED | OUTPATIENT
Start: 2024-09-05 | End: 2025-03-04

## 2024-09-05 RX ORDER — CEPHALEXIN 500 MG/1
500 CAPSULE ORAL EVERY 12 HOURS SCHEDULED
Qty: 14 CAPSULE | Refills: 0 | Status: CANCELLED | OUTPATIENT
Start: 2024-09-05 | End: 2024-09-12

## 2024-09-05 RX ORDER — NITROFURANTOIN 25; 75 MG/1; MG/1
100 CAPSULE ORAL 2 TIMES DAILY
Qty: 10 CAPSULE | Refills: 0 | Status: SHIPPED | OUTPATIENT
Start: 2024-09-05 | End: 2024-09-10

## 2024-09-05 RX ORDER — ALPRAZOLAM 0.25 MG
0.25 TABLET ORAL 2 TIMES DAILY PRN
Qty: 60 TABLET | Refills: 0 | Status: SHIPPED | OUTPATIENT
Start: 2024-09-05

## 2024-09-05 NOTE — ASSESSMENT & PLAN NOTE
5-day course of Macrobid was ordered for treatment of acute UTI.  Repeat UA was ordered to be completed after the patient finishes her antibiotic course to assess for resolution of the infection.

## 2024-09-05 NOTE — ASSESSMENT & PLAN NOTE
Patient will be trialed on BuSpar 5 mg twice daily to better control her anxiety.  Xanax 0.25 mg was also reordered to be used twice daily as needed for anxiety and panic attacks.  I will reassess patient's symptoms in 1 month.

## 2024-09-05 NOTE — PROGRESS NOTES
Ambulatory Visit  Name: Jennifer Marie Fahringer      : 1995      MRN: [id number]  Encounter Provider: MARGOT Mario  Encounter Date: 2024   Encounter department: Replaced by [facility]    Assessment & Plan   1. Anxiety  Assessment & Plan:  Patient will be trialed on BuSpar 5 mg twice daily to better control her anxiety.  Xanax 0.25 mg was also reordered to be used twice daily as needed for anxiety and panic attacks.  I will reassess patient's symptoms in 1 month.  Orders:  -     busPIRone (BUSPAR) 5 mg tablet; Take 1 tablet (5 mg total) by mouth 2 (two) times a day  -     ALPRAZolam (XANAX) 0.25 mg tablet; Take 1 tablet (0.25 mg total) by mouth 2 (two) times a day as needed for anxiety  2. Frequent urination  -     POCT urine dip  -     Urine culture; Future  -     Urine culture  3. Acute cystitis with hematuria  Assessment & Plan:  5-day course of Macrobid was ordered for treatment of acute UTI.  Repeat UA was ordered to be completed after the patient finishes her antibiotic course to assess for resolution of the infection.  Orders:  -     nitrofurantoin (MACROBID) 100 mg capsule; Take 1 capsule (100 mg total) by mouth 2 (two) times a day for 5 days  -     UA w Reflex to Microscopic w Reflex to Culture; Future      Depression Screening and Follow-up Plan: Patient was screened for depression during today's encounter. They screened negative with a PHQ-2 score of 2.      History of Present Illness     Anxiety: Patient is currently managed on Wellbutrin 150 mg daily.  She reports that she stopped taking the medication about a week ago as she did not feel that it was improving her anxiety and that she felt more irritable while taking the medication.  Since discontinuing the medication she reports that her symptoms of irritability have improved but she remains very anxious.  The patient is interested in trialing another daily medication for anxiety.  She would also like to have alprazolam  "0.25 mg be reordered to be used as needed as this was discontinued previously when the patient was breast-feeding her child.  She is no longer breast-feeding at this point.    UTI: Patient reports that over the past few weeks she has been noting increased urinary urgency and frequency.  Urine dip performed in the office today was positive for nitrites and showed a small amount of leukocyte esterase.  The patient also had a mild amount of hematuria however, she is currently having her menstrual cycle.        Review of Systems   Constitutional:  Negative for chills and fever.   HENT:  Negative for ear pain and sore throat.    Eyes:  Negative for pain and visual disturbance.   Respiratory:  Negative for cough, chest tightness, shortness of breath and wheezing.    Cardiovascular:  Negative for chest pain, palpitations and leg swelling.   Gastrointestinal:  Negative for abdominal pain, constipation, diarrhea, nausea and vomiting.   Endocrine: Negative for cold intolerance and heat intolerance.   Genitourinary:  Positive for frequency and urgency. Negative for decreased urine volume, dysuria and hematuria.   Musculoskeletal:  Negative for arthralgias, back pain and myalgias.   Skin:  Negative for color change and rash.   Allergic/Immunologic: Negative for environmental allergies.   Neurological:  Negative for dizziness, seizures, syncope, weakness, light-headedness, numbness and headaches.   Hematological:  Negative for adenopathy.   Psychiatric/Behavioral:  Negative for confusion, dysphoric mood, self-injury, sleep disturbance and suicidal ideas. The patient is nervous/anxious.    All other systems reviewed and are negative.      Objective     /76 (BP Location: Right arm, Patient Position: Sitting, Cuff Size: Standard)   Pulse 88   Ht 5' 5\" (1.651 m)   Wt 93.4 kg (205 lb 12.8 oz)   LMP 09/05/2024   SpO2 97%   Breastfeeding No   BMI 34.25 kg/m²     Physical Exam  Vitals and nursing note reviewed. "   Constitutional:       General: She is not in acute distress.     Appearance: Normal appearance. She is well-developed. She is not ill-appearing.   HENT:      Head: Normocephalic.   Eyes:      Conjunctiva/sclera: Conjunctivae normal.   Cardiovascular:      Rate and Rhythm: Normal rate and regular rhythm.      Pulses: Normal pulses.           Carotid pulses are 2+ on the right side and 2+ on the left side.       Radial pulses are 2+ on the right side and 2+ on the left side.        Posterior tibial pulses are 2+ on the right side and 2+ on the left side.      Heart sounds: Normal heart sounds. No murmur heard.  Pulmonary:      Effort: Pulmonary effort is normal. No respiratory distress.      Breath sounds: Normal breath sounds. No decreased breath sounds, wheezing, rhonchi or rales.   Abdominal:      General: Abdomen is flat. Bowel sounds are normal. There is no distension.      Palpations: Abdomen is soft.      Tenderness: There is no abdominal tenderness. There is no right CVA tenderness, left CVA tenderness or guarding.   Musculoskeletal:         General: No swelling. Normal range of motion.      Cervical back: Normal range of motion and neck supple.      Right lower leg: No edema.      Left lower leg: No edema.   Skin:     General: Skin is warm and dry.      Capillary Refill: Capillary refill takes less than 2 seconds.   Neurological:      General: No focal deficit present.      Mental Status: She is alert and oriented to person, place, and time.   Psychiatric:         Mood and Affect: Mood normal.         Behavior: Behavior normal.         Thought Content: Thought content normal.         Judgment: Judgment normal.       Administrative Statements

## 2024-09-07 LAB
BACTERIA UR CULT: ABNORMAL
BACTERIA UR CULT: ABNORMAL

## 2024-09-28 DIAGNOSIS — F41.9 ANXIETY: ICD-10-CM

## 2024-09-29 RX ORDER — BUSPIRONE HYDROCHLORIDE 5 MG/1
5 TABLET ORAL 2 TIMES DAILY
Qty: 180 TABLET | Refills: 1 | Status: SHIPPED | OUTPATIENT
Start: 2024-09-29 | End: 2024-10-07

## 2024-10-05 PROBLEM — N30.01 ACUTE CYSTITIS WITH HEMATURIA: Status: RESOLVED | Noted: 2024-09-05 | Resolved: 2024-10-05

## 2024-10-07 ENCOUNTER — TELEMEDICINE (OUTPATIENT)
Dept: FAMILY MEDICINE CLINIC | Facility: CLINIC | Age: 29
End: 2024-10-07
Payer: COMMERCIAL

## 2024-10-07 VITALS — WEIGHT: 201 LBS | BODY MASS INDEX: 33.49 KG/M2 | HEIGHT: 65 IN

## 2024-10-07 DIAGNOSIS — F41.9 ANXIETY: Primary | ICD-10-CM

## 2024-10-07 PROCEDURE — 99213 OFFICE O/P EST LOW 20 MIN: CPT | Performed by: NURSE PRACTITIONER

## 2024-10-07 NOTE — PROGRESS NOTES
Virtual Regular Visit  Name: Jennifer Marie Fahringer      : 1995      MRN: [id number]  Encounter Provider: MARGOT Mario  Encounter Date: 10/7/2024   Encounter department: [facility]    Verification of patient location:    Patient is located at Home in the following state in which I hold an active license [place]    Assessment & Plan  Anxiety  Patient will be maintained on Xanax to be used twice daily as needed.  I will follow-up with the patient in 3 months to reassess her symptoms.              Encounter provider MARGOT Mario    The patient was identified by name and date of birth. Jennifer Marie Fahringer was informed that this is a telemedicine visit and that the visit is being conducted through the Epic Embedded platform. She agrees to proceed..  My office door was closed. No one else was in the room.  She acknowledged consent and understanding of privacy and security of the video platform. The patient has agreed to participate and understands they can discontinue the visit at any time.    Patient is aware this is a billable service.     History of Present Illness     Anxiety: At patient's last office visit she was started on BuSpar 5 mg twice daily and Xanax 0.25 mg twice daily as needed for increased anxiety symptoms.  The patient reports that she discontinued BuSpar due to dizziness associated with the medication however, she feels that Xanax has been adequately controlling her anxiety symptoms.  She reports that she is currently taking Xanax daily prior to work but she does not often have to take the medication in the evening.  She is not currently taking the medication every day.  She is interested in continuing Xanax at the current dosage but is not interested in trialing another daily anxiety medication at this time.              Review of Systems   Constitutional:  Negative for appetite change, chills and fever.   HENT:  Negative for ear pain and sore  "throat.    Eyes:  Negative for pain and visual disturbance.   Respiratory:  Negative for cough, chest tightness and shortness of breath.    Cardiovascular:  Negative for chest pain, palpitations and leg swelling.   Gastrointestinal:  Negative for abdominal pain, constipation, diarrhea, nausea and vomiting.   Endocrine: Negative for cold intolerance and heat intolerance.   Genitourinary:  Negative for decreased urine volume, difficulty urinating, dysuria and hematuria.   Musculoskeletal:  Negative for arthralgias, back pain and myalgias.   Skin:  Negative for color change, rash and wound.   Allergic/Immunologic: Negative for environmental allergies.   Neurological:  Negative for dizziness, seizures, syncope, light-headedness and headaches.   Hematological:  Negative for adenopathy.   Psychiatric/Behavioral:  Negative for confusion, dysphoric mood and sleep disturbance. The patient is nervous/anxious (improved).    All other systems reviewed and are negative.          Objective     Ht 5' 5\" (1.651 m)   Wt 91.2 kg (201 lb)   BMI 33.45 kg/m²   Physical Exam  Vitals reviewed: limited due to video visit.   Constitutional:       General: She is not in acute distress.     Appearance: Normal appearance. She is not ill-appearing.   Neurological:      Mental Status: She is alert.         Visit Time  Total Visit Duration: 15 minutes         "

## 2024-10-07 NOTE — ASSESSMENT & PLAN NOTE
Patient will be maintained on Xanax to be used twice daily as needed.  I will follow-up with the patient in 3 months to reassess her symptoms.

## 2024-10-18 DIAGNOSIS — Z30.011 ORAL CONTRACEPTIVE PRESCRIBED: ICD-10-CM

## 2024-10-18 RX ORDER — NORETHINDRONE 0.35 MG/1
1 TABLET ORAL DAILY
Qty: 84 TABLET | Refills: 0 | Status: SHIPPED | OUTPATIENT
Start: 2024-10-18

## 2024-10-20 DIAGNOSIS — G47.00 INSOMNIA, UNSPECIFIED TYPE: ICD-10-CM

## 2024-10-20 DIAGNOSIS — F41.9 ANXIETY: ICD-10-CM

## 2024-10-21 RX ORDER — MIRTAZAPINE 30 MG/1
30 TABLET, FILM COATED ORAL
Qty: 90 TABLET | Refills: 1 | Status: SHIPPED | OUTPATIENT
Start: 2024-10-21

## 2024-12-10 DIAGNOSIS — F41.9 ANXIETY: ICD-10-CM

## 2024-12-10 RX ORDER — ALPRAZOLAM 0.25 MG/1
0.25 TABLET ORAL 2 TIMES DAILY PRN
Qty: 60 TABLET | Refills: 0 | Status: SHIPPED | OUTPATIENT
Start: 2024-12-10

## 2025-01-21 DIAGNOSIS — F41.9 ANXIETY: ICD-10-CM

## 2025-01-21 PROBLEM — R50.9 ELEVATED TEMPERATURE: Status: RESOLVED | Noted: 2023-07-01 | Resolved: 2025-01-21

## 2025-01-21 PROBLEM — O26.893 PREGNANCY RELATED BACK PAIN, ANTEPARTUM, THIRD TRIMESTER: Status: RESOLVED | Noted: 2023-04-13 | Resolved: 2025-01-21

## 2025-01-21 PROBLEM — D72.829 LEUKOCYTOSIS: Status: RESOLVED | Noted: 2021-02-28 | Resolved: 2025-01-21

## 2025-01-21 PROBLEM — O26.03: Status: RESOLVED | Noted: 2023-04-13 | Resolved: 2025-01-21

## 2025-01-21 PROBLEM — O21.9 VOMITING DURING PREGNANCY: Status: RESOLVED | Noted: 2023-01-24 | Resolved: 2025-01-21

## 2025-01-21 PROBLEM — N17.9 AKI (ACUTE KIDNEY INJURY) (HCC): Status: RESOLVED | Noted: 2023-07-01 | Resolved: 2025-01-21

## 2025-01-21 PROBLEM — O09.891 MEDICATION EXPOSURE DURING FIRST TRIMESTER OF PREGNANCY: Status: RESOLVED | Noted: 2023-01-24 | Resolved: 2025-01-21

## 2025-01-21 PROBLEM — Z34.02 ENCOUNTER FOR SUPERVISION OF NORMAL FIRST PREGNANCY IN SECOND TRIMESTER: Status: RESOLVED | Noted: 2023-01-24 | Resolved: 2025-01-21

## 2025-01-21 PROBLEM — M54.9 PREGNANCY RELATED BACK PAIN, ANTEPARTUM, THIRD TRIMESTER: Status: RESOLVED | Noted: 2023-04-13 | Resolved: 2025-01-21

## 2025-01-21 PROBLEM — O42.90 PROM (PREMATURE RUPTURE OF MEMBRANES): Status: RESOLVED | Noted: 2023-06-27 | Resolved: 2025-01-21

## 2025-01-21 PROBLEM — O99.810 ABNORMAL GLUCOSE TOLERANCE TEST (GTT) DURING PREGNANCY, ANTEPARTUM: Status: RESOLVED | Noted: 2023-04-13 | Resolved: 2025-01-21

## 2025-01-21 PROBLEM — R34 URINE OUTPUT LOW: Status: RESOLVED | Noted: 2023-06-30 | Resolved: 2025-01-21

## 2025-01-21 PROBLEM — O99.210 MATERNAL OBESITY, ANTEPARTUM: Status: RESOLVED | Noted: 2023-02-16 | Resolved: 2025-01-21

## 2025-01-21 RX ORDER — ALPRAZOLAM 0.25 MG/1
0.25 TABLET ORAL 2 TIMES DAILY PRN
Qty: 60 TABLET | Refills: 0 | OUTPATIENT
Start: 2025-01-21

## 2025-01-21 RX ORDER — ALPRAZOLAM 0.25 MG/1
0.25 TABLET ORAL 2 TIMES DAILY PRN
Qty: 30 TABLET | Refills: 0 | Status: SHIPPED | OUTPATIENT
Start: 2025-01-21 | End: 2025-01-24 | Stop reason: SDUPTHER

## 2025-01-24 ENCOUNTER — OFFICE VISIT (OUTPATIENT)
Dept: FAMILY MEDICINE CLINIC | Facility: CLINIC | Age: 30
End: 2025-01-24
Payer: COMMERCIAL

## 2025-01-24 VITALS
HEART RATE: 100 BPM | WEIGHT: 208 LBS | SYSTOLIC BLOOD PRESSURE: 100 MMHG | BODY MASS INDEX: 34.66 KG/M2 | HEIGHT: 65 IN | OXYGEN SATURATION: 98 % | DIASTOLIC BLOOD PRESSURE: 70 MMHG

## 2025-01-24 DIAGNOSIS — G47.00 INSOMNIA, UNSPECIFIED TYPE: ICD-10-CM

## 2025-01-24 DIAGNOSIS — Z30.011 ORAL CONTRACEPTIVE PRESCRIBED: ICD-10-CM

## 2025-01-24 DIAGNOSIS — F41.9 ANXIETY: Primary | ICD-10-CM

## 2025-01-24 DIAGNOSIS — E66.9 OBESITY (BMI 30-39.9): ICD-10-CM

## 2025-01-24 PROCEDURE — 99213 OFFICE O/P EST LOW 20 MIN: CPT | Performed by: FAMILY MEDICINE

## 2025-01-24 RX ORDER — FLUTICASONE PROPIONATE 50 MCG
SPRAY, SUSPENSION (ML) NASAL
COMMUNITY
Start: 2025-01-22

## 2025-01-24 RX ORDER — ALPRAZOLAM 0.25 MG/1
0.25 TABLET ORAL 2 TIMES DAILY PRN
Qty: 60 TABLET | Refills: 2 | Status: SHIPPED | OUTPATIENT
Start: 2025-01-24

## 2025-01-24 RX ORDER — NORETHINDRONE 0.35 MG/1
1 TABLET ORAL DAILY
Qty: 28 TABLET | Refills: 2 | OUTPATIENT
Start: 2025-01-24

## 2025-01-24 NOTE — PROGRESS NOTES
Assessment/Plan:    Anxiety  Back at  work , son is  very active , using  xanax  bid, also on Remeron  Sometimes uses  just  one/day, is  using  medical  marijuana so will likely  have  thc in urine     Diagnoses and all orders for this visit:    Anxiety  -     [facility] All Prescribed Meds and Special Instructions  -     Amphetamines, Methamphetamines  -     Butalbital  -     Phenobarbital  -     Secobarbital  -     Alprazolam  -     Clonazepam  -     Diazepam, Temazepam, Oxazepam  -     Lorazepam  -     Gabapentin  -     Pregabalin  -     Cocaine  -     Heroin  -     Buprenorphine  -     Levorphanol  -     Meperidine  -     Naltrexone  -     Fentanyl  -     Methadone  -     Oxycodone  -     Tapentadol  -     THC  -     Tramadol  -     Codeine, Hydrocodone, Hydropmorphone, Morphine  -     Bath Salts  -     Ethyl Glucuronide/Ethyl Sulfate  -     Kratom  -     Spice  -     Methylphenidate  -     Phentermine  -     Validity Oxidant  -     Validity Creatinine  -     Validity pH  -     Validity Specific  -     Xylazine Definitive Test  -     ALPRAZolam (XANAX) 0.25 mg tablet; Take 1 tablet (0.25 mg total) by mouth 2 (two) times a day as needed for anxiety    Insomnia, unspecified type  -     [facility] All Prescribed Meds and Special Instructions  -     Amphetamines, Methamphetamines  -     Butalbital  -     Phenobarbital  -     Secobarbital  -     Alprazolam  -     Clonazepam  -     Diazepam, Temazepam, Oxazepam  -     Lorazepam  -     Gabapentin  -     Pregabalin  -     Cocaine  -     Heroin  -     Buprenorphine  -     Levorphanol  -     Meperidine  -     Naltrexone  -     Fentanyl  -     Methadone  -     Oxycodone  -     Tapentadol  -     THC  -     Tramadol  -     Codeine, Hydrocodone, Hydropmorphone, Morphine  -     Bath Salts  -     Ethyl Glucuronide/Ethyl Sulfate  -     Kratom  -     Spice  -     Methylphenidate  -     Phentermine  -     Validity Oxidant  -     Validity Creatinine  -     Validity pH  -      "Validity Specific  -     Xylazine Definitive Test    Other orders  -     fluticasone (FLONASE) 50 mcg/act nasal spray          Subjective:      Patient ID: Jennifer Marie Fahringer is a 29 y.o. female.    Patient presents with:  Medication Management: anxiety medication follow up   , take  xanax 1-2  times/day, remeron and  medical marijuana, her  toddler  is strong  willed and she is  back at work        The following portions of the patient's history were reviewed and updated as appropriate: allergies, current medications, past family history, past medical history, past social history, past surgical history, and problem list.      Review of Systems   Constitutional:  Negative for activity change, appetite change and fatigue.   Respiratory:  Negative for shortness of breath.    Cardiovascular:  Negative for chest pain.   Neurological:  Negative for dizziness, light-headedness and headaches.   Psychiatric/Behavioral:  Positive for sleep disturbance. Negative for dysphoric mood. The patient is nervous/anxious.          Objective:      /70 (BP Location: Left arm, Patient Position: Sitting, Cuff Size: Standard)   Pulse 100   Ht 5' 5\" (1.651 m)   Wt 94.3 kg (208 lb)   SpO2 98%   BMI 34.61 kg/m²          Physical Exam  Vitals reviewed.   Constitutional:       Appearance: Normal appearance. She is obese.   Cardiovascular:      Rate and Rhythm: Normal rate and regular rhythm.      Pulses: Normal pulses.      Heart sounds: Normal heart sounds.   Pulmonary:      Effort: Pulmonary effort is normal.      Breath sounds: Normal breath sounds.   Musculoskeletal:      Right lower leg: No edema.      Left lower leg: No edema.   Lymphadenopathy:      Cervical: No cervical adenopathy.   Neurological:      Mental Status: She is alert.   Psychiatric:         Mood and Affect: Mood is anxious.            "

## 2025-01-27 LAB
4OH-XYLAZINE UR QL CFM: NEGATIVE NG/ML
6MAM UR QL CFM: NEGATIVE NG/ML
7AMINOCLONAZEPAM UR QL CFM: NEGATIVE NG/ML
A-OH ALPRAZ UR QL CFM: NORMAL NG/ML
ACCEPTABLE CREAT UR QL: NORMAL MG/DL
ACCEPTIBLE SP GR UR QL: NORMAL
AMPHET UR QL CFM: NEGATIVE NG/ML
BUPRENORPHINE UR QL CFM: NEGATIVE NG/ML
BUTALBITAL UR QL CFM: NEGATIVE NG/ML
BZE UR QL CFM: NEGATIVE NG/ML
CODEINE UR QL CFM: NEGATIVE NG/ML
EDDP UR QL CFM: NEGATIVE NG/ML
ETHYL GLUCURONIDE UR QL CFM: NEGATIVE NG/ML
ETHYL SULFATE UR QL SCN: NEGATIVE NG/ML
EUTYLONE UR QL: NEGATIVE NG/ML
FENTANYL UR QL CFM: NEGATIVE NG/ML
GLIADIN IGG SER IA-ACNC: NEGATIVE NG/ML
HYDROCODONE UR QL CFM: NEGATIVE NG/ML
HYDROMORPHONE UR QL CFM: NEGATIVE NG/ML
LORAZEPAM UR QL CFM: NEGATIVE NG/ML
ME-PHENIDATE UR QL CFM: NEGATIVE NG/ML
MEPERIDINE UR QL CFM: NEGATIVE NG/ML
METHADONE UR QL CFM: NEGATIVE NG/ML
METHAMPHET UR QL CFM: NEGATIVE NG/ML
MORPHINE UR QL CFM: NEGATIVE NG/ML
NALTREXONE UR QL CFM: NEGATIVE NG/ML
NITRITE UR QL: NORMAL UG/ML
NORBUPRENORPHINE UR QL CFM: NEGATIVE NG/ML
NORDIAZEPAM UR QL CFM: NEGATIVE NG/ML
NORFENTANYL UR QL CFM: NEGATIVE NG/ML
NORHYDROCODONE UR QL CFM: NEGATIVE NG/ML
NORMEPERIDINE UR QL CFM: NEGATIVE NG/ML
NOROXYCODONE UR QL CFM: NEGATIVE NG/ML
OXAZEPAM UR QL CFM: NEGATIVE NG/ML
OXYCODONE UR QL CFM: NEGATIVE NG/ML
OXYMORPHONE UR QL CFM: NEGATIVE NG/ML
PARA-FLUOROFENTANYL QUANTIFICATION: NORMAL NG/ML
PHENOBARB UR QL CFM: NEGATIVE NG/ML
RESULT ALL_PRESCRIBED MEDS AND SPECIAL INSTRUCTIONS: NORMAL
SECOBARBITAL UR QL CFM: NEGATIVE NG/ML
SL AMB 5F-ADB-M7 METABOLITE QUANTIFICATION: NEGATIVE NG/ML
SL AMB 7-OH-MITRAGYNINE (KRATOM ALKALOID) QUANTIFICATION: NEGATIVE NG/ML
SL AMB AB-FUBINACA-M3 METABOLITE QUANTIFICATION: NEGATIVE NG/ML
SL AMB ACETYL FENTANYL QUANTIFICATION: NORMAL NG/ML
SL AMB ACETYL NORFENTANYL QUANTIFICATION: NORMAL NG/ML
SL AMB ACRYL FENTANYL QUANTIFICATION: NORMAL NG/ML
SL AMB CARFENTANIL QUANTIFICATION: NORMAL NG/ML
SL AMB CTHC (MARIJUANA METABOLITE) QUANTIFICATION: ABNORMAL NG/ML
SL AMB DEXTRORPHAN (DEXTROMETHORPHAN METABOLITE) QUANT: NEGATIVE NG/ML
SL AMB GABAPENTIN QUANTIFICATION: NEGATIVE NG/ML
SL AMB JWH018 METABOLITE QUANTIFICATION: NEGATIVE NG/ML
SL AMB JWH073 METABOLITE QUANTIFICATION: NEGATIVE NG/ML
SL AMB MDMB-FUBINACA-M1 METABOLITE QUANTIFICATION: NEGATIVE NG/ML
SL AMB METHYLONE QUANTIFICATION: NEGATIVE NG/ML
SL AMB N-DESMETHYL-TRAMADOL QUANTIFICATION: NEGATIVE NG/ML
SL AMB PHENTERMINE QUANTIFICATION: NEGATIVE NG/ML
SL AMB PREGABALIN QUANTIFICATION: NEGATIVE NG/ML
SL AMB RCS4 METABOLITE QUANTIFICATION: NEGATIVE NG/ML
SL AMB RITALINIC ACID QUANTIFICATION: NEGATIVE NG/ML
SMOOTH MUSCLE AB TITR SER IF: NEGATIVE NG/ML
SPECIMEN DRAWN SERPL: NEGATIVE NG/ML
SPECIMEN PH ACCEPTABLE UR: NORMAL
TAPENTADOL UR QL CFM: NEGATIVE NG/ML
TEMAZEPAM UR QL CFM: NEGATIVE NG/ML
TRAMADOL UR QL CFM: NEGATIVE NG/ML
URATE/CREAT 24H UR: NEGATIVE NG/ML
XYLAZINE UR QL CFM: NEGATIVE NG/ML

## 2025-01-28 ENCOUNTER — RESULTS FOLLOW-UP (OUTPATIENT)
Dept: FAMILY MEDICINE CLINIC | Facility: CLINIC | Age: 30
End: 2025-01-28

## 2025-02-04 DIAGNOSIS — Z30.011 ORAL CONTRACEPTIVE PRESCRIBED: ICD-10-CM

## 2025-02-05 RX ORDER — ACETAMINOPHEN AND CODEINE PHOSPHATE 120; 12 MG/5ML; MG/5ML
1 SOLUTION ORAL DAILY
Qty: 84 TABLET | Refills: 0 | OUTPATIENT
Start: 2025-02-05

## 2025-02-06 DIAGNOSIS — Z30.011 ORAL CONTRACEPTIVE PRESCRIBED: ICD-10-CM

## 2025-02-06 NOTE — TELEPHONE ENCOUNTER
Patient states she is now scheduled for and appointment. Patient requesting med sent in urgently as she has been without medication for 5 days.     Reason for call:   [x] Refill   [] Prior Auth  [] Other:     Office:   [] PCP/Provider -   [x] Specialty/Provider - OBGYN    Medication: norethindrone (Jencycla) 0.35 MG     Dose/Frequency:  TAKE 1 TABLET BY MOUTH EVERY DAY     Quantity: 84    Pharmacy: [facility]/pharmacy #[id number] - [place] - [address] [place] [address]     Does the patient have enough for 3 days?   [] Yes   [x] No - Send as HP to POD

## 2025-02-07 RX ORDER — NORETHINDRONE 0.35 MG/1
1 TABLET ORAL DAILY
Qty: 28 TABLET | Refills: 2 | OUTPATIENT
Start: 2025-02-07

## 2025-02-07 RX ORDER — ACETAMINOPHEN AND CODEINE PHOSPHATE 120; 12 MG/5ML; MG/5ML
1 SOLUTION ORAL DAILY
Qty: 84 TABLET | Refills: 1 | OUTPATIENT
Start: 2025-02-07

## 2025-02-07 NOTE — TELEPHONE ENCOUNTER
"Patient called Rx Refill line. Informed her that her medication will be renewed at her yearly. Patient stated \"this is her birth control and you can't just take that from her.\"     Patient was under the assumption a courtesy would be sent until her appointment.     Patient is requesting a call back at [phone].  "

## 2025-02-10 NOTE — TELEPHONE ENCOUNTER
Patient has not been seen since 8/2023.  In order to refill medications patients must be seen yearly.  She has received numerous refills beyond the 3 month an period.  I will renew further refills when I see her at her yearly.

## 2025-02-18 ENCOUNTER — ANNUAL EXAM (OUTPATIENT)
Dept: OBGYN CLINIC | Facility: CLINIC | Age: 30
End: 2025-02-18
Payer: COMMERCIAL

## 2025-02-18 VITALS
DIASTOLIC BLOOD PRESSURE: 74 MMHG | SYSTOLIC BLOOD PRESSURE: 108 MMHG | BODY MASS INDEX: 34.99 KG/M2 | HEIGHT: 65 IN | WEIGHT: 210 LBS

## 2025-02-18 DIAGNOSIS — N92.0 MENORRHAGIA WITH REGULAR CYCLE: ICD-10-CM

## 2025-02-18 DIAGNOSIS — N94.6 DYSMENORRHEA: ICD-10-CM

## 2025-02-18 DIAGNOSIS — Z01.419 ENCOUNTER FOR GYNECOLOGICAL EXAMINATION WITHOUT ABNORMAL FINDING: Primary | ICD-10-CM

## 2025-02-18 DIAGNOSIS — Z30.41 ORAL CONTRACEPTIVE PILL SURVEILLANCE: ICD-10-CM

## 2025-02-18 PROCEDURE — 99395 PREV VISIT EST AGE 18-39: CPT | Performed by: NURSE PRACTITIONER

## 2025-02-18 PROCEDURE — 99459 PELVIC EXAMINATION: CPT | Performed by: NURSE PRACTITIONER

## 2025-02-18 RX ORDER — ACETAMINOPHEN AND CODEINE PHOSPHATE 120; 12 MG/5ML; MG/5ML
1 SOLUTION ORAL DAILY
Qty: 84 TABLET | Refills: 4 | Status: SHIPPED | OUTPATIENT
Start: 2025-02-18

## 2025-02-18 RX ORDER — IBUPROFEN 600 MG/1
600 TABLET, FILM COATED ORAL EVERY 6 HOURS PRN
Qty: 60 TABLET | Refills: 2 | Status: SHIPPED | OUTPATIENT
Start: 2025-02-18

## 2025-02-18 NOTE — PROGRESS NOTES
Assessment / Plan    Assessment & Plan  Encounter for gynecological examination without abnormal finding  Well woman exam.  3/2022 pap normal.  Repeat next year with HPV.       Oral contraceptive pill surveillance  Reviewed alternative progesterone only options given her migraines w/ visual auras: depo provera, implant or LN IUD.  She will continue with  progesterone only pill with prescription strength NSAIDs and if ineffective will consider IUD.    Orders:    norethindrone (Jencycla) 0.35 MG tablet; Take 1 tablet (0.35 mg total) by mouth daily    Dysmenorrhea    Orders:    ibuprofen (MOTRIN) 600 mg tablet; Take 1 tablet (600 mg total) by mouth every 6 (six) hours as needed for mild pain    Menorrhagia with regular cycle    Orders:    norethindrone (Jencycla) 0.35 MG tablet; Take 1 tablet (0.35 mg total) by mouth daily    ibuprofen (MOTRIN) 600 mg tablet; Take 1 tablet (600 mg total) by mouth every 6 (six) hours as needed for mild pain        Subjective      Jennifer Marie Fahringer is a 29 y.o. female who presents for her annual gynecologic exam.    28 yo     Last seen 2023 for postpartum visit.  Taking  progesterone only pill.  States her periods are heavy and painful, although regular.  Bleeds for 7 days, 3-4 are heavy, changes a tampon and pad every 2 hours.  She has a hx of migraines.  Admits to visual disturbances prior to onset.    Last pap: 3/2022 pap negative  Pap hx: normal  STD screenin2022 G/C negative  STD hx: none  Sexually active: yes, partner of 5 yrs  Condom use: --  Nutrition: Body mass index is 34.95 kg/m².  Calcium: given guidelines  Exercise: daily walking  Safety: feels safe    Periods are regular   Current contraception: oral progesterone-only contraceptive  History of abnormal Pap smear: no  Family history of breast,uterine, ovarian or colon cancer: no      The following portions of the patient's history were reviewed and updated as appropriate: allergies, current medications,  "past family history, past medical history, past social history, past surgical history, and problem list.    Review of Systems      Review of Systems   Constitutional:  Negative for chills and fever.   Respiratory:  Negative for cough and shortness of breath.    Gastrointestinal:  Negative for abdominal distention, abdominal pain, blood in stool, constipation, diarrhea, nausea and vomiting.   Genitourinary:  Positive for menstrual problem (heavy and painful). Negative for difficulty urinating, dysuria, frequency, genital sores, hematuria, pelvic pain, urgency, vaginal bleeding and vaginal discharge.   Musculoskeletal:  Negative for arthralgias and myalgias.     Breasts:  Negative for skin changes, dimpling, asymmetry, nipple discharge, redness, tenderness or palpable masses    Objective     *patient agrees to exam without a chaperone present.     /74 (BP Location: Left arm, Patient Position: Sitting, Cuff Size: Large)   Ht 5' 5\" (1.651 m)   Wt 95.3 kg (210 lb)   LMP 01/27/2025 (Approximate)   BMI 34.95 kg/m²   Physical Exam  Constitutional:       General: She is not in acute distress.     Appearance: Normal appearance. She is well-developed. She is not ill-appearing or diaphoretic.      Comments: Body mass index is 34.95 kg/m².     HENT:      Head: Normocephalic and atraumatic.   Eyes:      Pupils: Pupils are equal, round, and reactive to light.   Neck:      Thyroid: No thyromegaly.   Pulmonary:      Effort: Pulmonary effort is normal.   Chest:   Breasts:     Breasts are symmetrical.      Right: No inverted nipple, mass, nipple discharge, skin change or tenderness.      Left: No inverted nipple, mass, nipple discharge, skin change or tenderness.   Abdominal:      General: There is no distension.      Palpations: Abdomen is soft. There is no mass.      Tenderness: There is no abdominal tenderness. There is no guarding or rebound.   Genitourinary:     General: Normal vulva.      Exam position: Lithotomy " position.      Labia:         Right: No rash, tenderness, lesion or injury.         Left: No rash, tenderness, lesion or injury.       Vagina: No signs of injury and foreign body. No vaginal discharge, erythema, tenderness or bleeding.      Cervix: No cervical motion tenderness, discharge or friability.      Uterus: Not enlarged and not tender.       Adnexa:         Right: No mass or tenderness.          Left: No mass or tenderness.        Rectum: Normal.   Musculoskeletal:      Cervical back: Neck supple.   Lymphadenopathy:      Cervical: No cervical adenopathy.      Upper Body:      Right upper body: No supraclavicular adenopathy.      Left upper body: No supraclavicular adenopathy.   Skin:     General: Skin is warm and dry.   Neurological:      General: No focal deficit present.      Mental Status: She is alert and oriented to person, place, and time.   Psychiatric:         Mood and Affect: Mood normal.         Behavior: Behavior normal.         Thought Content: Thought content normal.         Judgment: Judgment normal.

## 2025-02-18 NOTE — PATIENT INSTRUCTIONS
"Patient Education     Diet and health   The Basics   Written by the doctors and editors at [facility]   Why is it important to eat a healthy diet? -- It's important to eat a healthy diet because eating the right foods can keep you healthy now and later in life. It can lower the risk of problems like heart disease, diabetes, high blood pressure, and some types of cancer. It can also help you live longer and improve your quality of life.  What kind of diet is best? -- There is no 1 specific diet that experts recommend for everyone. People choose what foods to eat for many different reasons. These include personal preference, culture, Mormonism, allergies or intolerances, and nutritional goals. People also need to consider the cost and availability of different foods.  In general, experts recommend a diet that:   Includes lots of vegetables, fruits, beans, nuts, and whole grains   Limits red and processed meats, unhealthy fats, sugar, salt, and alcohol  What are dietary patterns? -- A dietary \"pattern\" means generally eating certain types of foods while limiting others. Some people need to follow a specific dietary pattern because of their health needs. For example, if you have high blood pressure, your doctor might recommend a diet low in salt.  If you are trying to improve your health in general, choosing a healthy dietary pattern can help. This does not have to mean being very strict about what you eat or avoid. The goal is to think about getting plenty of healthy foods while limiting less healthy foods.  Examples of dietary patterns include:   Mediterranean diet - This involves eating a lot of fruits, vegetables, nuts, and whole grains, and uses olive oil instead of other fats. It also includes some fish, poultry, and dairy products, but not a lot of red meat. Following this diet can help your overall health, and might even lower your risk of having a stroke.   Plant-based diets - These patterns focus on vegetables, " "fruits, grains, beans, and nuts. They limit or avoid food that comes from animals, such as meat and dairy. There are different types of plant-based diets, including vegetarian and vegan.   Low-fat diet - A low-fat diet involves limiting calories from fat. This might help some people keep weight off if that is their goal, but it does not have many other health benefits. If you choose to follow a low-fat diet, it is also important to focus on getting lots of whole grains, legumes, fruits, and vegetables. Limit refined grains and sugar.   Low-cholesterol diet - Cholesterol is found in foods with a lot of saturated fat, like red meat, butter, and cheese. A low-cholesterol diet focuses on limiting the amount of cholesterol that you eat. Limiting the cholesterol in your diet can also help lower the amount of unhealthy fats that you eat.  Which foods are especially healthy? -- Foods that are especially healthy include:   Fruits and vegetables - Eating a diet with lots of fruits and vegetables can help prevent heart disease and stroke. It might also help prevent certain types of cancer. Try to eat fruits and vegetables at each meal and also for snacks. If you don't have fresh fruits and vegetables available, you can eat frozen or canned ones instead. Doctors recommend eating at least 5 servings of fruits or vegetables each day.   Whole grains - Whole-grain foods include 100 percent whole-wheat bread, steel cut oats, and whole-grain pasta. These are healthier than foods made with \"refined\" grains, like white bread and white rice. Eating lots of whole grains instead of refined grains has been shown to help with weight control. It can also lower the risk of several health problems, including colon cancer, heart disease, and diabetes. Doctors recommend that most people try to eat 5 to 8 servings of whole-grain, high-fiber foods each day.   Foods with fiber - Eating foods with a lot of fiber can help prevent heart disease and " "stroke. If you have type 2 diabetes, it can also help control your blood sugar. Foods that have a lot of fiber include vegetables, fruits, beans, nuts, oatmeal, and whole-grain breads and cereals. You can tell how much fiber is in a food by reading the nutrition label (figure 1). Doctors recommend that most people eat about 25 to 34 grams of fiber each day.   Foods with calcium and vitamin D - Babies, children, and adults need calcium and vitamin D to help keep their bones strong. Adults also need calcium and vitamin D to help prevent osteoporosis. Osteoporosis is a condition that causes bones to get \"thin\" and break more easily than usual. Different foods and drinks have calcium and vitamin D in them (figure 2). People who don't get enough calcium and vitamin D in their diet might need to take a supplement. Doctors recommend that most people have 2 to 3 servings of foods with calcium and vitamin D each day.   Foods with protein - Protein helps your muscles and bones stay strong. Healthy foods with a lot of protein include chicken, fish, eggs, beans, nuts, and soy products. Red meat also has a lot of protein, but it also contains fats, which can be unhealthy. Doctors recommend that most people try to eat about 5 servings of protein each day.   Healthy fats - There are different types of fats. Some types of fats are better for your body than others. Healthy fats are \"monounsaturated\" or \"polyunsaturated\" fats. These are found in fatty fish, nuts and nut butters, and avocados. Use plant-based oils when cooking. Examples of these oils include olive, canola, safflower, sunflower, and corn oil. Eating foods with healthy fats, while avoiding or limiting foods with unhealthy fats, might lower the risk of heart disease.   Foods with folate - Folate is a vitamin that is important for pregnant people, since it helps prevent certain birth defects. It is also called \"folic acid.\" Anyone who could get pregnant should get at " "least 400 micrograms of folic acid daily, whether or not they are actively trying to get pregnant. Folate is found in many breakfast cereals, oranges, orange juice, and green leafy vegetables.  What foods should I avoid or limit? -- To eat a healthy diet, there are some things that you should avoid or limit. They include:   Unhealthy fats - \"Trans\" fats are especially unhealthy. They are found in margarines, many fast foods, and some store-bought baked goods. \"Saturated\" fats are found in animal products like meats, egg yolks, butter, cheese, and full-fat milk products. Unhealthy fats can raise your cholesterol level and increase your chance of getting heart disease.   Sugar - To have a healthy diet, it's important to limit or avoid added sugar, sweets, and refined grains. Refined grains are found in white bread, white rice, most pastas, and most packaged \"snack\" foods.  Avoiding sugar-sweetened beverages, like soda and sports drinks, can also help improve your health.  Avoid canned fruits in \"heavy\" syrup.   Red and processed meats - Studies have shown that eating a lot of red meat can increase your risk of certain health problems, including heart disease and cancer. You should limit the amount of red meat that you eat. This is also true for processed meats like sausage, hot dogs, and mosquera.  Can I drink alcohol as part of a healthy diet? -- Not drinking alcohol at all is the healthiest choice. Regular drinking can raise a person's chances of getting liver disease and certain types of cancers. In females, even 1 drink a day can increase the risk of getting breast cancer.  If you do choose to drink, most doctors recommend limiting alcohol to no more than:   1 drink a day for females   2 drinks a day for males  The limits are different because, generally, the female body takes longer to break down alcohol.  How many calories do I need each day? -- Calories give your body energy. The number of calories that you need " "each day depends on your weight, height, age, sex, and how active you are.  Your doctor or nurse can tell you about how many calories you should eat each day. You can also work with a dietitian (nutrition expert) to learn more about your dietary needs and options.  What if I am having trouble improving my diet? -- It can be hard to change the way that you eat. Remember that even small changes can improve your health.  Here are some tips that might help:   Try to make fruits and vegetables part of every meal. If you don't have fresh fruits and vegetables, frozen or canned are good options. Look for products without added salt or sugar.   Keep a bowl of fruit out for snacking.   When you can, choose whole grains instead of refined grains. Choose chicken, fish, and beans instead of red meat and cheese.   Try to eat prepared and processed foods less often.   Try flavored seltzer or water instead of soda or juice.   When eating at fast food restaurants, look for healthier items, like broiled chicken or salad.  If you have questions about which foods you should or should not eat, ask your doctor, nurse, or dietitian. The right diet for you will depend, in part, on your health and any medical conditions you have.  All topics are updated as new evidence becomes available and our peer review process is complete.  This topic retrieved from [employer] on: Feb 28, 2024.  Topic 46298 Version 28.0  Release: 32.2.4 - C32.58  © 2024 UpToDate, Inc. and/or its affiliates. All rights reserved.  figure 1: Nutrition label - Fiber     This is an example of a nutrition label. To figure out how much fiber is in a food, look for the line that says \"Dietary Fiber.\" It's also important to look at the serving size. This food has 7 grams of fiber in each serving, and each serving is 1 cup.  Graphic 31886 Version 8.0  figure 2: Foods and drinks with calcium and vitamin D     Foods rich in calcium include ice cream, soy milk, breads, kale, " "broccoli, milk, cheese, cottage cheese, almonds, yogurt, ready-to-eat cereals, beans, and tofu. Foods rich in vitamin D include milk, fortified plant-based \"milks\" (soy, almond), canned tuna fish, cod liver oil, yogurt, ready-to-eat-cereals, cooked salmon, canned sardines, mackerel, and eggs. Some of these foods are rich in both.  Graphic 38852 Version 4.0  Consumer Information Use and Disclaimer   Disclaimer: This generalized information is a limited summary of diagnosis, treatment, and/or medication information. It is not meant to be comprehensive and should be used as a tool to help the user understand and/or assess potential diagnostic and treatment options. It does NOT include all information about conditions, treatments, medications, side effects, or risks that may apply to a specific patient. It is not intended to be medical advice or a substitute for the medical advice, diagnosis, or treatment of a health care provider based on the health care provider's examination and assessment of a patient's specific and unique circumstances. Patients must speak with a health care provider for complete information about their health, medical questions, and treatment options, including any risks or benefits regarding use of medications. This information does not endorse any treatments or medications as safe, effective, or approved for treating a specific patient. UpToDate, Inc. and its affiliates disclaim any warranty or liability relating to this information or the use thereof.The use of this information is governed by the Terms of Use, available at https://www.wolterskluwer.com/en/know/clinical-effectiveness-terms. 2024© UpToDate, Inc. and its affiliates and/or licensors. All rights reserved.  Copyright   © 2024 UpToDate, Inc. and/or its affiliates. All rights reserved.  Patient Education     Calcium Rich Diet   About this topic   Calcium is one of the most important minerals and is found in almost all parts of your " body. It makes your teeth and bones strong and healthy. Your body does not make calcium. It is important for you to eat calcium rich foods to get enough calcium. It also helps your body:  Make blood clots  Keep your heartbeat normal  Helps blood move throughout the body  Release hormones  Release enzymes  Send and get signals between your nerves and brain  Make muscles work well         What will the results be?   It is important to have a good amount of calcium in your food. Calcium helps your body work well. It is very important during every life stage. Calcium may also keep you from losing bone mass. This is osteopenia. It may help keep you from having weak bones. This is osteoporosis.  What drugs may be needed?   The doctor may order calcium and vitamin D supplements. You need vitamin D to help your body take in the calcium. Your calcium supplement may have vitamin D in it.  Talk to your doctor about:  If it is OK to take your calcium with food.  How often to take your calcium supplement throughout the day.  All the drugs you are taking. Be sure to include all prescription and over-the-counter (OTC) drugs, and herbal supplements. Tell the doctor about any drug allergy. Bring a list of drugs you take with you. Some drugs may cause problems with how your body takes in calcium.  Will physical activity be limited?   No, physical activities will not be limited. It is good for you to do light exercises and to stay active.  What changes to diet are needed?   You will need to watch how much calcium is in the foods you eat. Your doctor will talk to you about the right amount of calcium for you. How much calcium you need is based on your age and life stage.  When is this diet used?   This diet is used when your normal diet is low in calcium. It is needed to raise the amount of calcium in your diet. Our bodies do not take in calcium well as we get older.  Who should not use this diet?   People with too much calcium in  their blood should not use this diet. This is called hypercalcemia.  What foods are good to eat?   Milk, yogurt, and cheese products are naturally high in calcium.  These foods have smaller amounts of calcium. If they are eaten often and in large amounts they can be good sources of calcium:  Oysters; dried fruit like figs and raisins; green leafy vegetables like broccoli, collards, kale, mustard greens, bok amparo; soy beans; oranges  [place] and sardines. Be sure to eat the soft bones.  Nuts like almonds and [place] nuts, sunflower seeds, tahini, dried beans  Food products with calcium added to them like orange juice, tofu, cereal, bread; almond milk, rice milk, soy milk  What foods should be limited or avoided?   People who eat many kinds of foods do not have to be worried about limiting or avoiding certain foods.   What problems could happen?   Some people may get kidney stones. This may happen after taking high amounts of calcium over a long time. Calcium from food does not seem to cause kidney stones.  Hard stools.  Too much calcium may interfere with your body’s ability to absorb iron and zinc.  When do I need to call the doctor?   Call your doctor if you have concerns about your diet. Tell your doctor if you are allergic to any of the foods in your diet.  Helpful tips   If you have problems digesting milk, try lactose-free milk. You may also use a product to help you take in lactose.  Talk with your doctor if you are a vegetarian and do not eat dairy products. Your doctor can help you make sure you get the amount of calcium your body needs.  Last Reviewed Date   2021-03-12  Consumer Information Use and Disclaimer   This generalized information is a limited summary of diagnosis, treatment, and/or medication information. It is not meant to be comprehensive and should be used as a tool to help the user understand and/or assess potential diagnostic and treatment options. It does NOT include all information about  conditions, treatments, medications, side effects, or risks that may apply to a specific patient. It is not intended to be medical advice or a substitute for the medical advice, diagnosis, or treatment of a health care provider based on the health care provider's examination and assessment of a patient’s specific and unique circumstances. Patients must speak with a health care provider for complete information about their health, medical questions, and treatment options, including any risks or benefits regarding use of medications. This information does not endorse any treatments or medications as safe, effective, or approved for treating a specific patient. UpToDate, Inc. and its affiliates disclaim any warranty or liability relating to this information or the use thereof. The use of this information is governed by the Terms of Use, available at https://www.Beststudy.com/en/know/clinical-effectiveness-terms   Copyright   Copyright © 2024 UpToDate, Inc. and its affiliates and/or licensors. All rights reserved.  Patient Education     Exercise and movement   The Basics   Written by the doctors and editors at [employer]   What are the benefits of movement? -- Moving your body has many benefits. It can:   Burn calories, which helps people manage their weight   Help control blood sugar levels in people with diabetes   Lower blood pressure, especially in people with high blood pressure   Lower stress, and help with depression and anxiety   Keep bones strong, so they don't get thin and break easily   Lower the chance of dying from heart disease  Adding even small amounts of physical activity to your daily routine can improve your health.  What are the main types of exercise? -- There are 3 main types of exercise:   Aerobic exercise - This raises your heart rate. Examples include walking, running, dancing, riding a bike, and swimming.   Muscle strengthening - This helps make your muscles stronger. You can do it using  weights, exercise bands, or weight machines. You can also use your own body weight, as with push-ups, or by lifting items in your home, like jugs of water.   Stretching - These help your muscles and joints move more easily.  It's important to have all 3 types in your exercise program. That way, your body, muscles, and joints can be as healthy as possible.  Should I talk to my doctor or nurse before I start exercising? -- If you have not exercised before or have not exercised in a long time, talk with your doctor or nurse before you start a very active exercise program.  If you have heart disease or risk factors for heart disease (like high blood pressure or diabetes), your doctor or nurse might recommend that you have an exercise test before starting an exercise program.  When you begin an exercise program, start slowly. For example, do the exercise at a slow pace or for a few minutes only. Over time, you can exercise faster and for longer periods of time.  What should I do when I exercise? -- Each time you exercise, you should:   Warm up - This can help keep you from hurting your muscles when you exercise. To warm up, do a light aerobic exercise (such as walking slowly) or stretch for 5 to 10 minutes.   Work out - Try to get a mix of aerobic exercise, muscle strengthening, and stretching. During an aerobic workout, you can walk fast, swim, run, or use an exercise machine, for example. Other activities, like dancing or playing tennis, are also forms of aerobic exercise. You should also take time to stretch all of your joints, including your neck, shoulders, back, hips, and knees. At least 2 times a week, you can do muscle strengthening exercises as part of your workout.   Cool down - This helps keep you from feeling dizzy after you exercise and helps prevent muscle cramps. To cool down, you can stretch or do a light aerobic exercise for 5 minutes.  Some people go to a gym or do group exercise classes. But you can  exercise even without these things. Some exercises can be done even in a small space. You can also try online videos or smartphone apps to get ideas for different types of exercise.  How often should I exercise? -- Doctors recommend that people exercise at least 30 minutes a day, on 5 or more days of the week.  If you can't exercise for 30 minutes straight, try to exercise for 10 minutes at a time, 3 or 4 times a day. Even exercising for shorter amounts of time is good for you, especially if it means spending less time sitting.  When should I call my doctor or nurse? -- If you have any of the following symptoms when you exercise, stop exercising and call your doctor or nurse right away:   Pain or pressure in your chest, arms, throat, jaw, or back   Nausea or vomiting   Feeling like your heart is fluttering or racing very fast   Feeling dizzy or faint  What if I don't have time to exercise? -- Many people have very busy lives and might not think that they have time to exercise. But it's important to try to find time, even if you are tired or work a lot. Exercise can increase your energy level, which can make you feel better and might even help you get more work done.  Even if it's hard to set aside a lot of time to exercise, you can still improve your health by moving your body more. There are many ways that you can be more active. For example, you can:   Take the stairs instead of the elevator.   Park in a parking space that is farther away from the door.   Take a longer route when you walk from one place to another.  Spending a lot of time sitting still (for example, watching TV or working on the computer) is bad for your health. Try to get up and move around whenever you can. Even small amounts of movement, like taking short walks, doing household chores, or gardening, can improve your health. Finding activities that you enjoy, or doing them with other people, can help you add more movement into your daily  life.  What else should I do when I exercise? -- To exercise safely and avoid problems, it's important to:   Drink fluids during and after exercising (but avoid drinks with a lot of caffeine or sugar).   Avoid exercising outside if it is too hot or cold.   Wear layers of clothes, so that you can take them off if you get too hot.   Wear shoes that fit well and support your feet.   Be aware of your surroundings if you exercise outside.  All topics are updated as new evidence becomes available and our peer review process is complete.  This topic retrieved from [employer] on: May 18, 2024.  Topic 61528 Version 31.0  Release: 32.4.3 - C32.137  © 2024 UpToDate, Inc. and/or its affiliates. All rights reserved.  Consumer Information Use and Disclaimer   Disclaimer: This generalized information is a limited summary of diagnosis, treatment, and/or medication information. It is not meant to be comprehensive and should be used as a tool to help the user understand and/or assess potential diagnostic and treatment options. It does NOT include all information about conditions, treatments, medications, side effects, or risks that may apply to a specific patient. It is not intended to be medical advice or a substitute for the medical advice, diagnosis, or treatment of a health care provider based on the health care provider's examination and assessment of a patient's specific and unique circumstances. Patients must speak with a health care provider for complete information about their health, medical questions, and treatment options, including any risks or benefits regarding use of medications. This information does not endorse any treatments or medications as safe, effective, or approved for treating a specific patient. UpToDate, Inc. and its affiliates disclaim any warranty or liability relating to this information or the use thereof.The use of this information is governed by the Terms of Use, available at  https://www.woltersShopettiuwer.com/en/know/clinical-effectiveness-terms. 2024© [employer] and its affiliates and/or licensors. All rights reserved.  Copyright   © 2024 [employer] and/or its affiliates. All rights reserved.     Hydroxyzine Pregnancy And Lactation Text: This medication is not safe during pregnancy and should not be taken. It is also excreted in breast milk and breast feeding isn't recommended.

## 2025-03-13 DIAGNOSIS — N92.0 MENORRHAGIA WITH REGULAR CYCLE: ICD-10-CM

## 2025-03-13 DIAGNOSIS — Z30.41 ORAL CONTRACEPTIVE PILL SURVEILLANCE: ICD-10-CM

## 2025-03-13 RX ORDER — NORETHINDRONE 0.35 MG/1
1 TABLET ORAL DAILY
Qty: 84 TABLET | Refills: 5 | OUTPATIENT
Start: 2025-03-13

## 2025-05-06 DIAGNOSIS — G47.00 INSOMNIA, UNSPECIFIED TYPE: ICD-10-CM

## 2025-05-06 DIAGNOSIS — F41.9 ANXIETY: ICD-10-CM

## 2025-05-06 RX ORDER — MIRTAZAPINE 30 MG/1
30 TABLET, FILM COATED ORAL
Qty: 30 TABLET | Refills: 5 | Status: SHIPPED | OUTPATIENT
Start: 2025-05-06

## 2025-05-06 NOTE — TELEPHONE ENCOUNTER
Requested Prescriptions     Pending Prescriptions Disp Refills    mirtazapine (REMERON) 30 mg tablet [Pharmacy Med Name: MIRTAZAPINE 30 MG TABLET] 30 tablet 5     Sig: TAKE 1 TABLET BY MOUTH EVERYDAY AT BEDTIME

## 2025-05-12 DIAGNOSIS — F41.9 ANXIETY: ICD-10-CM

## 2025-05-12 RX ORDER — ALPRAZOLAM 0.25 MG
0.25 TABLET ORAL 2 TIMES DAILY PRN
Qty: 60 TABLET | Refills: 2 | Status: SHIPPED | OUTPATIENT
Start: 2025-05-12

## 2025-05-12 RX ORDER — ALPRAZOLAM 0.25 MG
0.25 TABLET ORAL 2 TIMES DAILY PRN
Qty: 60 TABLET | Refills: 0 | OUTPATIENT
Start: 2025-05-12

## 2025-06-16 DIAGNOSIS — F41.9 ANXIETY: ICD-10-CM

## 2025-06-16 RX ORDER — ALPRAZOLAM 0.25 MG
0.25 TABLET ORAL 2 TIMES DAILY PRN
Qty: 60 TABLET | Refills: 0 | Status: SHIPPED | OUTPATIENT
Start: 2025-06-16

## 2025-07-18 DIAGNOSIS — F41.9 ANXIETY: ICD-10-CM

## 2025-07-18 NOTE — TELEPHONE ENCOUNTER
Requested Prescriptions     Pending Prescriptions Disp Refills    ALPRAZolam (XANAX) 0.25 mg tablet [Pharmacy Med Name: ALPRAZOLAM 0.25 MG TABLET] 60 tablet 0     Sig: TAKE 1 TABLET BY MOUTH 2 TIMES A DAY AS NEEDED FOR ANXIETY.

## 2025-07-19 RX ORDER — ALPRAZOLAM 0.25 MG
0.25 TABLET ORAL 2 TIMES DAILY PRN
Qty: 60 TABLET | Refills: 0 | Status: SHIPPED | OUTPATIENT
Start: 2025-07-19

## 2025-07-21 RX ORDER — ALPRAZOLAM 0.25 MG
0.25 TABLET ORAL 2 TIMES DAILY PRN
Qty: 60 TABLET | Refills: 0 | OUTPATIENT
Start: 2025-07-21

## 2025-08-14 ENCOUNTER — NURSE TRIAGE (OUTPATIENT)
Age: 30
End: 2025-08-14